# Patient Record
Sex: FEMALE | Race: WHITE | Employment: OTHER | URBAN - METROPOLITAN AREA
[De-identification: names, ages, dates, MRNs, and addresses within clinical notes are randomized per-mention and may not be internally consistent; named-entity substitution may affect disease eponyms.]

---

## 2018-03-29 ENCOUNTER — OFFICE VISIT (OUTPATIENT)
Dept: FAMILY MEDICINE CLINIC | Age: 68
End: 2018-03-29
Payer: COMMERCIAL

## 2018-03-29 VITALS
TEMPERATURE: 97.3 F | HEIGHT: 62 IN | DIASTOLIC BLOOD PRESSURE: 74 MMHG | RESPIRATION RATE: 16 BRPM | WEIGHT: 237 LBS | BODY MASS INDEX: 43.61 KG/M2 | HEART RATE: 72 BPM | OXYGEN SATURATION: 98 % | SYSTOLIC BLOOD PRESSURE: 138 MMHG

## 2018-03-29 DIAGNOSIS — M19.91 PRIMARY OSTEOARTHRITIS, UNSPECIFIED SITE: Primary | ICD-10-CM

## 2018-03-29 DIAGNOSIS — I10 ESSENTIAL HYPERTENSION: ICD-10-CM

## 2018-03-29 DIAGNOSIS — E11.9 TYPE 2 DIABETES MELLITUS WITHOUT COMPLICATION, WITHOUT LONG-TERM CURRENT USE OF INSULIN (HCC): ICD-10-CM

## 2018-03-29 PROCEDURE — 99213 OFFICE O/P EST LOW 20 MIN: CPT | Performed by: FAMILY MEDICINE

## 2018-03-29 RX ORDER — HYDROCODONE BITARTRATE AND ACETAMINOPHEN 10; 325 MG/1; MG/1
1 TABLET ORAL EVERY 6 HOURS PRN
Qty: 120 TABLET | Refills: 0 | Status: SHIPPED | OUTPATIENT
Start: 2018-03-29 | End: 2018-04-27 | Stop reason: SDUPTHER

## 2018-03-29 NOTE — PROGRESS NOTES
in the urine, no urinary frequency, no urinary incontinence, no urinary urgency, no nocturia, no dysuria    Vitals:    03/29/18 0740   BP: 138/74   Pulse: 72   Resp: 16   Temp: 97.3 °F (36.3 °C)   TempSrc: Oral   SpO2: 98%   Weight: 237 lb (107.5 kg)   Height: 5' 2\" (1.575 m)       General:  Patient alert and oriented x 3, NAD, pleasant  HEENT:  Atraumatic, normocephalic, PERRLA, EOMI, clear conjunctiva, TMs clear, nose-clear, throat - no erythema  Neck:  Supple, no goiter, no carotid bruits, no LAD  Lungs:  CTA B  Heart:  RRR, no murmurs, gallops or rubs  Abdomen:  Soft/nt/nd, + bowel sounds  Extremities:  No clubbing, cyanosis or edema  Skin: unremarkable    Assessment/Plan:      Aida Bo was seen today for arthritis. Diagnoses and all orders for this visit:    Essential hypertension  Comments:  well controlled. Primary osteoarthritis, unspecified site  -     HYDROcodone-acetaminophen (NORCO)  MG per tablet; Take 1 tablet by mouth every 6 hours as needed for Pain for up to 30 days. Earliest Fill Date: 3/29/18    Type 2 diabetes mellitus without complication, without long-term current use of insulin (Formerly [facility])  Comments:  well controlled          Educational materials and/or home exercises printed for patient's review and were included in patient instructions on his/her After Visit Summary and given to patient at the end of visit. Counseled regarding above diagnosis, including possible risks and complications,  especially if left uncontrolled. Counseled regarding the possible side effects, risks, benefits and alternatives to treatment; patient and/or guardian verbalizes understanding, agrees, feels comfortable with and wishes to proceed with above treatment plan. Advised patient to call with any new medication issues, and read all Rx info from pharmacy to assure aware of all possible risks and side effects of medication before taking.     Reviewed age and gender appropriate health screening exams and

## 2018-04-27 ENCOUNTER — OFFICE VISIT (OUTPATIENT)
Dept: FAMILY MEDICINE CLINIC | Age: 68
End: 2018-04-27
Payer: COMMERCIAL

## 2018-04-27 VITALS
RESPIRATION RATE: 14 BRPM | TEMPERATURE: 98.3 F | SYSTOLIC BLOOD PRESSURE: 118 MMHG | HEIGHT: 62 IN | BODY MASS INDEX: 43.98 KG/M2 | HEART RATE: 55 BPM | OXYGEN SATURATION: 96 % | DIASTOLIC BLOOD PRESSURE: 62 MMHG | WEIGHT: 239 LBS

## 2018-04-27 DIAGNOSIS — M19.91 PRIMARY OSTEOARTHRITIS, UNSPECIFIED SITE: ICD-10-CM

## 2018-04-27 PROCEDURE — 99213 OFFICE O/P EST LOW 20 MIN: CPT | Performed by: FAMILY MEDICINE

## 2018-04-27 RX ORDER — HYDROCODONE BITARTRATE AND ACETAMINOPHEN 10; 325 MG/1; MG/1
1 TABLET ORAL EVERY 6 HOURS PRN
Qty: 120 TABLET | Refills: 0 | Status: SHIPPED | OUTPATIENT
Start: 2018-04-27 | End: 2018-05-25 | Stop reason: SDUPTHER

## 2018-05-25 ENCOUNTER — OFFICE VISIT (OUTPATIENT)
Dept: FAMILY MEDICINE CLINIC | Age: 68
End: 2018-05-25
Payer: COMMERCIAL

## 2018-05-25 ENCOUNTER — TELEPHONE (OUTPATIENT)
Dept: FAMILY MEDICINE CLINIC | Age: 68
End: 2018-05-25

## 2018-05-25 VITALS
SYSTOLIC BLOOD PRESSURE: 117 MMHG | WEIGHT: 239 LBS | TEMPERATURE: 98.8 F | RESPIRATION RATE: 16 BRPM | OXYGEN SATURATION: 94 % | BODY MASS INDEX: 43.98 KG/M2 | HEART RATE: 98 BPM | DIASTOLIC BLOOD PRESSURE: 58 MMHG | HEIGHT: 62 IN

## 2018-05-25 DIAGNOSIS — F32.A ANXIETY AND DEPRESSION: ICD-10-CM

## 2018-05-25 DIAGNOSIS — E11.9 TYPE 2 DIABETES MELLITUS WITHOUT COMPLICATION, WITHOUT LONG-TERM CURRENT USE OF INSULIN (HCC): ICD-10-CM

## 2018-05-25 DIAGNOSIS — F41.9 ANXIETY AND DEPRESSION: ICD-10-CM

## 2018-05-25 DIAGNOSIS — I10 ESSENTIAL HYPERTENSION: Primary | ICD-10-CM

## 2018-05-25 DIAGNOSIS — M19.91 PRIMARY OSTEOARTHRITIS, UNSPECIFIED SITE: ICD-10-CM

## 2018-05-25 LAB
AMPHETAMINE SCREEN, URINE: NORMAL
BARBITURATE SCREEN, URINE: NORMAL
BENZODIAZEPINE SCREEN, URINE: NORMAL
COCAINE METABOLITE SCREEN URINE: NORMAL
MDMA URINE: NORMAL
METHADONE SCREEN, URINE: NORMAL
METHAMPHETAMINE, URINE: NORMAL
OPIATE SCREEN URINE: NORMAL
OXYCODONE SCREEN URINE: NORMAL
PHENCYCLIDINE SCREEN URINE: NORMAL
PROPOXYPHENE SCREEN, URINE: NORMAL
THC: NORMAL
TRICYCLIC ANTIDEPRESSANTS, UR: NORMAL

## 2018-05-25 PROCEDURE — 80305 DRUG TEST PRSMV DIR OPT OBS: CPT | Performed by: FAMILY MEDICINE

## 2018-05-25 PROCEDURE — 99214 OFFICE O/P EST MOD 30 MIN: CPT | Performed by: FAMILY MEDICINE

## 2018-05-25 RX ORDER — TEMAZEPAM 30 MG/1
CAPSULE ORAL
Qty: 30 CAPSULE | Refills: 3 | Status: SHIPPED | OUTPATIENT
Start: 2018-05-25 | End: 2018-08-17 | Stop reason: SDUPTHER

## 2018-05-25 RX ORDER — HYDROCODONE BITARTRATE AND ACETAMINOPHEN 10; 325 MG/1; MG/1
1 TABLET ORAL EVERY 6 HOURS PRN
Qty: 120 TABLET | Refills: 0 | Status: SHIPPED | OUTPATIENT
Start: 2018-05-25 | End: 2018-06-25 | Stop reason: SDUPTHER

## 2018-05-25 RX ORDER — OXYBUTYNIN CHLORIDE 15 MG/1
15 TABLET, EXTENDED RELEASE ORAL DAILY
Qty: 30 TABLET | Refills: 11 | Status: SHIPPED | OUTPATIENT
Start: 2018-05-25 | End: 2018-08-17 | Stop reason: SDUPTHER

## 2018-06-22 DIAGNOSIS — F41.9 ANXIETY AND DEPRESSION: ICD-10-CM

## 2018-06-22 DIAGNOSIS — F32.A ANXIETY AND DEPRESSION: ICD-10-CM

## 2018-06-22 DIAGNOSIS — E11.9 TYPE 2 DIABETES MELLITUS WITHOUT COMPLICATION, WITHOUT LONG-TERM CURRENT USE OF INSULIN (HCC): ICD-10-CM

## 2018-06-22 DIAGNOSIS — E78.1 HYPERTRIGLYCERIDEMIA: ICD-10-CM

## 2018-06-22 RX ORDER — TEMAZEPAM 30 MG/1
CAPSULE ORAL
Qty: 30 CAPSULE | Refills: 3 | OUTPATIENT
Start: 2018-06-22 | End: 2018-07-22

## 2018-06-22 RX ORDER — ATORVASTATIN CALCIUM 20 MG/1
TABLET, FILM COATED ORAL
Qty: 30 TABLET | Refills: 11 | Status: SHIPPED | OUTPATIENT
Start: 2018-06-22

## 2018-06-25 ENCOUNTER — HOSPITAL ENCOUNTER (OUTPATIENT)
Age: 68
Discharge: HOME | End: 2018-06-27
Payer: COMMERCIAL

## 2018-06-25 ENCOUNTER — OFFICE VISIT (OUTPATIENT)
Dept: FAMILY MEDICINE CLINIC | Age: 68
End: 2018-06-25
Payer: COMMERCIAL

## 2018-06-25 VITALS
RESPIRATION RATE: 16 BRPM | OXYGEN SATURATION: 97 % | HEIGHT: 62 IN | WEIGHT: 240 LBS | TEMPERATURE: 98.3 F | HEART RATE: 68 BPM | DIASTOLIC BLOOD PRESSURE: 68 MMHG | BODY MASS INDEX: 44.16 KG/M2 | SYSTOLIC BLOOD PRESSURE: 138 MMHG

## 2018-06-25 DIAGNOSIS — M19.91 PRIMARY OSTEOARTHRITIS, UNSPECIFIED SITE: ICD-10-CM

## 2018-06-25 DIAGNOSIS — F32.A ANXIETY AND DEPRESSION: ICD-10-CM

## 2018-06-25 DIAGNOSIS — F41.9 ANXIETY AND DEPRESSION: ICD-10-CM

## 2018-06-25 DIAGNOSIS — R11.0 NAUSEA: Primary | ICD-10-CM

## 2018-06-25 DIAGNOSIS — I10 ESSENTIAL HYPERTENSION: ICD-10-CM

## 2018-06-25 DIAGNOSIS — E11.9 TYPE 2 DIABETES MELLITUS WITHOUT COMPLICATION, WITHOUT LONG-TERM CURRENT USE OF INSULIN (HCC): ICD-10-CM

## 2018-06-25 PROCEDURE — 80061 LIPID PANEL: CPT

## 2018-06-25 PROCEDURE — 99214 OFFICE O/P EST MOD 30 MIN: CPT | Performed by: FAMILY MEDICINE

## 2018-06-25 PROCEDURE — 84443 ASSAY THYROID STIM HORMONE: CPT

## 2018-06-25 PROCEDURE — 80053 COMPREHEN METABOLIC PANEL: CPT

## 2018-06-25 PROCEDURE — 83036 HEMOGLOBIN GLYCOSYLATED A1C: CPT

## 2018-06-25 PROCEDURE — 85025 COMPLETE CBC W/AUTO DIFF WBC: CPT

## 2018-06-25 RX ORDER — HYDROCODONE BITARTRATE AND ACETAMINOPHEN 10; 325 MG/1; MG/1
1 TABLET ORAL EVERY 6 HOURS PRN
Qty: 120 TABLET | Refills: 0 | Status: SHIPPED | OUTPATIENT
Start: 2018-06-25 | End: 2018-07-24 | Stop reason: SDUPTHER

## 2018-06-25 RX ORDER — ESCITALOPRAM OXALATE 20 MG/1
20 TABLET ORAL DAILY
Qty: 30 TABLET | Refills: 5 | Status: SHIPPED | OUTPATIENT
Start: 2018-06-25 | End: 2018-07-19 | Stop reason: SDUPTHER

## 2018-06-25 RX ORDER — ONDANSETRON 4 MG/1
4 TABLET, FILM COATED ORAL EVERY 8 HOURS PRN
Qty: 40 TABLET | Refills: 1 | Status: SHIPPED | OUTPATIENT
Start: 2018-06-25

## 2018-06-26 LAB
ALBUMIN SERPL-MCNC: 3.3 G/DL (ref 3.5–5.2)
ALP BLD-CCNC: 185 U/L (ref 35–104)
ALT SERPL-CCNC: 16 U/L (ref 0–32)
ANION GAP SERPL CALCULATED.3IONS-SCNC: 15 MMOL/L (ref 7–16)
AST SERPL-CCNC: 34 U/L (ref 0–31)
BASOPHILS ABSOLUTE: 0.06 E9/L (ref 0–0.2)
BASOPHILS RELATIVE PERCENT: 0.8 % (ref 0–2)
BILIRUB SERPL-MCNC: 0.7 MG/DL (ref 0–1.2)
BUN BLDV-MCNC: 12 MG/DL (ref 8–23)
CALCIUM SERPL-MCNC: 9.1 MG/DL (ref 8.6–10.2)
CHLORIDE BLD-SCNC: 102 MMOL/L (ref 98–107)
CHOLESTEROL, TOTAL: 105 MG/DL (ref 0–199)
CO2: 26 MMOL/L (ref 22–29)
CREAT SERPL-MCNC: 0.6 MG/DL (ref 0.5–1)
EOSINOPHILS ABSOLUTE: 0.26 E9/L (ref 0.05–0.5)
EOSINOPHILS RELATIVE PERCENT: 3.5 % (ref 0–6)
GFR AFRICAN AMERICAN: >60
GFR NON-AFRICAN AMERICAN: >60 ML/MIN/1.73
GLUCOSE BLD-MCNC: 112 MG/DL (ref 74–109)
HBA1C MFR BLD: 5.4 % (ref 4–5.6)
HCT VFR BLD CALC: 47.9 % (ref 34–48)
HDLC SERPL-MCNC: 39 MG/DL
HEMOGLOBIN: 14.7 G/DL (ref 11.5–15.5)
IMMATURE GRANULOCYTES #: 0.05 E9/L
IMMATURE GRANULOCYTES %: 0.7 % (ref 0–5)
LDL CHOLESTEROL CALCULATED: 36 MG/DL (ref 0–99)
LYMPHOCYTES ABSOLUTE: 2.15 E9/L (ref 1.5–4)
LYMPHOCYTES RELATIVE PERCENT: 29.1 % (ref 20–42)
MCH RBC QN AUTO: 30.2 PG (ref 26–35)
MCHC RBC AUTO-ENTMCNC: 30.7 % (ref 32–34.5)
MCV RBC AUTO: 98.6 FL (ref 80–99.9)
MONOCYTES ABSOLUTE: 0.43 E9/L (ref 0.1–0.95)
MONOCYTES RELATIVE PERCENT: 5.8 % (ref 2–12)
NEUTROPHILS ABSOLUTE: 4.43 E9/L (ref 1.8–7.3)
NEUTROPHILS RELATIVE PERCENT: 60.1 % (ref 43–80)
PDW BLD-RTO: 14.7 FL (ref 11.5–15)
PLATELET # BLD: 212 E9/L (ref 130–450)
PMV BLD AUTO: 10.5 FL (ref 7–12)
POTASSIUM SERPL-SCNC: 3.9 MMOL/L (ref 3.5–5)
RBC # BLD: 4.86 E12/L (ref 3.5–5.5)
SODIUM BLD-SCNC: 143 MMOL/L (ref 132–146)
TOTAL PROTEIN: 7.8 G/DL (ref 6.4–8.3)
TRIGL SERPL-MCNC: 152 MG/DL (ref 0–149)
TSH SERPL DL<=0.05 MIU/L-ACNC: 1.15 UIU/ML (ref 0.27–4.2)
VLDLC SERPL CALC-MCNC: 30 MG/DL
WBC # BLD: 7.4 E9/L (ref 4.5–11.5)

## 2018-07-19 DIAGNOSIS — F41.9 ANXIETY AND DEPRESSION: ICD-10-CM

## 2018-07-19 DIAGNOSIS — F32.A ANXIETY AND DEPRESSION: ICD-10-CM

## 2018-07-19 RX ORDER — ESCITALOPRAM OXALATE 20 MG/1
20 TABLET ORAL DAILY
Qty: 30 TABLET | Refills: 5 | Status: SHIPPED | OUTPATIENT
Start: 2018-07-19 | End: 2018-07-24

## 2018-07-24 ENCOUNTER — OFFICE VISIT (OUTPATIENT)
Dept: FAMILY MEDICINE CLINIC | Age: 68
End: 2018-07-24
Payer: COMMERCIAL

## 2018-07-24 VITALS
HEIGHT: 62 IN | RESPIRATION RATE: 16 BRPM | DIASTOLIC BLOOD PRESSURE: 85 MMHG | WEIGHT: 230 LBS | HEART RATE: 65 BPM | BODY MASS INDEX: 42.33 KG/M2 | OXYGEN SATURATION: 96 % | TEMPERATURE: 97.8 F | SYSTOLIC BLOOD PRESSURE: 138 MMHG

## 2018-07-24 DIAGNOSIS — R42 VERTIGO: ICD-10-CM

## 2018-07-24 DIAGNOSIS — I10 ESSENTIAL HYPERTENSION: Primary | ICD-10-CM

## 2018-07-24 DIAGNOSIS — M19.91 PRIMARY OSTEOARTHRITIS, UNSPECIFIED SITE: ICD-10-CM

## 2018-07-24 PROCEDURE — 99214 OFFICE O/P EST MOD 30 MIN: CPT | Performed by: FAMILY MEDICINE

## 2018-07-24 RX ORDER — DULOXETIN HYDROCHLORIDE 20 MG/1
20 CAPSULE, DELAYED RELEASE ORAL DAILY
Qty: 30 CAPSULE | Refills: 3 | Status: SHIPPED | OUTPATIENT
Start: 2018-07-24 | End: 2018-11-02 | Stop reason: SDUPTHER

## 2018-07-24 RX ORDER — HYDROCODONE BITARTRATE AND ACETAMINOPHEN 10; 325 MG/1; MG/1
1 TABLET ORAL EVERY 6 HOURS PRN
Qty: 120 TABLET | Refills: 0 | Status: SHIPPED | OUTPATIENT
Start: 2018-07-24 | End: 2018-08-17 | Stop reason: SDUPTHER

## 2018-07-24 ASSESSMENT — PATIENT HEALTH QUESTIONNAIRE - PHQ9
2. FEELING DOWN, DEPRESSED OR HOPELESS: 1
1. LITTLE INTEREST OR PLEASURE IN DOING THINGS: 1
SUM OF ALL RESPONSES TO PHQ QUESTIONS 1-9: 2
SUM OF ALL RESPONSES TO PHQ9 QUESTIONS 1 & 2: 2

## 2018-08-06 ENCOUNTER — TELEPHONE (OUTPATIENT)
Dept: FAMILY MEDICINE CLINIC | Age: 68
End: 2018-08-06

## 2018-08-06 NOTE — TELEPHONE ENCOUNTER
I have sent a senna laxative to the pharmacy.   If she still does not have a BM in the next 48 hours or she develops severe abdominal pain and vomiting--go to ER

## 2018-08-17 ENCOUNTER — OFFICE VISIT (OUTPATIENT)
Dept: FAMILY MEDICINE CLINIC | Age: 68
End: 2018-08-17
Payer: COMMERCIAL

## 2018-08-17 VITALS
TEMPERATURE: 97.9 F | DIASTOLIC BLOOD PRESSURE: 72 MMHG | OXYGEN SATURATION: 98 % | BODY MASS INDEX: 43.06 KG/M2 | WEIGHT: 234 LBS | HEIGHT: 62 IN | HEART RATE: 75 BPM | RESPIRATION RATE: 16 BRPM | SYSTOLIC BLOOD PRESSURE: 123 MMHG

## 2018-08-17 DIAGNOSIS — F41.9 ANXIETY AND DEPRESSION: ICD-10-CM

## 2018-08-17 DIAGNOSIS — F32.A ANXIETY AND DEPRESSION: ICD-10-CM

## 2018-08-17 DIAGNOSIS — I10 ESSENTIAL HYPERTENSION: Primary | ICD-10-CM

## 2018-08-17 DIAGNOSIS — J01.41 ACUTE RECURRENT PANSINUSITIS: ICD-10-CM

## 2018-08-17 DIAGNOSIS — M19.91 PRIMARY OSTEOARTHRITIS, UNSPECIFIED SITE: ICD-10-CM

## 2018-08-17 PROCEDURE — 99214 OFFICE O/P EST MOD 30 MIN: CPT | Performed by: FAMILY MEDICINE

## 2018-08-17 RX ORDER — TEMAZEPAM 30 MG/1
CAPSULE ORAL
Qty: 30 CAPSULE | Refills: 5 | Status: SHIPPED | OUTPATIENT
Start: 2018-08-17 | End: 2019-01-21 | Stop reason: SDUPTHER

## 2018-08-17 RX ORDER — AMOXICILLIN 875 MG/1
875 TABLET, COATED ORAL 2 TIMES DAILY
Qty: 20 TABLET | Refills: 0 | Status: SHIPPED | OUTPATIENT
Start: 2018-08-17 | End: 2018-08-27

## 2018-08-17 RX ORDER — OXYBUTYNIN CHLORIDE 15 MG/1
15 TABLET, EXTENDED RELEASE ORAL DAILY
Qty: 30 TABLET | Refills: 11 | Status: SHIPPED | OUTPATIENT
Start: 2018-08-17 | End: 2018-09-27 | Stop reason: SDUPTHER

## 2018-08-17 RX ORDER — ROPINIROLE 2 MG/1
TABLET, FILM COATED ORAL
Qty: 30 TABLET | Refills: 11 | Status: SHIPPED | OUTPATIENT
Start: 2018-08-17

## 2018-08-17 RX ORDER — HYDROCODONE BITARTRATE AND ACETAMINOPHEN 10; 325 MG/1; MG/1
1 TABLET ORAL EVERY 6 HOURS PRN
Qty: 120 TABLET | Refills: 0 | Status: SHIPPED | OUTPATIENT
Start: 2018-08-17 | End: 2018-09-27 | Stop reason: SDUPTHER

## 2018-08-17 RX ORDER — MECLIZINE HYDROCHLORIDE 25 MG/1
25 TABLET ORAL 3 TIMES DAILY PRN
Qty: 30 TABLET | Refills: 0 | Status: SHIPPED | OUTPATIENT
Start: 2018-08-17 | End: 2018-08-27

## 2018-08-17 ASSESSMENT — PATIENT HEALTH QUESTIONNAIRE - PHQ9
SUM OF ALL RESPONSES TO PHQ9 QUESTIONS 1 & 2: 0
1. LITTLE INTEREST OR PLEASURE IN DOING THINGS: 0
SUM OF ALL RESPONSES TO PHQ QUESTIONS 1-9: 0
SUM OF ALL RESPONSES TO PHQ QUESTIONS 1-9: 0
2. FEELING DOWN, DEPRESSED OR HOPELESS: 0

## 2018-08-17 NOTE — PROGRESS NOTES
Hypertension:  Patient is here for follow up chronic hypertension. This is  generally controlled on current medication regimen. Takes meds as directed and tolerates them well. Most recent labs reviewed with patient and are remarkable. No symptoms from htn standpoint per ROS. Patient is  compliant with lifestyle modifications. Patient does not smoke. Comorbid conditions include severe OA and insomnia. She notes post nasal drainage x 5 days with dry cough. Also has had hearing loss and vertigo. Anxiety and depression is improved with cymbalta instead of lexapro. Controlled Substances Monitoring:     RX Monitoring 8/17/2018   Attestation The Prescription Monitoring Report for this patient was reviewed today. Documentation Possible medication side effects, risk of tolerance/dependence & alternative treatments discussed. ;Obtaining appropriate analgesic effect of treatment. ;No signs of potential drug abuse or diversion identified. Chronic Pain Treatment objectives documented - patient is progressing appropriately. ;Functional status reviewed - continues with improved or maintaining ADL's.;Reestablished informed consent. ;Reviewed the patient's functional status and documentation. Medication Contracts Existing medication contract. Patient's past medical, surgical, social and/or family history reviewed, updated in chart, and are non-contributory (unless otherwise stated). Medications and allergies also reviewed and updated in chart.         Review of Systems:  Constitutional:  No fever, no fatigue, no chills, no headaches, no weight change, +vertigo  Dermatology:  No rash, no mole, no dry or sensitive skin  ENT:  No cough, no sore throat, no sinus pain, no runny nose, no ear pain  Cardiology:  No chest pain, no palpitations, no leg edema, no shortness of breath, no PND  Gastroenterology:  No dysphagia, no abdominal pain, no nausea, no vomiting, no constipation, no diarrhea, no

## 2018-09-27 ENCOUNTER — OFFICE VISIT (OUTPATIENT)
Dept: FAMILY MEDICINE CLINIC | Age: 68
End: 2018-09-27
Payer: COMMERCIAL

## 2018-09-27 VITALS
SYSTOLIC BLOOD PRESSURE: 138 MMHG | DIASTOLIC BLOOD PRESSURE: 71 MMHG | OXYGEN SATURATION: 97 % | HEART RATE: 64 BPM | BODY MASS INDEX: 42.98 KG/M2 | WEIGHT: 235 LBS | RESPIRATION RATE: 22 BRPM | TEMPERATURE: 98.3 F

## 2018-09-27 DIAGNOSIS — M19.91 PRIMARY OSTEOARTHRITIS, UNSPECIFIED SITE: ICD-10-CM

## 2018-09-27 DIAGNOSIS — Z23 NEED FOR INFLUENZA VACCINATION: ICD-10-CM

## 2018-09-27 DIAGNOSIS — F41.9 ANXIETY AND DEPRESSION: ICD-10-CM

## 2018-09-27 DIAGNOSIS — F32.A ANXIETY AND DEPRESSION: ICD-10-CM

## 2018-09-27 DIAGNOSIS — I10 ESSENTIAL HYPERTENSION: Primary | ICD-10-CM

## 2018-09-27 PROCEDURE — 90662 IIV NO PRSV INCREASED AG IM: CPT | Performed by: FAMILY MEDICINE

## 2018-09-27 PROCEDURE — 90471 IMMUNIZATION ADMIN: CPT | Performed by: FAMILY MEDICINE

## 2018-09-27 PROCEDURE — 99214 OFFICE O/P EST MOD 30 MIN: CPT | Performed by: FAMILY MEDICINE

## 2018-09-27 RX ORDER — CLOTRIMAZOLE AND BETAMETHASONE DIPROPIONATE 10; .64 MG/G; MG/G
CREAM TOPICAL
Qty: 60 G | Refills: 5 | Status: SHIPPED | OUTPATIENT
Start: 2018-09-27

## 2018-09-27 RX ORDER — HYDROCODONE BITARTRATE AND ACETAMINOPHEN 10; 325 MG/1; MG/1
1 TABLET ORAL EVERY 6 HOURS PRN
Qty: 120 TABLET | Refills: 0 | Status: SHIPPED | OUTPATIENT
Start: 2018-09-27 | End: 2018-10-25 | Stop reason: SDUPTHER

## 2018-09-27 RX ORDER — OXYBUTYNIN CHLORIDE 15 MG/1
15 TABLET, EXTENDED RELEASE ORAL DAILY
Qty: 30 TABLET | Refills: 11 | Status: SHIPPED | OUTPATIENT
Start: 2018-09-27

## 2018-09-27 RX ORDER — AMLODIPINE BESYLATE 5 MG/1
5 TABLET ORAL DAILY
Qty: 90 TABLET | Refills: 3 | Status: SHIPPED | OUTPATIENT
Start: 2018-09-27

## 2018-10-25 ENCOUNTER — OFFICE VISIT (OUTPATIENT)
Dept: FAMILY MEDICINE CLINIC | Age: 68
End: 2018-10-25
Payer: COMMERCIAL

## 2018-10-25 VITALS
SYSTOLIC BLOOD PRESSURE: 132 MMHG | DIASTOLIC BLOOD PRESSURE: 77 MMHG | BODY MASS INDEX: 44.96 KG/M2 | WEIGHT: 244.3 LBS | HEART RATE: 52 BPM | OXYGEN SATURATION: 100 % | HEIGHT: 62 IN | TEMPERATURE: 97.5 F

## 2018-10-25 DIAGNOSIS — I10 ESSENTIAL HYPERTENSION: Primary | ICD-10-CM

## 2018-10-25 DIAGNOSIS — E11.9 TYPE 2 DIABETES MELLITUS WITHOUT COMPLICATION, WITHOUT LONG-TERM CURRENT USE OF INSULIN (HCC): ICD-10-CM

## 2018-10-25 DIAGNOSIS — Z23 NEED FOR TETANUS BOOSTER: ICD-10-CM

## 2018-10-25 DIAGNOSIS — M19.91 PRIMARY OSTEOARTHRITIS, UNSPECIFIED SITE: ICD-10-CM

## 2018-10-25 PROCEDURE — 90715 TDAP VACCINE 7 YRS/> IM: CPT | Performed by: FAMILY MEDICINE

## 2018-10-25 PROCEDURE — 90471 IMMUNIZATION ADMIN: CPT | Performed by: FAMILY MEDICINE

## 2018-10-25 PROCEDURE — 99214 OFFICE O/P EST MOD 30 MIN: CPT | Performed by: FAMILY MEDICINE

## 2018-10-25 RX ORDER — HYDROCODONE BITARTRATE AND ACETAMINOPHEN 10; 325 MG/1; MG/1
1 TABLET ORAL EVERY 6 HOURS PRN
Qty: 120 TABLET | Refills: 0 | Status: SHIPPED | OUTPATIENT
Start: 2018-10-25 | End: 2018-11-19 | Stop reason: SDUPTHER

## 2018-11-02 ENCOUNTER — TELEPHONE (OUTPATIENT)
Dept: FAMILY MEDICINE CLINIC | Age: 68
End: 2018-11-02

## 2018-11-02 DIAGNOSIS — M19.91 PRIMARY OSTEOARTHRITIS, UNSPECIFIED SITE: ICD-10-CM

## 2018-11-02 RX ORDER — DULOXETIN HYDROCHLORIDE 20 MG/1
20 CAPSULE, DELAYED RELEASE ORAL DAILY
Qty: 30 CAPSULE | Refills: 3 | Status: SHIPPED | OUTPATIENT
Start: 2018-11-02

## 2018-11-19 ENCOUNTER — OFFICE VISIT (OUTPATIENT)
Dept: FAMILY MEDICINE CLINIC | Age: 68
End: 2018-11-19
Payer: COMMERCIAL

## 2018-11-19 VITALS
TEMPERATURE: 98.4 F | HEART RATE: 61 BPM | WEIGHT: 235 LBS | RESPIRATION RATE: 18 BRPM | BODY MASS INDEX: 42.98 KG/M2 | OXYGEN SATURATION: 99 % | DIASTOLIC BLOOD PRESSURE: 78 MMHG | SYSTOLIC BLOOD PRESSURE: 147 MMHG

## 2018-11-19 DIAGNOSIS — M19.91 PRIMARY OSTEOARTHRITIS, UNSPECIFIED SITE: ICD-10-CM

## 2018-11-19 DIAGNOSIS — F41.9 ANXIETY AND DEPRESSION: Primary | ICD-10-CM

## 2018-11-19 DIAGNOSIS — F32.A ANXIETY AND DEPRESSION: Primary | ICD-10-CM

## 2018-11-19 DIAGNOSIS — I10 ESSENTIAL HYPERTENSION: ICD-10-CM

## 2018-11-19 PROCEDURE — 99214 OFFICE O/P EST MOD 30 MIN: CPT | Performed by: FAMILY MEDICINE

## 2018-11-19 RX ORDER — HYDROCODONE BITARTRATE AND ACETAMINOPHEN 10; 325 MG/1; MG/1
1 TABLET ORAL EVERY 6 HOURS PRN
Qty: 120 TABLET | Refills: 0 | Status: SHIPPED | OUTPATIENT
Start: 2018-11-19 | End: 2018-12-19

## 2018-11-19 RX ORDER — NORTRIPTYLINE HYDROCHLORIDE 25 MG/1
25 CAPSULE ORAL NIGHTLY
Qty: 30 CAPSULE | Refills: 3 | Status: SHIPPED | OUTPATIENT
Start: 2018-11-19 | End: 2018-12-21 | Stop reason: SDUPTHER

## 2018-11-19 NOTE — PROGRESS NOTES
benefits and alternatives to treatment; patient and/or guardian verbalizes understanding, agrees, feels comfortable with and wishes to proceed with above treatment plan. Advised patient to call with any new medication issues, and read all Rx info from pharmacy to assure aware of all possible risks and side effects of medication before taking. Reviewed age and gender appropriate health screening exams and vaccinations. Advised patient regarding importance of keeping up with recommended health maintenance and to schedule as soon as possible if overdue, as this is important in assessing for undiagnosed pathology, especially cancer, as well as protecting against potentially harmful/life threatening disease. Patient and/or guardian verbalizes understanding and agrees with above counseling, assessment and plan. All questions answered.

## 2018-12-21 ENCOUNTER — OFFICE VISIT (OUTPATIENT)
Dept: FAMILY MEDICINE CLINIC | Age: 68
End: 2018-12-21
Payer: COMMERCIAL

## 2018-12-21 VITALS
SYSTOLIC BLOOD PRESSURE: 100 MMHG | OXYGEN SATURATION: 96 % | WEIGHT: 238 LBS | HEIGHT: 62 IN | BODY MASS INDEX: 43.79 KG/M2 | RESPIRATION RATE: 16 BRPM | DIASTOLIC BLOOD PRESSURE: 68 MMHG | TEMPERATURE: 98.4 F | HEART RATE: 55 BPM

## 2018-12-21 DIAGNOSIS — I10 ESSENTIAL HYPERTENSION: ICD-10-CM

## 2018-12-21 DIAGNOSIS — F32.A ANXIETY AND DEPRESSION: ICD-10-CM

## 2018-12-21 DIAGNOSIS — M19.91 PRIMARY OSTEOARTHRITIS, UNSPECIFIED SITE: ICD-10-CM

## 2018-12-21 DIAGNOSIS — B96.89 ACUTE BACTERIAL SINUSITIS: Primary | ICD-10-CM

## 2018-12-21 DIAGNOSIS — J01.90 ACUTE BACTERIAL SINUSITIS: Primary | ICD-10-CM

## 2018-12-21 DIAGNOSIS — F41.9 ANXIETY AND DEPRESSION: ICD-10-CM

## 2018-12-21 PROCEDURE — 99214 OFFICE O/P EST MOD 30 MIN: CPT | Performed by: FAMILY MEDICINE

## 2018-12-21 RX ORDER — NORTRIPTYLINE HYDROCHLORIDE 50 MG/1
50 CAPSULE ORAL NIGHTLY
Qty: 30 CAPSULE | Refills: 3 | Status: SHIPPED | OUTPATIENT
Start: 2018-12-21 | End: 2019-04-30 | Stop reason: SDUPTHER

## 2018-12-21 RX ORDER — HYDROCODONE BITARTRATE AND ACETAMINOPHEN 10; 325 MG/1; MG/1
1 TABLET ORAL EVERY 6 HOURS PRN
Qty: 120 TABLET | Refills: 0 | Status: SHIPPED | OUTPATIENT
Start: 2018-12-21 | End: 2019-01-21 | Stop reason: SDUPTHER

## 2018-12-21 RX ORDER — AMOXICILLIN 875 MG/1
875 TABLET, COATED ORAL 2 TIMES DAILY
Qty: 20 TABLET | Refills: 0 | Status: SHIPPED | OUTPATIENT
Start: 2018-12-21 | End: 2018-12-31

## 2018-12-21 NOTE — PROGRESS NOTES
bruits, no LAD  Lungs:  CTA B  Heart:  RRR, no murmurs, gallops or rubs  Abdomen:  Soft/nt/nd, + bowel sounds  Extremities:  No clubbing, cyanosis or edema  Skin: unremarkable    Assessment/Plan:      Kevon Lincoln was seen today for hypertension. Diagnoses and all orders for this visit:    Acute bacterial sinusitis  -     amoxicillin (AMOXIL) 875 MG tablet; Take 1 tablet by mouth 2 times daily for 10 days    Essential hypertension    Primary osteoarthritis, unspecified site  -     HYDROcodone-acetaminophen (NORCO)  MG per tablet; Take 1 tablet by mouth every 6 hours as needed for Pain for up to 30 days. Intended supply: 30 days. Earliest Fill Date: 12/21/18    Anxiety and depression  -     nortriptyline (PAMELOR) 50 MG capsule; Take 1 capsule by mouth nightly      As above. Call or go to ED immediately if symptoms worsen or persist.  Return in about 4 weeks (around 1/18/2019). , or sooner if necessary. Educational materials and/or home exercises printed for patient's review and were included in patient instructions on his/her After Visit Summary and given to patient at the end of visit. Counseled regarding above diagnosis, including possible risks and complications,  especially if left uncontrolled. Counseled regarding the possible side effects, risks, benefits and alternatives to treatment; patient and/or guardian verbalizes understanding, agrees, feels comfortable with and wishes to proceed with above treatment plan. Advised patient to call with any new medication issues, and read all Rx info from pharmacy to assure aware of all possible risks and side effects of medication before taking. Reviewed age and gender appropriate health screening exams and vaccinations.   Advised patient regarding importance of keeping up with recommended health maintenance and to schedule as soon as possible if overdue, as this is important in assessing for undiagnosed pathology, especially cancer, as well as

## 2019-01-21 ENCOUNTER — OFFICE VISIT (OUTPATIENT)
Dept: FAMILY MEDICINE CLINIC | Age: 69
End: 2019-01-21
Payer: COMMERCIAL

## 2019-01-21 ENCOUNTER — HOSPITAL ENCOUNTER (OUTPATIENT)
Age: 69
Discharge: HOME | End: 2019-01-23
Payer: COMMERCIAL

## 2019-01-21 VITALS
DIASTOLIC BLOOD PRESSURE: 62 MMHG | RESPIRATION RATE: 16 BRPM | HEIGHT: 62 IN | HEART RATE: 59 BPM | OXYGEN SATURATION: 98 % | SYSTOLIC BLOOD PRESSURE: 128 MMHG | BODY MASS INDEX: 43.24 KG/M2 | WEIGHT: 235 LBS

## 2019-01-21 DIAGNOSIS — Z23 NEED FOR SHINGLES VACCINE: ICD-10-CM

## 2019-01-21 DIAGNOSIS — M19.91 PRIMARY OSTEOARTHRITIS, UNSPECIFIED SITE: ICD-10-CM

## 2019-01-21 DIAGNOSIS — Z76.0 MEDICINE REFILL: ICD-10-CM

## 2019-01-21 DIAGNOSIS — Z12.39 SCREENING FOR BREAST CANCER: ICD-10-CM

## 2019-01-21 DIAGNOSIS — F41.9 ANXIETY AND DEPRESSION: ICD-10-CM

## 2019-01-21 DIAGNOSIS — Z13.5 SCREENING FOR GLAUCOMA: Primary | ICD-10-CM

## 2019-01-21 DIAGNOSIS — E11.9 TYPE 2 DIABETES MELLITUS WITHOUT COMPLICATION, WITHOUT LONG-TERM CURRENT USE OF INSULIN (HCC): ICD-10-CM

## 2019-01-21 DIAGNOSIS — I10 ESSENTIAL HYPERTENSION: ICD-10-CM

## 2019-01-21 DIAGNOSIS — F32.A ANXIETY AND DEPRESSION: ICD-10-CM

## 2019-01-21 LAB
ALBUMIN SERPL-MCNC: 4 G/DL (ref 3.5–5.2)
ALP BLD-CCNC: 148 U/L (ref 35–104)
ALT SERPL-CCNC: 15 U/L (ref 0–32)
AMPHETAMINE SCREEN, URINE: NORMAL
AMPHETAMINE SCREEN, URINE: NOT DETECTED
ANION GAP SERPL CALCULATED.3IONS-SCNC: 13 MMOL/L (ref 7–16)
AST SERPL-CCNC: 37 U/L (ref 0–31)
BARBITURATE SCREEN URINE: NOT DETECTED
BARBITURATE SCREEN, URINE: NORMAL
BASOPHILS ABSOLUTE: 0.05 E9/L (ref 0–0.2)
BASOPHILS RELATIVE PERCENT: 0.8 % (ref 0–2)
BENZODIAZEPINE SCREEN, URINE: NORMAL
BENZODIAZEPINE SCREEN, URINE: POSITIVE
BILIRUB SERPL-MCNC: 1.6 MG/DL (ref 0–1.2)
BUN BLDV-MCNC: 17 MG/DL (ref 8–23)
BUPRENORPHINE URINE: NORMAL
CALCIUM SERPL-MCNC: 9.4 MG/DL (ref 8.6–10.2)
CANNABINOID SCREEN URINE: NOT DETECTED
CHLORIDE BLD-SCNC: 99 MMOL/L (ref 98–107)
CHOLESTEROL, TOTAL: 131 MG/DL (ref 0–199)
CO2: 26 MMOL/L (ref 22–29)
COCAINE METABOLITE SCREEN URINE: NORMAL
COCAINE METABOLITE SCREEN URINE: NOT DETECTED
CREAT SERPL-MCNC: 0.6 MG/DL (ref 0.5–1)
CREATININE URINE POCT: NORMAL
EOSINOPHILS ABSOLUTE: 0.17 E9/L (ref 0.05–0.5)
EOSINOPHILS RELATIVE PERCENT: 2.6 % (ref 0–6)
GABAPENTIN SCREEN, URINE: NORMAL
GFR AFRICAN AMERICAN: >60
GFR NON-AFRICAN AMERICAN: >60 ML/MIN/1.73
GLUCOSE BLD-MCNC: 116 MG/DL (ref 74–99)
HBA1C MFR BLD: 5.4 % (ref 4–5.6)
HCT VFR BLD CALC: 47.8 % (ref 34–48)
HDLC SERPL-MCNC: 51 MG/DL
HEMOGLOBIN: 15.9 G/DL (ref 11.5–15.5)
IMMATURE GRANULOCYTES #: 0.02 E9/L
IMMATURE GRANULOCYTES %: 0.3 % (ref 0–5)
LDL CHOLESTEROL CALCULATED: 51 MG/DL (ref 0–99)
LYMPHOCYTES ABSOLUTE: 2.4 E9/L (ref 1.5–4)
LYMPHOCYTES RELATIVE PERCENT: 36.6 % (ref 20–42)
MCH RBC QN AUTO: 31.6 PG (ref 26–35)
MCHC RBC AUTO-ENTMCNC: 33.3 % (ref 32–34.5)
MCV RBC AUTO: 95 FL (ref 80–99.9)
MDMA URINE: NORMAL
METHADONE SCREEN, URINE: NORMAL
METHADONE SCREEN, URINE: NOT DETECTED
METHAMPHETAMINE, URINE: NORMAL
MICROALBUMIN/CREAT 24H UR: NORMAL MG/G{CREAT}
MICROALBUMIN/CREAT UR-RTO: NORMAL
MONOCYTES ABSOLUTE: 0.42 E9/L (ref 0.1–0.95)
MONOCYTES RELATIVE PERCENT: 6.4 % (ref 2–12)
NEUTROPHILS ABSOLUTE: 3.49 E9/L (ref 1.8–7.3)
NEUTROPHILS RELATIVE PERCENT: 53.3 % (ref 43–80)
OPIATE SCREEN URINE: NORMAL
OPIATE SCREEN URINE: NOT DETECTED
OXYCODONE SCREEN URINE: NORMAL
PDW BLD-RTO: 13.2 FL (ref 11.5–15)
PHENCYCLIDINE SCREEN URINE: NORMAL
PHENCYCLIDINE SCREEN URINE: NOT DETECTED
PLATELET # BLD: 182 E9/L (ref 130–450)
PMV BLD AUTO: 9.9 FL (ref 7–12)
POTASSIUM SERPL-SCNC: 4 MMOL/L (ref 3.5–5)
PROPOXYPHENE SCREEN, URINE: NORMAL
PROPOXYPHENE SCREEN: NOT DETECTED
RBC # BLD: 5.03 E12/L (ref 3.5–5.5)
SODIUM BLD-SCNC: 138 MMOL/L (ref 132–146)
THC SCREEN, URINE: NORMAL
TOTAL PROTEIN: 7.8 G/DL (ref 6.4–8.3)
TRICYCLIC ANTIDEPRESSANTS, UR: NORMAL
TRIGL SERPL-MCNC: 145 MG/DL (ref 0–149)
TSH SERPL DL<=0.05 MIU/L-ACNC: 1.4 UIU/ML (ref 0.27–4.2)
VLDLC SERPL CALC-MCNC: 29 MG/DL
WBC # BLD: 6.6 E9/L (ref 4.5–11.5)

## 2019-01-21 PROCEDURE — 99397 PER PM REEVAL EST PAT 65+ YR: CPT | Performed by: FAMILY MEDICINE

## 2019-01-21 PROCEDURE — 80061 LIPID PANEL: CPT

## 2019-01-21 PROCEDURE — 80053 COMPREHEN METABOLIC PANEL: CPT

## 2019-01-21 PROCEDURE — G0480 DRUG TEST DEF 1-7 CLASSES: HCPCS

## 2019-01-21 PROCEDURE — 80305 DRUG TEST PRSMV DIR OPT OBS: CPT | Performed by: FAMILY MEDICINE

## 2019-01-21 PROCEDURE — 82044 UR ALBUMIN SEMIQUANTITATIVE: CPT | Performed by: FAMILY MEDICINE

## 2019-01-21 PROCEDURE — 80307 DRUG TEST PRSMV CHEM ANLYZR: CPT

## 2019-01-21 PROCEDURE — 85025 COMPLETE CBC W/AUTO DIFF WBC: CPT

## 2019-01-21 PROCEDURE — 84443 ASSAY THYROID STIM HORMONE: CPT

## 2019-01-21 PROCEDURE — 83036 HEMOGLOBIN GLYCOSYLATED A1C: CPT

## 2019-01-21 RX ORDER — HYDROCODONE BITARTRATE AND ACETAMINOPHEN 10; 325 MG/1; MG/1
1 TABLET ORAL EVERY 6 HOURS PRN
Qty: 120 TABLET | Refills: 0 | Status: SHIPPED | OUTPATIENT
Start: 2019-01-21 | End: 2019-02-20

## 2019-01-21 RX ORDER — TEMAZEPAM 30 MG/1
CAPSULE ORAL
Qty: 30 CAPSULE | Refills: 5 | Status: SHIPPED | OUTPATIENT
Start: 2019-01-21 | End: 2019-02-20

## 2019-01-21 RX ORDER — FLUTICASONE PROPIONATE 50 MCG
1 SPRAY, SUSPENSION (ML) NASAL DAILY
Qty: 1 BOTTLE | Refills: 3 | Status: SHIPPED | OUTPATIENT
Start: 2019-01-21

## 2019-01-28 LAB
7-AMINOCLONAZEPAM, URINE: <5 NG/ML
ALPHA-HYDROXYALPRAZOLAM, URINE: <5 NG/ML
ALPHA-HYDROXYMIDAZOLAM, URINE: <20 NG/ML
ALPRAZOLAM, URINE: <5 NG/ML
CHLORDIAZEPOXIDE, URINE: <20 NG/ML
CLONAZEPAM, URINE: <5 NG/ML
DIAZEPAM, URINE: 29 NG/ML
LORAZEPAM, URINE: <20 NG/ML
MIDAZOLAM, URINE: <20 NG/ML
NORDIAZEPAM, URINE: <20 NG/ML
OXAZEPAM, URINE: 3081 NG/ML
TEMAZEPAM, URINE: >4000 NG/ML

## 2019-04-30 DIAGNOSIS — F32.A ANXIETY AND DEPRESSION: ICD-10-CM

## 2019-04-30 DIAGNOSIS — F41.9 ANXIETY AND DEPRESSION: ICD-10-CM

## 2019-04-30 RX ORDER — NORTRIPTYLINE HYDROCHLORIDE 50 MG/1
50 CAPSULE ORAL NIGHTLY
Qty: 30 CAPSULE | Refills: 0 | Status: SHIPPED | OUTPATIENT
Start: 2019-04-30

## 2019-11-08 DIAGNOSIS — I10 ESSENTIAL HYPERTENSION: ICD-10-CM

## 2019-11-08 RX ORDER — AMLODIPINE BESYLATE 5 MG/1
5 TABLET ORAL DAILY
Qty: 90 TABLET | Refills: 0 | OUTPATIENT
Start: 2019-11-08

## 2023-02-21 ENCOUNTER — APPOINTMENT (OUTPATIENT)
Dept: CT IMAGING | Age: 73
End: 2023-02-21
Payer: MEDICARE

## 2023-02-21 ENCOUNTER — APPOINTMENT (OUTPATIENT)
Dept: GENERAL RADIOLOGY | Age: 73
End: 2023-02-21
Payer: MEDICARE

## 2023-02-21 ENCOUNTER — HOSPITAL ENCOUNTER (INPATIENT)
Age: 73
LOS: 4 days | Discharge: NURSING, REHAB OR LONG-TERM CARE | End: 2023-02-25
Attending: EMERGENCY MEDICINE | Admitting: SURGERY
Payer: MEDICARE

## 2023-02-21 DIAGNOSIS — I48.19 PERSISTENT ATRIAL FIBRILLATION (HCC): ICD-10-CM
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
## 2023-02-21 DIAGNOSIS — S09.90XA INJURY OF HEAD, INITIAL ENCOUNTER: Primary | ICD-10-CM

## 2023-02-21 DIAGNOSIS — M25.561 RIGHT KNEE PAIN, UNSPECIFIED CHRONICITY: ICD-10-CM
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
## 2023-02-21 LAB
ALBUMIN SERPL-MCNC: 3 G/DL (ref 3.5–5.2)
ALP BLD-CCNC: 162 U/L (ref 35–104)
ALT SERPL-CCNC: 14 U/L (ref 0–32)
ANION GAP SERPL CALCULATED.3IONS-SCNC: 11 MMOL/L (ref 7–16)
AST SERPL-CCNC: 27 U/L (ref 0–31)
BASOPHILS ABSOLUTE: 0.02 E9/L (ref 0–0.2)
BASOPHILS RELATIVE PERCENT: 0.2 % (ref 0–2)
BILIRUB SERPL-MCNC: 0.5 MG/DL (ref 0–1.2)
BUN BLDV-MCNC: 21 MG/DL (ref 6–23)
CALCIUM SERPL-MCNC: 8.7 MG/DL (ref 8.6–10.2)
CHLORIDE BLD-SCNC: 104 MMOL/L (ref 98–107)
CO2: 23 MMOL/L (ref 22–29)
CREAT SERPL-MCNC: 0.9 MG/DL (ref 0.5–1)
EOSINOPHILS ABSOLUTE: 0.1 E9/L (ref 0.05–0.5)
EOSINOPHILS RELATIVE PERCENT: 1 % (ref 0–6)
GFR SERPL CREATININE-BSD FRML MDRD: >60 ML/MIN/1.73
GLUCOSE BLD-MCNC: 105 MG/DL (ref 74–99)
HCT VFR BLD CALC: 38.1 % (ref 34–48)
HEMOGLOBIN: 12.3 G/DL (ref 11.5–15.5)
IMMATURE GRANULOCYTES #: 0.04 E9/L
IMMATURE GRANULOCYTES %: 0.4 % (ref 0–5)
LYMPHOCYTES ABSOLUTE: 1.81 E9/L (ref 1.5–4)
LYMPHOCYTES RELATIVE PERCENT: 17.9 % (ref 20–42)
MCH RBC QN AUTO: 28.7 PG (ref 26–35)
MCHC RBC AUTO-ENTMCNC: 32.3 % (ref 32–34.5)
MCV RBC AUTO: 88.8 FL (ref 80–99.9)
METER GLUCOSE: 74 MG/DL (ref 74–99)
MONOCYTES ABSOLUTE: 0.75 E9/L (ref 0.1–0.95)
MONOCYTES RELATIVE PERCENT: 7.4 % (ref 2–12)
NEUTROPHILS ABSOLUTE: 7.4 E9/L (ref 1.8–7.3)
NEUTROPHILS RELATIVE PERCENT: 73.1 % (ref 43–80)
PDW BLD-RTO: 13.2 FL (ref 11.5–15)
PLATELET # BLD: 173 E9/L (ref 130–450)
PMV BLD AUTO: 9.5 FL (ref 7–12)
POTASSIUM SERPL-SCNC: 5 MMOL/L (ref 3.5–5)
RBC # BLD: 4.29 E12/L (ref 3.5–5.5)
SODIUM BLD-SCNC: 138 MMOL/L (ref 132–146)
TOTAL PROTEIN: 6.8 G/DL (ref 6.4–8.3)
WBC # BLD: 10.1 E9/L (ref 4.5–11.5)

## 2023-02-21 PROCEDURE — 99223 1ST HOSP IP/OBS HIGH 75: CPT | Performed by: SURGERY

## 2023-02-21 PROCEDURE — 2580000003 HC RX 258

## 2023-02-21 PROCEDURE — 80053 COMPREHEN METABOLIC PANEL: CPT

## 2023-02-21 PROCEDURE — 73700 CT LOWER EXTREMITY W/O DYE: CPT

## 2023-02-21 PROCEDURE — 2140000000 HC CCU INTERMEDIATE R&B

## 2023-02-21 PROCEDURE — 93005 ELECTROCARDIOGRAM TRACING: CPT

## 2023-02-21 PROCEDURE — 99285 EMERGENCY DEPT VISIT HI MDM: CPT

## 2023-02-21 PROCEDURE — 71045 X-RAY EXAM CHEST 1 VIEW: CPT

## 2023-02-21 PROCEDURE — 73560 X-RAY EXAM OF KNEE 1 OR 2: CPT

## 2023-02-21 PROCEDURE — 73502 X-RAY EXAM HIP UNI 2-3 VIEWS: CPT

## 2023-02-21 PROCEDURE — 82962 GLUCOSE BLOOD TEST: CPT

## 2023-02-21 PROCEDURE — 96365 THER/PROPH/DIAG IV INF INIT: CPT

## 2023-02-21 PROCEDURE — 6370000000 HC RX 637 (ALT 250 FOR IP)

## 2023-02-21 PROCEDURE — 36415 COLL VENOUS BLD VENIPUNCTURE: CPT

## 2023-02-21 PROCEDURE — 70450 CT HEAD/BRAIN W/O DYE: CPT

## 2023-02-21 PROCEDURE — 6360000002 HC RX W HCPCS

## 2023-02-21 PROCEDURE — 72170 X-RAY EXAM OF PELVIS: CPT

## 2023-02-21 PROCEDURE — 85025 COMPLETE CBC W/AUTO DIFF WBC: CPT

## 2023-02-21 RX ORDER — PANTOPRAZOLE SODIUM 40 MG/1
40 TABLET, DELAYED RELEASE ORAL 2 TIMES DAILY
COMMUNITY

## 2023-02-21 RX ORDER — SENNA AND DOCUSATE SODIUM 50; 8.6 MG/1; MG/1
1 TABLET, FILM COATED ORAL 2 TIMES DAILY
Status: DISCONTINUED | OUTPATIENT
Start: 2023-02-21 | End: 2023-02-25 | Stop reason: HOSPADM

## 2023-02-21 RX ORDER — NYSTATIN 100000 [USP'U]/G
POWDER TOPICAL 2 TIMES DAILY
COMMUNITY

## 2023-02-21 RX ORDER — L. ACIDOPHILUS/L.BULGARICUS 1MM CELL
1 TABLET ORAL DAILY
COMMUNITY

## 2023-02-21 RX ORDER — ONDANSETRON 2 MG/ML
4 INJECTION INTRAMUSCULAR; INTRAVENOUS EVERY 6 HOURS PRN
Status: DISCONTINUED | OUTPATIENT
Start: 2023-02-21 | End: 2023-02-25 | Stop reason: HOSPADM

## 2023-02-21 RX ORDER — ASPIRIN 81 MG/1
81 TABLET ORAL DAILY
COMMUNITY

## 2023-02-21 RX ORDER — POTASSIUM CHLORIDE 750 MG/1
10 TABLET, EXTENDED RELEASE ORAL DAILY
COMMUNITY

## 2023-02-21 RX ORDER — CHOLECALCIFEROL (VITAMIN D3) 50 MCG
4000 TABLET ORAL DAILY
COMMUNITY

## 2023-02-21 RX ORDER — ZINC GLUCONATE 50 MG
50 TABLET ORAL DAILY
COMMUNITY

## 2023-02-21 RX ORDER — TEMAZEPAM 15 MG/1
15 CAPSULE ORAL NIGHTLY
COMMUNITY

## 2023-02-21 RX ORDER — INSULIN LISPRO 100 [IU]/ML
0-4 INJECTION, SOLUTION INTRAVENOUS; SUBCUTANEOUS NIGHTLY
Status: DISCONTINUED | OUTPATIENT
Start: 2023-02-21 | End: 2023-02-22

## 2023-02-21 RX ORDER — DEXTROSE MONOHYDRATE 100 MG/ML
INJECTION, SOLUTION INTRAVENOUS CONTINUOUS PRN
Status: DISCONTINUED | OUTPATIENT
Start: 2023-02-21 | End: 2023-02-25 | Stop reason: HOSPADM

## 2023-02-21 RX ORDER — LEVETIRACETAM 5 MG/ML
500 INJECTION INTRAVASCULAR EVERY 12 HOURS
Status: DISCONTINUED | OUTPATIENT
Start: 2023-02-21 | End: 2023-02-22

## 2023-02-21 RX ORDER — ROPINIROLE 2 MG/1
2 TABLET, FILM COATED ORAL NIGHTLY
COMMUNITY

## 2023-02-21 RX ORDER — SODIUM CHLORIDE 9 MG/ML
INJECTION, SOLUTION INTRAVENOUS CONTINUOUS
Status: DISCONTINUED | OUTPATIENT
Start: 2023-02-21 | End: 2023-02-21

## 2023-02-21 RX ORDER — RISPERIDONE 0.25 MG/1
0.25 TABLET ORAL 2 TIMES DAILY
COMMUNITY

## 2023-02-21 RX ORDER — DULOXETIN HYDROCHLORIDE 60 MG/1
60 CAPSULE, DELAYED RELEASE ORAL DAILY
COMMUNITY

## 2023-02-21 RX ORDER — OXYCODONE HYDROCHLORIDE 5 MG/1
5 TABLET ORAL EVERY 4 HOURS PRN
Status: DISCONTINUED | OUTPATIENT
Start: 2023-02-21 | End: 2023-02-25 | Stop reason: HOSPADM

## 2023-02-21 RX ORDER — HYDROCODONE BITARTRATE AND ACETAMINOPHEN 5; 325 MG/1; MG/1
1 TABLET ORAL EVERY 6 HOURS PRN
Status: ON HOLD | COMMUNITY
End: 2023-02-25 | Stop reason: HOSPADM

## 2023-02-21 RX ORDER — SODIUM CHLORIDE 0.9 % (FLUSH) 0.9 %
5-40 SYRINGE (ML) INJECTION PRN
Status: DISCONTINUED | OUTPATIENT
Start: 2023-02-21 | End: 2023-02-25 | Stop reason: HOSPADM

## 2023-02-21 RX ORDER — ONDANSETRON 4 MG/1
4 TABLET, ORALLY DISINTEGRATING ORAL EVERY 8 HOURS PRN
Status: DISCONTINUED | OUTPATIENT
Start: 2023-02-21 | End: 2023-02-25 | Stop reason: HOSPADM

## 2023-02-21 RX ORDER — LABETALOL HYDROCHLORIDE 5 MG/ML
10 INJECTION, SOLUTION INTRAVENOUS EVERY 30 MIN PRN
Status: DISCONTINUED | OUTPATIENT
Start: 2023-02-21 | End: 2023-02-25 | Stop reason: HOSPADM

## 2023-02-21 RX ORDER — POLYETHYLENE GLYCOL 3350 17 G/17G
17 POWDER, FOR SOLUTION ORAL DAILY PRN
Status: DISCONTINUED | OUTPATIENT
Start: 2023-02-21 | End: 2023-02-25 | Stop reason: HOSPADM

## 2023-02-21 RX ORDER — OXYBUTYNIN CHLORIDE 10 MG/1
20 TABLET, EXTENDED RELEASE ORAL NIGHTLY
COMMUNITY

## 2023-02-21 RX ORDER — HYDRALAZINE HYDROCHLORIDE 20 MG/ML
10 INJECTION INTRAMUSCULAR; INTRAVENOUS
Status: DISCONTINUED | OUTPATIENT
Start: 2023-02-21 | End: 2023-02-25 | Stop reason: HOSPADM

## 2023-02-21 RX ORDER — LISINOPRIL 10 MG/1
10 TABLET ORAL DAILY
COMMUNITY

## 2023-02-21 RX ORDER — OXYCODONE HYDROCHLORIDE 10 MG/1
10 TABLET ORAL EVERY 4 HOURS PRN
Status: DISCONTINUED | OUTPATIENT
Start: 2023-02-21 | End: 2023-02-25 | Stop reason: HOSPADM

## 2023-02-21 RX ORDER — CLOTRIMAZOLE AND BETAMETHASONE DIPROPIONATE 10; .64 MG/G; MG/G
CREAM TOPICAL 2 TIMES DAILY PRN
COMMUNITY

## 2023-02-21 RX ORDER — NORTRIPTYLINE HYDROCHLORIDE 25 MG/1
25 CAPSULE ORAL NIGHTLY
COMMUNITY

## 2023-02-21 RX ORDER — SODIUM CHLORIDE 0.9 % (FLUSH) 0.9 %
5-40 SYRINGE (ML) INJECTION EVERY 12 HOURS SCHEDULED
Status: DISCONTINUED | OUTPATIENT
Start: 2023-02-21 | End: 2023-02-25 | Stop reason: HOSPADM

## 2023-02-21 RX ORDER — PYRIDOXINE HCL (VITAMIN B6) 100 MG
100 TABLET ORAL 3 TIMES DAILY PRN
COMMUNITY

## 2023-02-21 RX ORDER — ACETAMINOPHEN 500 MG
1000 TABLET ORAL EVERY 8 HOURS SCHEDULED
Status: DISCONTINUED | OUTPATIENT
Start: 2023-02-21 | End: 2023-02-25 | Stop reason: HOSPADM

## 2023-02-21 RX ORDER — ONDANSETRON 4 MG/1
4 TABLET, ORALLY DISINTEGRATING ORAL EVERY 6 HOURS PRN
COMMUNITY

## 2023-02-21 RX ORDER — INSULIN LISPRO 100 [IU]/ML
0-4 INJECTION, SOLUTION INTRAVENOUS; SUBCUTANEOUS
Status: DISCONTINUED | OUTPATIENT
Start: 2023-02-21 | End: 2023-02-22

## 2023-02-21 RX ORDER — METHOCARBAMOL 500 MG/1
500 TABLET, FILM COATED ORAL 4 TIMES DAILY
Status: DISCONTINUED | OUTPATIENT
Start: 2023-02-21 | End: 2023-02-25 | Stop reason: HOSPADM

## 2023-02-21 RX ORDER — BACITRACIN ZINC 500 [USP'U]/G
OINTMENT TOPICAL 3 TIMES DAILY
Status: DISCONTINUED | OUTPATIENT
Start: 2023-02-21 | End: 2023-02-21

## 2023-02-21 RX ORDER — CALCIUM CARBONATE 200(500)MG
2 TABLET,CHEWABLE ORAL 2 TIMES DAILY PRN
COMMUNITY

## 2023-02-21 RX ORDER — FAMOTIDINE 40 MG/1
40 TABLET, FILM COATED ORAL DAILY
COMMUNITY

## 2023-02-21 RX ORDER — SODIUM CHLORIDE 9 MG/ML
INJECTION, SOLUTION INTRAVENOUS PRN
Status: DISCONTINUED | OUTPATIENT
Start: 2023-02-21 | End: 2023-02-25 | Stop reason: HOSPADM

## 2023-02-21 RX ORDER — LOPERAMIDE HYDROCHLORIDE 2 MG/1
2 CAPSULE ORAL 4 TIMES DAILY PRN
COMMUNITY

## 2023-02-21 RX ORDER — HYDROCODONE BITARTRATE AND ACETAMINOPHEN 5; 325 MG/1; MG/1
1 TABLET ORAL NIGHTLY
Status: ON HOLD | COMMUNITY
End: 2023-02-25 | Stop reason: HOSPADM

## 2023-02-21 RX ADMIN — ACETAMINOPHEN 1000 MG: 500 TABLET, FILM COATED ORAL at 23:18

## 2023-02-21 RX ADMIN — OXYCODONE HYDROCHLORIDE 10 MG: 10 TABLET ORAL at 09:11

## 2023-02-21 RX ADMIN — SENNOSIDES AND DOCUSATE SODIUM 1 TABLET: 50; 8.6 TABLET ORAL at 23:17

## 2023-02-21 RX ADMIN — METHOCARBAMOL 500 MG: 500 TABLET ORAL at 15:59

## 2023-02-21 RX ADMIN — LEVETIRACETAM 500 MG: 5 INJECTION INTRAVENOUS at 04:08

## 2023-02-21 RX ADMIN — METHOCARBAMOL 500 MG: 500 TABLET ORAL at 23:18

## 2023-02-21 RX ADMIN — SODIUM CHLORIDE: 9 INJECTION, SOLUTION INTRAVENOUS at 04:05

## 2023-02-21 RX ADMIN — OXYCODONE HYDROCHLORIDE 10 MG: 10 TABLET ORAL at 15:59

## 2023-02-21 RX ADMIN — ACETAMINOPHEN 1000 MG: 500 TABLET, FILM COATED ORAL at 06:00

## 2023-02-21 RX ADMIN — SODIUM CHLORIDE, PRESERVATIVE FREE 10 ML: 5 INJECTION INTRAVENOUS at 23:19

## 2023-02-21 RX ADMIN — METHOCARBAMOL 500 MG: 500 TABLET ORAL at 06:01

## 2023-02-21 ASSESSMENT — PAIN - FUNCTIONAL ASSESSMENT
PAIN_FUNCTIONAL_ASSESSMENT: 0-10
PAIN_FUNCTIONAL_ASSESSMENT: PREVENTS OR INTERFERES SOME ACTIVE ACTIVITIES AND ADLS

## 2023-02-21 ASSESSMENT — PAIN DESCRIPTION - FREQUENCY: FREQUENCY: CONTINUOUS

## 2023-02-21 ASSESSMENT — PAIN SCALES - GENERAL
PAINLEVEL_OUTOF10: 10
PAINLEVEL_OUTOF10: 6
PAINLEVEL_OUTOF10: 7

## 2023-02-21 ASSESSMENT — PAIN DESCRIPTION - LOCATION
LOCATION: KNEE
LOCATION: LEG
LOCATION: LEG

## 2023-02-21 ASSESSMENT — PAIN DESCRIPTION - DESCRIPTORS
DESCRIPTORS: ACHING
DESCRIPTORS: SHARP
DESCRIPTORS: ACHING

## 2023-02-21 ASSESSMENT — PAIN DESCRIPTION - ORIENTATION
ORIENTATION: LEFT
ORIENTATION: LEFT

## 2023-02-21 ASSESSMENT — PAIN DESCRIPTION - PAIN TYPE: TYPE: ACUTE PAIN

## 2023-02-21 ASSESSMENT — PAIN DESCRIPTION - ONSET: ONSET: SUDDEN

## 2023-02-21 NOTE — PROGRESS NOTES
Patient was seen by orthopedic surgery earlier this morning for left knee pain. Plain radiograph reveals status post left total knee arthroplasty with retrograde intramedullary nail fixation previously around a periprosthetic fracture. On plain radiographs there were no obvious findings of fracture thus we ordered CT imaging for further assessment to rule out occult fractures. CT reveals no fractures or dislocations about the distal femur or proximal tibia. At this time no acute orthopedic intervention required. Weightbearing as tolerated left lower extremity. We will trial physical therapy with short gait training approximately 6 to 12 feet and can progress as tolerated. DVT prophylaxis per primary service  Pain medication per primary service  Continue to ice and elevate the left lower extremity as needed to assist with pain and swelling. Patient may follow-up with Dr. Chavo Borjas in 2 weeks for repeat evaluation upon discharge from the hospital.  We will continue to monitor patient along the periphery during her hospital stay.

## 2023-02-21 NOTE — ED PROVIDER NOTES
HPI:  2/21/23, Time: 12:21 AM LARRY Gómez Ala is a 67 y.o. female presenting to the ED for history of fall head injury, beginning hours ago. The complaint has been persistent, moderate in severity, and worsened by nothing. Patient reportedly tripped and fell at home. Patient reports she was going to the bathroom and she tripped over her slippers. Patient was seen at outlying facility reportedly had signs of hemorrhage on CT. Patient was sent here for evaluation. Patient reporting no chest pain no difficulty breathing or abdominal pain she does complain of some pain to her left leg. Patient reporting no back pain. Patient reporting no fever chills or cough. ROS:   Pertinent positives and negatives are stated within HPI, all other systems reviewed and are negative.  --------------------------------------------- PAST HISTORY ---------------------------------------------  Past Medical History:  has a past medical history of Anemia, Anxiety, Depression, Hypertension, and Osteoarthritis. Past Surgical History:  has a past surgical history that includes Gastric bypass surgery (05/01/2000); hernia repair; Cholecystectomy; and joint replacement (Bilateral, 10/2012). Social History:  reports that she is a non-smoker but has been exposed to tobacco smoke. She has never used smokeless tobacco. She reports that she does not drink alcohol and does not use drugs. Family History: family history includes Dementia in her mother; Diabetes in her mother, sister, and son. The patients home medications have been reviewed.     Allergies: Ambien [zolpidem tartrate]    ---------------------------------------------------PHYSICAL EXAM--------------------------------------    Constitutional/General: Alert and oriented x3,   Head: Normocephalic and atraumatic  Eyes: PERRL, EOMI  Mouth: Oropharynx clear, handling secretions, no trismus  Neck: Supple, full ROM, non tender to palpation in the midline, no stridor, no crepitus, no meningeal signs  Pulmonary: Lungs clear to auscultation bilaterally, no wheezes, rales, or rhonchi. Not in respiratory distress  Cardiovascular:  Regular rate. Regular rhythm. No murmurs, gallops, or rubs. 2+ distal pulses  Chest: no chest wall tenderness  Abdomen: Soft. Non tender. Non distended. +BS. No rebound, guarding, or rigidity. No pulsatile masses appreciated. Musculoskeletal: Moves all extremities x 4, tenderness to left knee range of motion limited secondary to pain noted swelling pulses are intact distally patient able to move her feet plantarflex and her foot was to flex bilaterally sensation is intact pulses are intact distally. Warm and well perfused, no clubbing, cyanosis, or edema. Capillary refill <3 seconds  Skin: warm and dry. No rashes. Neurologic: GCS 15, CN 2-12 grossly intact, no focal deficits, symmetric strength 5/5 in the upper   Psych: Normal Affect    -------------------------------------------------- RESULTS -------------------------------------------------  I have personally reviewed all laboratory and imaging results for this patient. Results are listed below.      LABS:  Results for orders placed or performed during the hospital encounter of 02/21/23   CBC with Auto Differential   Result Value Ref Range    WBC 10.1 4.5 - 11.5 E9/L    RBC 4.29 3.50 - 5.50 E12/L    Hemoglobin 12.3 11.5 - 15.5 g/dL    Hematocrit 38.1 34.0 - 48.0 %    MCV 88.8 80.0 - 99.9 fL    MCH 28.7 26.0 - 35.0 pg    MCHC 32.3 32.0 - 34.5 %    RDW 13.2 11.5 - 15.0 fL    Platelets 375 484 - 638 E9/L    MPV 9.5 7.0 - 12.0 fL    Neutrophils % 73.1 43.0 - 80.0 %    Immature Granulocytes % 0.4 0.0 - 5.0 %    Lymphocytes % 17.9 (L) 20.0 - 42.0 %    Monocytes % 7.4 2.0 - 12.0 %    Eosinophils % 1.0 0.0 - 6.0 %    Basophils % 0.2 0.0 - 2.0 %    Neutrophils Absolute 7.40 (H) 1.80 - 7.30 E9/L    Immature Granulocytes # 0.04 E9/L    Lymphocytes Absolute 1.81 1.50 - 4.00 E9/L    Monocytes Absolute 0.75 0.10 - 0.95 E9/L    Eosinophils Absolute 0.10 0.05 - 0.50 E9/L    Basophils Absolute 0.02 0.00 - 0.20 E9/L   Comprehensive Metabolic Panel   Result Value Ref Range    Sodium 138 132 - 146 mmol/L    Potassium 5.0 3.5 - 5.0 mmol/L    Chloride 104 98 - 107 mmol/L    CO2 23 22 - 29 mmol/L    Anion Gap 11 7 - 16 mmol/L    Glucose 105 (H) 74 - 99 mg/dL    BUN 21 6 - 23 mg/dL    Creatinine 0.9 0.5 - 1.0 mg/dL    Est, Glom Filt Rate >60 >=60 mL/min/1.73    Calcium 8.7 8.6 - 10.2 mg/dL    Total Protein 6.8 6.4 - 8.3 g/dL    Albumin 3.0 (L) 3.5 - 5.2 g/dL    Total Bilirubin 0.5 0.0 - 1.2 mg/dL    Alkaline Phosphatase 162 (H) 35 - 104 U/L    ALT 14 0 - 32 U/L    AST 27 0 - 31 U/L       RADIOLOGY:  Interpreted by Radiologist.  XR KNEE RIGHT (1-2 VIEWS)   Final Result   No acute disease. RECOMMENDATION:   Careful clinical correlation and follow up recommended. XR HIP 2-3 VW W PELVIS LEFT   Final Result   No visible fracture. RECOMMENDATION:   Careful clinical correlation and follow up recommended. CT HEAD WO CONTRAST   Final Result   18 mm hyperdensity within the right parietal deep white matter suggesting   hemorrhage versus mass. Prior imaging not available on this database for   direct comparison. No significant mass effect or surrounding edema. RECOMMENDATIONS:   Careful clinical correlation and follow up recommended. MRI evaluation   recommended. XR CHEST PORTABLE   Final Result   No acute disease. RECOMMENDATION:   Careful clinical correlation and follow up recommended. XR PELVIS (1-2 VIEWS)   Final Result   Abnormal appearance bilateral femoral necks concerning for fracture at either   site. Question minimally displaced fractures right superior and inferior   pubic rami. Significant overlying soft tissue obscures cortical detail. Consider CT evaluation. RECOMMENDATION:   Careful clinical correlation and follow up recommended.          XR KNEE LEFT (1-2 VIEWS)   Final Result   No acute disease. RECOMMENDATION:   Careful clinical correlation and follow up recommended. CT FEMUR LEFT WO CONTRAST    (Results Pending)         ------------------------- NURSING NOTES AND VITALS REVIEWED ---------------------------   The nursing notes within the ED encounter and vital signs as below have been reviewed by myself. BP (!) 113/51   Pulse 63   Temp 98.3 °F (36.8 °C)   Resp 16   Ht 5' 2\" (1.575 m)   Wt 234 lb (106.1 kg)   SpO2 98%   BMI 42.80 kg/m²   Oxygen Saturation Interpretation: Normal    The patients available past medical records and past encounters were reviewed.         ------------------------------ ED COURSE/MEDICAL DECISION MAKING----------------------  Medications   hydrALAZINE (APRESOLINE) injection 10 mg (has no administration in time range)   labetalol (NORMODYNE;TRANDATE) injection 10 mg (has no administration in time range)   0.9 % sodium chloride infusion ( IntraVENous New Bag 2/21/23 0405)   levETIRAcetam (KEPPRA) 500 mg/100 mL IVPB (0 mg IntraVENous Stopped 2/21/23 0531)   sodium chloride flush 0.9 % injection 5-40 mL (has no administration in time range)   sodium chloride flush 0.9 % injection 5-40 mL (has no administration in time range)   0.9 % sodium chloride infusion (has no administration in time range)   bacitracin zinc ointment (has no administration in time range)   ondansetron (ZOFRAN-ODT) disintegrating tablet 4 mg (has no administration in time range)     Or   ondansetron (ZOFRAN) injection 4 mg (has no administration in time range)   sennosides-docusate sodium (SENOKOT-S) 8.6-50 MG tablet 1 tablet (has no administration in time range)   polyethylene glycol (GLYCOLAX) packet 17 g (has no administration in time range)   acetaminophen (TYLENOL) tablet 1,000 mg (1,000 mg Oral Given 2/21/23 0600)   oxyCODONE (ROXICODONE) immediate release tablet 5 mg (has no administration in time range)     Or   oxyCODONE HCl (OXY-IR) immediate release tablet 10 mg (has no administration in time range)   methocarbamol (ROBAXIN) tablet 500 mg (500 mg Oral Given 2/21/23 0601)             Medical Decision Making:      History From: Patient with history of anemia depression hypertension. Patient reporting fall she tripped and fell at home. She was going to the bathroom. Patient struck her head on the doorknob and she had a CT done at outlying facility that was concerning for intracranial hemorrhage. Patient was sent here for evaluation she reports pain mainly on the left frontal aspect of her scalp she reports no neck or back pain she reports no chest pain or difficulty breathing she does complain of left knee pain. Patient reporting no vomiting or diarrhea    CC/HPI Summary, DDx, ED Course, Reassessment, Tests Considered, Patient expectation:   Patient with history of anemia depression hypertension presenting here because of slip and fall. Patient struck her head on a doorknob. Patient had CT which was concerning for bleed on CT. Patient was sent here for evaluation she reports no numbness or tingling she reports no chest pain or difficulty breathing she reports no neck or back pain. Patient reporting left knee pain as well as hip pain she had imaging done of her head and neck as well as her pelvis. As well as left femur which were within normal notes with exception of the CT of the head. Patient is awake alert orient x3 heart lung exam normal abdomen soft nontender. Patient able to move all extremities limited to her left lower extremity secondary to pain she has some swelling to her left knee. Patient pulses are intact distally she is able to plantarflex and dorsiflex bilaterally patient differential includes intracranial hemorrhage as well as chronic subdural as well as knee fracture    Patient labs noted CBC electrolytes within normal and CT head  Shows noted density right parietal.  Hemorrhage versus mass they are unable to compare to prior CTs.   Patient x-ray of her knee shows no acute fracture. Patient hip on the left side showed no fracture. Chest x-ray was within normal limits. Vital signs stable. Trauma service was consulted and they will evaluate and admit patient. Patient's vital signs remained stable in the emergency department    Social Determinants affecting Dx or Tx: Patient does not smoke or drink    Chronic Conditions: History of anemia hypertension depression    Records Reviewed: Records reviewed from outlying facility as well as CT head and neck and x-rays        Re-Evaluations:             Re-evaluation. Patients symptoms show no change      Consultations:             trauma    Critical Care: This patient's ED course included: a personal history and physicial eaxmination    This patient has been closely monitored during their ED course. Counseling: The emergency provider has spoken with the patient and discussed todays results, in addition to providing specific details for the plan of care and counseling regarding the diagnosis and prognosis. Questions are answered at this time and they are agreeable with the plan.       --------------------------------- IMPRESSION AND DISPOSITION ---------------------------------    IMPRESSION  1. Injury of head, initial encounter    2. Fall from standing, initial encounter    3. Right knee pain, unspecified chronicity        DISPOSITION  Disposition: Admit to traumaa  Patient condition is fair        NOTE: This report was transcribed using voice recognition software.  Every effort was made to ensure accuracy; however, inadvertent computerized transcription errors may be present          Daysi Rolle MD  02/21/23 10 [facility], [place]  02/21/23 [id number]

## 2023-02-21 NOTE — H&P
TRAUMA HISTORY & PHYSICAL  Surgical Resident/Advance Practice Nurse  2/21/2023  2:30 AM    PRIMARY SURVEY    CHIEF COMPLAINT:  Trauma consult. Mechanical fall, injury occurring approximately 7 PM 2/20. States she tripped over her slippers, falling forwards but landing on her side. Patient lives in a skilled nursing home. She states she hit the back of her head. There is no laceration or obvious abrasion. Denies LOC. Denies use of anticoagulation. EMR states aspirin is a daily med, she denies using aspirin. She denies any neck pain, back pain. She has chronic left and right knee pain. States that her left knee hurts more after the fall than it usually does. Patient was evaluated at [facility] found to have possible right parietal intracranial hemorrhage. He was transferred to a trauma level 1 center for higher level care and neurosurgery evaluation. Patient additionally states she has a known intracranial AVM. Patient is GCS 15 without focal deficit. AIRWAY:   Airway Normal  EMS ETT Absent  Noisy respirations Absent  Retractions: Absent  Vomiting/bleeding: Absent      BREATHING:    Midaxillary breath sound left:  Normal  Midaxillary breath sound right:  Normal    Cough sound intensity:  good   FiO2: Room air  SMI 1500 mL.       CIRCULATION:   Femerol pulse intensity: Strong  Palpebral conjunctiva: Pink     Vitals:    02/21/23 0039   BP: 123/63   Pulse: 65   Resp: 20   Temp: 97.7 °F (36.5 °C)   SpO2: 98%          FAST EXAM:  Not performed    Central Nervous System    GCS Initial 15 minutes   Eye  Motor  Verbal 4 - Opens eyes on own  6 - Follows simple motor commands  5 - Alert and oriented 4 - Opens eyes on own  6 - Follows simple motor commands  5 - Alert and oriented     Neuromuscular blockade: No  Pupil size:  Left 3 mm    Right 3 mm  Pupil reaction: Yes    Wiggles fingers: Left Yes Right Yes  Wiggles toes: Left Yes   Right Yes    Hand grasp:   Left  Present      Right  Present  Plantar flexion: Left  Present      Right   Present    Loss of consciousness:  No  History Obtained From:  Patient & EMS  Private Medical Doctor: No primary care provider on file. Pre-exisiting Medical History:  yes  As below    Conditions:   Past Medical History:   Diagnosis Date    Anemia 2000    Anxiety     Depression     Hypertension     Osteoarthritis          Medications:   Prior to Admission medications    Medication Sig Start Date End Date Taking? Authorizing Provider   nortriptyline (PAMELOR) 50 MG capsule Take 1 capsule by mouth nightly 4/30/19   Karina Mackay MD   fluticasone [facility]) 50 MCG/ACT nasal spray 1 spray by Each Nare route daily 1/21/19   Karina Mackay MD   amLODIPine (NORVASC) 5 MG tablet TAKE 1 TABLET BY MOUTH DAILY 11/5/18   Paloma Guevara MD   DULoxetine (CYMBALTA) 20 MG extended release capsule Take 1 capsule by mouth daily 11/2/18   Karina Mackay MD   clotrimazole-betamethasone (LOTRISONE) 1-0.05 % cream Apply topically 2 times daily. 9/27/18   Karina Mackay MD   amLODIPine (NORVASC) 5 MG tablet Take 1 tablet by mouth daily 9/27/18   Karina Mackay MD   oxybutynin (DITROPAN XL) 15 MG extended release tablet Take 1 tablet by mouth daily 9/27/18   Karina Mackay MD   rOPINIRole (REQUIP) 2 MG tablet TAKE 1 TABLET BY MOUTH EVERY NIGHT 8/17/18   Karina Mackay MD   ondansetron (ZOFRAN) 4 MG tablet Take 1 tablet by mouth every 8 hours as needed for Nausea or Vomiting 6/25/18   Karina Mackay MD   atorvastatin (LIPITOR) 20 MG tablet TAKE 1 TABLET BY MOUTH DAILY 6/22/18   Karina Mackay MD   Handicap Radha MISC by Does not apply route Cannot walk 200 ft.  Without stopping to rest  Duration: Lifetime  Exp:  6/1/22 6/1/17   Karina Mackay MD   Multiple Vitamins-Minerals (WOMENS 50+ MULTI VITAMIN/MIN PO) Take by mouth    Historical Provider, MD   Omega-3 Fatty Acids (FISH OIL) 1000 MG CAPS Take 3,000 mg by mouth 3 times daily    Historical Provider, MD   potassium chloride SA (K-DUR;KLOR-CON M) 10 MEQ tablet Take 1 tablet by mouth daily. 1/12/15 2/8/15  Amparo Hamilton MD   ferrous sulfate 325 (65 FE) MG tablet Take 325 mg by mouth daily (with breakfast). Historical Provider, MD         Allergies: Allergies   Allergen Reactions    Ambien [Zolpidem Tartrate]      Sleep walking         Social History:   Patient does not drink alcohol she does not smoke cigarettes      Past Surgical History:    As below  Past Surgical History:   Procedure Laterality Date    CHOLECYSTECTOMY      GASTRIC BYPASS SURGERY  05/01/2000    HERNIA REPAIR      JOINT REPLACEMENT Bilateral 10/2012         Anticoagulant use: No  Antiplatelet use: No  NSAID use in last 72 hours: no  Taken PCN in past:  unknown  Last food/drink: 2/20  Last tetanus: 2018    Family History:   Not pertinent to presenting problem. No prior adverse reactions to anesthesia    Complaints:   Head:  None  Neck:   None  Chest:   None  Back:   None  Abdomen:   None  Extremities:   Mild left lower extremity knee/hip pain      Review of systems:  All negative unless otherwise noted. SECONDARY SURVEY  Head/scalp: Atraumatic    Face: Atraumatic    Eyes/ears/nose: Atraumatic    Pharynx/mouth: Atraumatic    Neck: Atraumatic     Cervical spine tenderness:   Cervical collar not indicated  Pain:  none  ROM: Full range of motion without pain    Chest wall:  Atraumatic    Heart:  Regular rate & rhythm    Abdomen: Atraumatic. Soft ND  Tenderness:  none    Pelvis: Atraumatic  Tenderness: none    Thoracolumbar spine: Atraumatic  Tenderness:  none    Genitourinary:  Atraumatic. No blood or urine noted    Rectum: Atraumatic. No blood noted. Perineum: Atraumatic. No blood or urine noted.       Extremities:   Sensory normal  Motor normal    Distal Pulses  Left arm normal  Right arm normal  Left leg normal  Right leg normal    Capillary refill  Left arm normal  Right arm normal  Left leg normal  Right leg normal    Procedures in ED: Peripheral IVs    In the event of Emergency Blood Transfusion:  Due to the critical condition of this patient, I request the immediate release of blood products for emergency transfusion secondary to shock. I understand the increased risks incurred by the lack of complete transfusion testing. Radiology: Radiology reviewed: CT head, CT C-spine  Will need our repeat CT head. She will need chest x-ray pelvic x-ray.   ED ordered left knee x-ray    Consultations: Neurosurgery    Admission/Diagnosis: Fall    Plan of Treatment:  Follow up labs  Follow up imaging  [facility] exam  Likely intermediate floor pending repeat CT head  If true intracranial bleed will need Keppra  Started as needed labetalol and hydralazine, maintenance fluids    Plan to be discussed with Dr. Baljit White     Electronically signed by Marley English DO on 2/21/2023 at 2:30 AM

## 2023-02-21 NOTE — H&P
TRAUMA SURGERY HISTORY & PHYSICAL  TRAUMA ATTENDING      Attending Physician Statement:  I have examined the patient in ED, reviewed the record, and discussed the case with the resident/APN. I agree with the  assessment and plan with the following corrections/additions. CC: s/p mechanical fall    HISTORY   The patient is a 68 y/o female who sustained a mechanical fall yesterday. She did hit her head. She ambulates with a wheeled walker. She is morbidly obese. The patient reported  acute, constant  sharp pain localized to the head that started immediately. The intensity of the pain is 3/10. Pain does not radiate. There are no alleviating or worsening factors regarding the pain. The patient was transported by EMS to the [place] [id number] Level 1 [facility] from [facility].   Evaluation prior to arrival included: CT head. Treatment prior to arrival included: none. A trauma consult was requested to assist, guide,  and expedite further evaluation and treatment for the patient. Past medical/surgical/family/social history: as pulled from the chart.   Past Medical History:   Diagnosis Date    Anemia 2000    Anxiety     Depression     Hypertension     Osteoarthritis      Past Surgical History:   Procedure Laterality Date    CHOLECYSTECTOMY      GASTRIC BYPASS SURGERY  05/01/2000    HERNIA REPAIR      JOINT REPLACEMENT Bilateral 10/2012     Family History   Problem Relation Age of Onset    Diabetes Mother     Dementia Mother     Diabetes Sister     Diabetes Son      Social History     Tobacco Use    Smoking status: Passive Smoke Exposure - Never Smoker    Smokeless tobacco: Never   Vaping Use    Vaping Use: Never used   Substance Use Topics    Alcohol use: No    Drug use: No     Allergies   Allergen Reactions    Ambien [Zolpidem Tartrate]      Sleep walking     Current Facility-Administered Medications   Medication Dose Route Frequency Provider Last Rate Last Admin    hydrALAZINE (APRESOLINE) injection 10 mg  10 mg IntraVENous Q1H PRN Samson Gil, DO        labetalol (NORMODYNE;TRANDATE) injection 10 mg  10 mg IntraVENous Q30 Min PRN Dixon Castanon, DO        0.9 % sodium chloride infusion   IntraVENous Continuous Samson Gil, DO 75 mL/hr at 02/21/23 0405 New Bag at 02/21/23 0405    levETIRAcetam (KEPPRA) 500 mg/100 mL IVPB  500 mg IntraVENous Q12H Samson Gil, DO   Stopped at 02/21/23 0531    sodium chloride flush 0.9 % injection 5-40 mL  5-40 mL IntraVENous 2 times per day Samson Gil, DO        sodium chloride flush 0.9 % injection 5-40 mL  5-40 mL IntraVENous PRN Dixon Castanon, DO        0.9 % sodium chloride infusion   IntraVENous PRN Dixon Castanon, DO        ondansetron (ZOFRAN-ODT) disintegrating tablet 4 mg  4 mg Oral Q8H PRN Samson Gil DO        Or    ondansetron (ZOFRAN) injection 4 mg  4 mg IntraVENous Q6H PRN Dixon Castanon, DO        sennosides-docusate sodium (SENOKOT-S) 8.6-50 MG tablet 1 tablet  1 tablet Oral BID Dixon Castanon, DO        polyethylene glycol (GLYCOLAX) packet 17 g  17 g Oral Daily PRN Samson Gil, DO        acetaminophen (TYLENOL) tablet 1,000 mg  1,000 mg Oral 3 times per day Samson Gil DO   1,000 mg at 02/21/23 0600    oxyCODONE (ROXICODONE) immediate release tablet 5 mg  5 mg Oral Q4H PRN Samson Gil, DO        Or    oxyCODONE HCl (OXY-IR) immediate release tablet 10 mg  10 mg Oral Q4H PRN Samson Gil, DO        methocarbamol (ROBAXIN) tablet 500 mg  500 mg Oral 4x Daily Dixon Castanon, DO   500 mg at 02/21/23 0601    glucose chewable tablet 16 g  4 tablet Oral PRN Dixon Castanon, DO        dextrose bolus 10% 125 mL  125 mL IntraVENous PRN Samson Gil, DO        Or    dextrose bolus 10% 250 mL  250 mL IntraVENous PRN Dixon Castanon, DO        glucagon injection 1 mg  1 mg SubCUTAneous PRN Dixon Straffin, DO        dextrose 10 % infusion   IntraVENous Continuous PRN Dixon Castanon, DO        insulin lispro (HUMALOG) injection vial 0-4 Units  0-4 Units SubCUTAneous TID PHUC Castanon DO        insulin lispro (HUMALOG) injection vial 0-4 Units  0-4 Units SubCUTAneous Nightly Kenton Jimenez DO         Current Outpatient Medications   Medication Sig Dispense Refill    nortriptyline (PAMELOR) 50 MG capsule Take 1 capsule by mouth nightly 30 capsule 0    fluticasone (FLONASE) 50 MCG/ACT nasal spray 1 spray by Each Nare route daily 1 Bottle 3    amLODIPine (NORVASC) 5 MG tablet TAKE 1 TABLET BY MOUTH DAILY 90 tablet 0    DULoxetine (CYMBALTA) 20 MG extended release capsule Take 1 capsule by mouth daily 30 capsule 3    clotrimazole-betamethasone (LOTRISONE) 1-0.05 % cream Apply topically 2 times daily. 60 g 5    amLODIPine (NORVASC) 5 MG tablet Take 1 tablet by mouth daily 90 tablet 3    oxybutynin (DITROPAN XL) 15 MG extended release tablet Take 1 tablet by mouth daily 30 tablet 11    rOPINIRole (REQUIP) 2 MG tablet TAKE 1 TABLET BY MOUTH EVERY NIGHT 30 tablet 11    ondansetron (ZOFRAN) 4 MG tablet Take 1 tablet by mouth every 8 hours as needed for Nausea or Vomiting 40 tablet 1    atorvastatin (LIPITOR) 20 MG tablet TAKE 1 TABLET BY MOUTH DAILY 30 tablet 11    Handicap Placard MISC by Does not apply route Cannot walk 200 ft. Without stopping to rest  Duration: Lifetime  Exp:  6/1/22 1 each 0    Multiple Vitamins-Minerals (WOMENS 50+ MULTI VITAMIN/MIN PO) Take by mouth      Omega-3 Fatty Acids (FISH OIL) 1000 MG CAPS Take 3,000 mg by mouth 3 times daily      ferrous sulfate 325 (65 FE) MG tablet Take 325 mg by mouth daily (with breakfast).          Additional history as asked from the patient:  PMH: anxiety, depression, HTN, gait instability  PSH:  RYGB, cholecystectomy  FAMHX: no issues with general anesthesia  SocHx: no tob, no drugs, no alcohol  Allergy: ambien  Medications: pamelor, flonase, norvasc, cymbalta, ditropan, requip, lipitor, iron    Review of Systems:  General ROS: negative  Psychological ROS: negative  ENT ROS: negative  Hematological and Lymphatic ROS: negative  Respiratory ROS: negative  Cardiovascular ROS: negative  Gastrointestinal ROS: negative  Genito-Urinary ROS: negative  Musculoskeletal ROS: gait instability  Neurological ROS: headache     PHYSICAL EXAM:  Vitals:    02/21/23 0607   BP: (!) 113/51   Pulse: 63   Resp: 16   Temp: 98.3 °F (36.8 °C)   SpO2: 98%       Neuro:   GCS 15    Moving all extremities   Reactive, equal pupils  Psychiatric:  Affect normal, Judgement normal   Alert and oriented to self, place, time  Head/Face:   no soft tissue injury  no bony deformities  Eyes:    Vision/EOM grossly intact  Ears:    no otorrhagia  Nose:     no epistaxis  Mouth:    no intraoral lacerations/ecchymoses  Neck:   no tracheal deviation   no soft tissue injury  Chest:    no deformities  non tender  Lungs:     equal and clear bilateral breath sounds  no use of accessory muscles  Heart:    normal sinus rhythm  warm, well perfused  strong femoral pulses bilaterally  Abdomen:    non distended  non tender  no soft tissue injury  Back:    no soft tissue injury  no deformities  nontender   Musculoskeletal:    Gait not inspected due to patient on trauma bed  RUE:  no soft tissue injury, swelling, or deformity  RLE:  no soft tissue injury, swelling, or deformity  LUE:  no soft tissue injury, swelling, or deformity  LLE:   soft tissue injury, swelling, or deformity--LLE shortened      DIAGNOSTIC/ LAB FINDINGS  I  Independently reviewed the available laboratory studies and diagnostic imaging.     ASSESSMENT:   IPH  HTN  Gait instability  Left thigh contusion    PLAN:  NSGY c/s  Admit to monitored floor  Ortho c/s  Monitor SBP and keep < 140      Sue Palma MD, MSc, [facility]  [facility]  2/21/2023  8:34 AM

## 2023-02-21 NOTE — PROGRESS NOTES
Trauma Tertiary Survey    Admit Date: 2/21/2023  Hospital day 0    CC:  Fall    Alcohol pre-screening:  Women: How many times in the past year have you had 4 or more drinks in a day? none  How much do you drink on a daily basis? 0    Drug Pre-screening:    How many times in the past year have you used a recreational drug or used a prescription medication for non medical reasons? No    Mood Prescreening:    During the past two weeks, have you been bothered by little interest or pleasure doing things? No  During the past two weeks, have you been bothered by feeling down, depressed or hopeless? No      Scheduled Meds:   levETIRAcetam  500 mg IntraVENous Q12H    sodium chloride flush  5-40 mL IntraVENous 2 times per day    bacitracin zinc   Topical TID    sennosides-docusate sodium  1 tablet Oral BID    acetaminophen  1,000 mg Oral 3 times per day    methocarbamol  500 mg Oral 4x Daily     Continuous Infusions:   sodium chloride 75 mL/hr at 02/21/23 0405    sodium chloride       PRN Meds:hydrALAZINE, labetalol, sodium chloride flush, sodium chloride, ondansetron **OR** ondansetron, polyethylene glycol, oxyCODONE **OR** oxyCODONE    Subjective:     Patient overall feeling well. Only complaint is that she is hungry and wishes to eat. She still is having left lower extremity pain, thankfully her pain is much better controlled this morning. Objective:   Patient Vitals for the past 8 hrs:   BP Temp Temp src Pulse Resp SpO2 Height Weight   02/21/23 0607 (!) 113/51 98.3 °F (36.8 °C) -- 63 16 98 % -- --   02/21/23 0409 (!) 156/65 -- -- 64 18 98 % -- --   02/21/23 0230 (!) 142/68 -- -- 64 21 100 % -- --   02/21/23 0039 123/63 97.7 °F (36.5 °C) Oral 65 20 98 % 5' 2\" (1.575 m) 234 lb (106.1 kg)       I/O last 3 completed shifts: In: 500 [IV Piggyback:500]  Out: -   No intake/output data recorded.     Past Medical History:   Diagnosis Date    Anemia 2000    Anxiety     Depression     Hypertension     Osteoarthritis Medication reconciliation needs done    Radiology:  XR KNEE RIGHT (1-2 VIEWS)   Final Result   No acute disease. RECOMMENDATION:   Careful clinical correlation and follow up recommended. XR HIP 2-3 VW W PELVIS LEFT   Final Result   No visible fracture. RECOMMENDATION:   Careful clinical correlation and follow up recommended. CT HEAD WO CONTRAST   Final Result   18 mm hyperdensity within the right parietal deep white matter suggesting   hemorrhage versus mass. Prior imaging not available on this database for   direct comparison. No significant mass effect or surrounding edema. RECOMMENDATIONS:   Careful clinical correlation and follow up recommended. MRI evaluation   recommended. XR CHEST PORTABLE   Final Result   No acute disease. RECOMMENDATION:   Careful clinical correlation and follow up recommended. XR PELVIS (1-2 VIEWS)   Final Result   Abnormal appearance bilateral femoral necks concerning for fracture at either   site. Question minimally displaced fractures right superior and inferior   pubic rami. Significant overlying soft tissue obscures cortical detail. Consider CT evaluation. RECOMMENDATION:   Careful clinical correlation and follow up recommended. XR KNEE LEFT (1-2 VIEWS)   Final Result   No acute disease. RECOMMENDATION:   Careful clinical correlation and follow up recommended.          CT FEMUR LEFT WO CONTRAST    (Results Pending)       PHYSICAL EXAM:     Central Nervous System  Loss of consciousness:  No    GCS:    Eye:  4 - Opens eyes on own  Motor:  6 - Follows simple motor commands  Verbal:  5 - Alert and oriented    Neuromuscular blockade: No  Pupil size:  Left 3 mm    Right 3 mm  Pupil reaction: Yes    Wiggles fingers: Left Yes Right Yes  Wiggles toes: Left Yes   Right Yes    Hand grasp:   Left  Present      Right  Present  Plantar flexion: Left  Present      Right   Present    PHYSICAL EXAM  General: No apparent distress, comfortable   HEENT: Trachea midline, no masses, Pupils equal round   Chest: Respiratory effort was normal with no retractions or use of accessory muscles. Cardiovascular: Extremities warm, well perfused  Abdomen:  Soft and non distended. No tenderness, guarding, rebound, or rigidity  Extremities: Moves all 4 extremeties, moderate pain with mobility of left lower extremity, no pedal edema. Left wrist deformity, chronic, no pain    Spine:   Spine Tenderness ROM   Cervical 0/10 Normal   Thoracic 0/10 Normal   Lumbar 0/10 Normal     Musculoskeletal:    Joint Tenderness Swelling ROM   Right shoulder Absent absent normal   Left shoulder absent absent normal   Right elbow absent absent normal   Left elbow absent absent normal   Right wrist absent absent normal   Left wrist absent absent normal   Right hand grasp Absent absent normal   Left hand grasp absent absent normal   Right hip absent absent normal   Left hip Present absent normal   Right knee Absent absent normal   Left knee Present absent Normal-with pain   Right ankle absent absent normal   Left ankle absent absent normal   Right foot Absent absent normal   Left foot absent absent normal       CONSULTS: Orthopedic surgery and Neurosurgery    PROCEDURES: Peripheral IVs    INJURIES: Possible left femoral fracture, possible right intraparenchymal hemorrhage    Principal Problem:    Fall from ground level  Resolved Problems:    * No resolved hospital problems. *        Assessment/Plan:   76-year-old female transferred from [facility] for possible right parietal hemorrhage.     Neuro:  GCS 15, treating right parietal 18 mm hyperdensity as hemorrhage  Patient has a history of known AVM, no imaging to prove at this time  Neurosurgery to evaluate  Continue Keppra 500 twice daily until proven AVM  Multimodal pain control includes scheduled Robaxin, Tylenol, as needed oxycodone  Patient takes Percocet fives daily  CV: HR near normal limits, history of hypertension and hyperlipidemia  Med rec today  As needed hydralazine and labetalol  Pulm: tolerating room air, no acute issues  GI: N.p.o. until Ortho/neurosurgery evaluation  Okay for general diet if no intention for OR intervention  Renal: no acute issues   Monitor electrolytes and replace as needed  ID: afebrile, no acute issues     Endocrine: no acute issues, history of diabetes  Glucoses seem to be well controlled  We will have low-dose sliding scale on board  MSK: Left knee/hip pain  Orthopedic surgery evaluated  Nonweightbearing to left lower extremity  CT for occult fracture  Heme: no acute issues      Bowel regime: Senna  Pain control/Sedation: stated as above  DVT prophylaxis: SCDs in the setting of potential head bleed  GI: diet n.p.o. until cleared by consultants  Glucose protocol: Low-dose sliding scale when diet is started  Mouth/Eye care: per patient.    Benito: none, external catheter  Code status:    Full Code    Patient/Family update:  As available     Disposition: Telemetry floor  If neurosurgery deems AVM and no head bleed, transfer to general floor      Electronically signed by Sandra Blount DO on 2/21/23 at 7:27 AM EST

## 2023-02-21 NOTE — CONSULTS
Department of Orthopedic Surgery  Resident Consult Note        Reason for Consult: Left knee pain    HISTORY OF PRESENT ILLNESS:       Patient is a 67 y.o. female who presented to the emergency department following a mechanical fall at her assisted living facility. She states that she uses a walker for ambulation at baseline in the room, and fell when her slipper became caught on the floor. She landed on her left side and also struck her head during the fall. She denies loss of consciousness. She was able to stand with assistance after her fall, however sitting for period time, she noted increasing left knee pain. She has history of bilateral total knee arthroplasty with subsequent fractures to both femurs, treated surgically. She reports no pain to the bilateral upper extremities. She has minimal pain to the right knee with her most significant pain being to the left knee. She has no changes in sensation. She denies use of any anticoagulation or antiplatelet agents. No additional complaints or concerns at this time.         Past Medical History:        Diagnosis Date    Anemia 2000    Anxiety     Depression     Hypertension     Osteoarthritis      Past Surgical History:        Procedure Laterality Date    CHOLECYSTECTOMY      GASTRIC BYPASS SURGERY  05/01/2000    HERNIA REPAIR      JOINT REPLACEMENT Bilateral 10/2012     Current Medications:   Current Facility-Administered Medications: hydrALAZINE (APRESOLINE) injection 10 mg, 10 mg, IntraVENous, Q1H PRN  labetalol (NORMODYNE;TRANDATE) injection 10 mg, 10 mg, IntraVENous, Q30 Min PRN  0.9 % sodium chloride infusion, , IntraVENous, Continuous  levETIRAcetam (KEPPRA) 500 mg/100 mL IVPB, 500 mg, IntraVENous, Q12H  sodium chloride flush 0.9 % injection 5-40 mL, 5-40 mL, IntraVENous, 2 times per day  sodium chloride flush 0.9 % injection 5-40 mL, 5-40 mL, IntraVENous, PRN  0.9 % sodium chloride infusion, , IntraVENous, PRN  bacitracin zinc ointment, , Topical, TID  ondansetron (ZOFRAN-ODT) disintegrating tablet 4 mg, 4 mg, Oral, Q8H PRN **OR** ondansetron (ZOFRAN) injection 4 mg, 4 mg, IntraVENous, Q6H PRN  sennosides-docusate sodium (SENOKOT-S) 8.6-50 MG tablet 1 tablet, 1 tablet, Oral, BID  polyethylene glycol (GLYCOLAX) packet 17 g, 17 g, Oral, Daily PRN  acetaminophen (TYLENOL) tablet 1,000 mg, 1,000 mg, Oral, 3 times per day  oxyCODONE (ROXICODONE) immediate release tablet 5 mg, 5 mg, Oral, Q4H PRN **OR** oxyCODONE HCl (OXY-IR) immediate release tablet 10 mg, 10 mg, Oral, Q4H PRN  methocarbamol (ROBAXIN) tablet 500 mg, 500 mg, Oral, 4x Daily  Allergies:  Ambien [zolpidem tartrate]    Social History:   TOBACCO:   reports that she is a non-smoker but has been exposed to tobacco smoke. She has never used smokeless tobacco.  ETOH:   reports no history of alcohol use. DRUGS:   reports no history of drug use.     Family History:       Problem Relation Age of Onset    Diabetes Mother     Dementia Mother     Diabetes Sister     Diabetes Son        REVIEW OF SYSTEMS:  CONSTITUTIONAL:  negative for  fevers, chills  EYES:  negative for blurred vision, visual disturbance  HEENT:  negative for  hearing loss, voice change  RESPIRATORY:  negative for  dyspnea, wheezing  CARDIOVASCULAR:  negative for  chest pain, palpitations  GASTROINTESTINAL:  negative for nausea, vomiting  GENITOURINARY:  negative for frequency, urinary incontinence  HEMATOLOGIC/LYMPHATIC:  negative for bleeding and petechiae  MUSCULOSKELETAL: See HPI  NEUROLOGICAL:  negative for headaches, dizziness  BEHAVIOR/PSYCH:  negative for increased agitation and anxiety    PHYSICAL EXAM:    VITALS:  BP (!) 156/65   Pulse 64   Temp 97.7 °F (36.5 °C) (Oral)   Resp 18   Ht 5' 2\" (1.575 m)   Wt 234 lb (106.1 kg)   SpO2 98%   BMI 42.80 kg/m²   CONSTITUTIONAL: Awake, alert, cooperative, obese    MUSCULOSKELETAL:    Left lower Extremity:  Skin is grossly intact about the extremity, surgical incisions are well healed about the knee and proximal thigh  Tenderness to palpation globally about the knee, with patient complaint of pain to palpation at any location about the distal femur, proximal tibia, and patella  Patient does not tolerate any active or passive motion of the left knee without complaint of pain  Gentle internal and external rotation of the hip does not produce pain  No tenderness to palpation about the ankle or foot  Motor function demonstrated to EHL, FHL, tibialis anterior, gastrocsoleus complex  Sensation grossly intact to sural, saphenous, tibial, deep peroneal, and superficial peroneal nerve distributions  Dorsalis pedis and posterior tibial pulses are present, foot warm and well-perfused  Compartments are soft and compressible    Secondary Exam:   bilateralUE: No obvious signs of trauma. -TTP to fingers, hand, wrist, forearm, elbow, humerus, shoulder or clavicle. -- Patient able to flex/extend fingers, wrist, elbow and shoulder with active and passive ROM without pain, +2/4 Radial pulse, cap refill <3sec, +AIN/PIN/Radial/Ulnar/Median N, distal sensation grossly intact to C4-T1 dermatomes, compartments soft and compressible. rightLE: No obvious signs of trauma. Surgical incisions are well-healed. Slight tenderness to palpation over the region of the lateral femoral condyle. Patient is able to hold the leg in a straight leg raise position. Internal and external rotation of the hip is well-tolerated. Motor function demonstrated to EHL, FHL, tibialis anterior, gastrosoleus complex. No tenderness to palpation about the foot or ankle. +2/4 DP & PT pulses, cap refill <3sec, +5/5 PF/DF/EHL, distal sensation grossly intact to L4-S1 dermatomes, compartments soft and compressible.     DATA:    CBC:   Lab Results   Component Value Date/Time    WBC 10.1 02/21/2023 12:04 AM    RBC 4.29 02/21/2023 12:04 AM    HGB 12.3 02/21/2023 12:04 AM    HCT 38.1 02/21/2023 12:04 AM    MCV 88.8 02/21/2023 12:04 AM    MCH 28.7 02/21/2023 12:04 AM    MCHC 32.3 02/21/2023 12:04 AM    RDW 13.2 02/21/2023 12:04 AM     02/21/2023 12:04 AM    MPV 9.5 02/21/2023 12:04 AM     PT/INR:  No results found for: PROTIME, INR    Radiology Review:  Radiographs of the left knee reviewed, 2 views. These images demonstrate total knee arthroplasty hardware in place. There is also retrograde intramedullary nail fixation and a healed fracture at the metaphyseal region of the distal femur. There is possibly an acute injury versus osteophyte off of the medial condyle of the femur. This does not appear to involve native bone. Components do not demonstrate evidence of loosening. Radiographs of the left hip and pelvis reviewed. These images again demonstrate the intramedullary nail fixation without any evidence of failure or loosening proximally. There is no evidence of femoral neck fracture. Radiographs of the right knee reviewed. Images demonstrate total knee arthroplasty hardware in place. There is additionally a distal femoral plate which appears to be well fixed. No acute fractures or dislocations noted. Radiographs of the pelvis reviewed, 2 views. These images demonstrate retrograde intramedullary fixation on the left hip which demonstrates no evidence of loosening. There are degenerative changes present to both hip joints. Images are ultimately difficult to visualize secondary to soft tissue, though there are no obvious fractures or dislocations present. IMPRESSION:  Left knee pain, patient is status post left total knee arthroplasty and retrograde intramedullary nail fixation of previous periprosthetic fracture. No convincing fractures on radiographs and her old fracture appears to be healed. Physical exam is concerning for the possibility of occult fracture although the fact that she was able to ambulate initially after her fall is reassuring. Exam is less consistent with any pathology of the hip.   Right knee pain, less severe than the left knee. Hardware is in place from total knee arthroplasty and plate and screws from periprosthetic femur fracture. Osteoporosis    PLAN:  Given the physical exam findings of the left knee concerning for occult fracture we will proceed with CT imaging to evaluate the distal femur and proximal tibia further  Nonweightbearing to the left lower extremity pending additional imaging  No restrictions to the right lower extremity  Pain medication per admitting service  DVT prophylaxis at the discretion of admitting service  PT/OT when appropriate after imaging has resulted  No acute orthopedic interventions anticipated at this time, however we will review additional imaging and make plans accordingly.   Discuss with attending

## 2023-02-21 NOTE — DISCHARGE SUMMARY
Physician Discharge Summary     Patient ID:  David Sim  [id number]  1 y.o.  1950    Admit date: 2/21/2023    Discharge date and time: No discharge date for patient encounter. Admitting Physician: Eleonora Soto MD     Admission Diagnoses: Injury of head, initial encounter [S09.90XA]  Fall from standing, initial encounter [W19. XXXA]  Fall from ground level Estrella Confer  Right knee pain, unspecified chronicity [M25.561]    Discharge Diagnoses: Principal Problem:    Fall from ground level  Active Problems:    Atrial fibrillation ([address]    Intraparenchymal hemorrhage of brain ([address]    Gait instability    Essential hypertension  Resolved Problems:    * No resolved hospital problems. *      Admission Condition: fair    Discharged Condition: stable    Indication for Admission: mechanical fall    Hospital Course/Procedures/Operation/treatments:   2/21: Patient presents as a trauma consult, mechanical fall, transferred from [facility] for possible right-sided parietal intracranial hemorrhage. She underwent CT C-spine and head at our facility. Found to have other stable bleed versus AVM. Neurosurgery to evaluate. Patient additionally had left-sided lower extremity pain. Underwent x-rays and orthopedic surgery evaluation. 2/22: Evaluation of intracranial abnormality with MRI, noted to have UTI on UA, started on nitrofurantoin for twice daily for 5 days. Additionally complaining of itchy groin pain, intertrigo treated with clotrimazole. Ultimately patient was found to have no fractures and orthopedic surgery said weightbearing as tolerated. 2/23: Underwent open MRI for evaluation of intracranial hemorrhage in the setting of known AVM. MRI reviewed indicates no intracranial bleeding, findings consistent with AVM. EP seen patient for atrial fibrillation recommend anticoagulation and heart monitor on discharge.   2/24: Case management discussion regarding patient going home to her nursing home versus possibly needing acute rehab. No obvious orthopedic injuries but continued left hip pain. PT OT 9 out of 24. Awaiting neurosurgery recommendation regarding final okay to start Eliquis for patient's atrial fibrillation. 2/25: No acute events overnight, approved for [facility] [employer] for discharge. Consults:   IP CONSULT TO TRAUMA SURGERY  IP CONSULT TO ELECTROPHYSIOLOGY    Significant Diagnostic Studies:   XR PELVIS (1-2 VIEWS)    Result Date: 2/21/2023  EXAMINATION: ONE XRAY VIEW OF THE PELVIS 2/21/2023 3:03 am COMPARISON: None. HISTORY: ORDERING SYSTEM PROVIDED HISTORY: Trauma fall TECHNOLOGIST PROVIDED HISTORY: Reason for exam:->trauma fall What reading provider will be dictating this exam?->CRC FINDINGS: Abnormal appearance bilateral femoral necks concerning for fracture at either site. Significant overlying soft tissue obscures cortical detail. Question minimally displaced fractures right superior and inferior pubic rami. Soft tissues unremarkable. Proximal intramedullary fixation left femur partially observed. Abnormal appearance bilateral femoral necks concerning for fracture at either site. Question minimally displaced fractures right superior and inferior pubic rami. Significant overlying soft tissue obscures cortical detail. Consider CT evaluation. RECOMMENDATION: Careful clinical correlation and follow up recommended. XR KNEE LEFT (1-2 VIEWS)    Result Date: 2/21/2023  EXAMINATION: TWO XRAY VIEWS OF THE LEFT KNEE 2/21/2023 2:01 am COMPARISON: None. HISTORY: ORDERING SYSTEM PROVIDED HISTORY: pain TECHNOLOGIST PROVIDED HISTORY: Reason for exam:->pain What reading provider will be dictating this exam?->CRC FINDINGS: No fracture, dislocation or osseous lesion. Total knee arthroplasty intact. Distal femoral intramedullary fixation intact. No effusion. Soft tissues unremarkable. No acute disease. RECOMMENDATION: Careful clinical correlation and follow up recommended.      XR KNEE RIGHT (1-2 VIEWS)    Result Date: 2/21/2023  EXAMINATION: TWO XRAY VIEWS OF THE RIGHT KNEE 2/21/2023 5:59 am COMPARISON: None. HISTORY: ORDERING SYSTEM PROVIDED HISTORY: knee pain after fall TECHNOLOGIST PROVIDED HISTORY: Please include AP/lateral Reason for exam:->knee pain after fall What reading provider will be dictating this exam?->CRC FINDINGS: No fracture, dislocation or osseous lesion. Total knee arthroplasty. Distal femoral ORIF partially visualized. All hardware intact. No effusion. Soft tissues unremarkable. No acute disease. RECOMMENDATION: Careful clinical correlation and follow up recommended. CT HEAD WO CONTRAST    Result Date: 2/21/2023  EXAMINATION: CT OF THE HEAD WITHOUT CONTRAST  2/21/2023 3:23 am TECHNIQUE: CT of the head was performed without the administration of intravenous contrast. Automated exposure control, iterative reconstruction, and/or weight based adjustment of the mA/kV was utilized to reduce the radiation dose to as low as reasonably achievable. COMPARISON: None available. HISTORY: ORDERING SYSTEM PROVIDED HISTORY: questionable ICH TECHNOLOGIST PROVIDED HISTORY: Reason for exam:->questionable ICH Has a \"code stroke\" or \"stroke alert\" been called? ->No Decision Support Exception - unselect if not a suspected or confirmed emergency medical condition->Emergency Medical Condition (MA) What reading provider will be dictating this exam?->CRC FINDINGS: BRAIN/VENTRICLES: 18 mm hyperdensity within the right parietal deep white matter suggesting hemorrhage versus mass versus dystrophic calcification. Prior imaging not available on this database for direct comparison. No significant mass effect or surrounding edema. No abnormal extra-axial fluid collection. The gray-white differentiation is maintained without evidence of an acute infarct. There is no evidence of hydrocephalus. ORBITS: The visualized portion of the orbits demonstrate no acute abnormality.  SINUSES: The visualized paranasal sinuses and mastoid air cells demonstrate no acute abnormality. SOFT TISSUES/SKULL:  No acute abnormality of the visualized skull or soft tissues. 18 mm hyperdensity within the right parietal deep white matter suggesting hemorrhage versus mass. Prior imaging not available on this database for direct comparison. No significant mass effect or surrounding edema. RECOMMENDATIONS: Careful clinical correlation and follow up recommended. MRI evaluation recommended. XR CHEST PORTABLE    Result Date: 2/21/2023  EXAMINATION: ONE XRAY VIEW OF THE CHEST 2/21/2023 3:03 am COMPARISON: None. HISTORY: ORDERING SYSTEM PROVIDED HISTORY: Trauma fall TECHNOLOGIST PROVIDED HISTORY: Reason for exam:->trauma fall What reading provider will be dictating this exam?->CRC FINDINGS: Normal cardiomediastinal silhouette. Lungs clear. No pneumothorax or effusion. Osseous thorax intact. No acute disease. RECOMMENDATION: Careful clinical correlation and follow up recommended. XR HIP 2-3 VW W PELVIS LEFT    Result Date: 2/21/2023  EXAMINATION: ONE XRAY VIEW OF THE PELVIS AND TWO XRAY VIEWS LEFT HIP 2/21/2023 5:58 am COMPARISON: 2 hours prior. HISTORY: ORDERING SYSTEM PROVIDED HISTORY: left hip pain, fall TECHNOLOGIST PROVIDED HISTORY: Reason for exam:->left hip pain, fall What reading provider will be dictating this exam?->CRC FINDINGS: Osteopenia. No visible fracture. Right pubic ramus appears intact on the submitted images. ORIF hardware bilateral femurs partially visualized. Soft tissues unremarkable. No visible fracture. RECOMMENDATION: Careful clinical correlation and follow up recommended. Discharge Exam:  Vitals:    02/25/23 1533   BP: (!) 132/59   Pulse: 50   Resp: 16   Temp: 98.1 °F (36.7 °C)   SpO2: 96%    Constitutional:       Appearance: Normal appearance. She is obese. HENT:      Head: Normocephalic and atraumatic.       Nose: Nose normal.      Mouth/Throat:      Mouth: Mucous membranes are moist. Pharynx: Oropharynx is clear. Eyes:      Extraocular Movements: Extraocular movements intact. Pupils: Pupils are equal, round, and reactive to light. Cardiovascular:      Rate and Rhythm: Normal rate and regular rhythm. Pulses: Normal pulses. Heart sounds: Normal heart sounds. Pulmonary:      Effort: Pulmonary effort is normal.      Breath sounds: Normal breath sounds. Abdominal:      General: There is no distension. Palpations: Abdomen is soft. Tenderness: There is no abdominal tenderness. Musculoskeletal:         General: No tenderness or signs of injury. Cervical back: Normal range of motion and neck supple. Skin:     General: Skin is warm and dry. Neurological:      General: No focal deficit present. Mental Status: She is alert and oriented to person, place, and time. Psychiatric:         Mood and Affect: Mood normal.         Behavior: Behavior normal.         Thought Content: Thought content normal.         Judgment: Judgment normal.     Disposition: home    In process/preliminary results:  Outstanding Order Results       No orders found from 1/23/2023 to 2/22/2023. Patient Instructions:   Current Discharge Medication List             Details   apixaban (ELIQUIS) 5 MG TABS tablet Take 1 tablet by mouth 2 times daily  Qty: 60 tablet, Refills: 1      methocarbamol (ROBAXIN) 500 MG tablet Take 1 tablet by mouth 4 times daily for 10 days  Qty: 40 tablet, Refills: 0      nitrofurantoin, macrocrystal-monohydrate, (MACROBID) 100 MG capsule Take 1 capsule by mouth every 12 hours for 3 doses  Qty: 3 capsule, Refills: 0      polyethylene glycol (GLYCOLAX) 17 g packet Take 17 g by mouth daily as needed for Constipation  Qty: 527 g, Refills: 1      oxyCODONE (ROXICODONE) 5 MG immediate release tablet Take 1 tablet by mouth every 6 hours as needed for Pain for up to 7 days.  Max Daily Amount: 20 mg  Qty: 28 tablet, Refills: 0    Comments: Reduce doses taken as pain becomes manageable  Associated Diagnoses: Injury of head, initial encounter                Details   aspirin 81 MG EC tablet Take 81 mg by mouth daily      Benzocaine-Menthol (CEPACOL) 6-10 MG LOZG lozenge Take 1 lozenge by mouth every 2 hours as needed for Sore Throat      calcium carbonate (TUMS) 500 MG chewable tablet Take 2 tablets by mouth 2 times daily as needed for Heartburn      vitamin D (CHOLECALCIFEROL) 50 MCG (2000 UT) TABS tablet Take 4,000 Units by mouth daily      clotrimazole-betamethasone (LOTRISONE) 1-0.05 % cream Apply topically 2 times daily as needed (apply to rash)      DULoxetine (CYMBALTA) 60 MG extended release capsule Take 60 mg by mouth daily      famotidine (PEPCID) 40 MG tablet Take 40 mg by mouth daily      Lactobacillus Probiotic TABS Take 1 tablet by mouth daily      lisinopril (PRINIVIL;ZESTRIL) 10 MG tablet Take 10 mg by mouth daily      loperamide (IMODIUM) 2 MG capsule Take 2 mg by mouth 4 times daily as needed for Diarrhea      nortriptyline (PAMELOR) 25 MG capsule Take 25 mg by mouth nightly      nystatin (MYCOSTATIN) 699234 UNIT/GM powder Apply topically 2 times daily Apply to groin folds and under bilateral folds      ondansetron (ZOFRAN-ODT) 4 MG disintegrating tablet Take 4 mg by mouth every 6 hours as needed for Nausea or Vomiting      oxybutynin (DITROPAN-XL) 10 MG extended release tablet Take 20 mg by mouth at bedtime      pantoprazole (PROTONIX) 40 MG tablet Take 40 mg by mouth 2 times daily      pyridoxine (B-6) 100 MG tablet Take 100 mg by mouth 3 times daily as needed      potassium chloride (KLOR-CON M) 10 MEQ extended release tablet Take 10 mEq by mouth daily      risperiDONE (RISPERDAL) 0.25 MG tablet Take 0.25 mg by mouth 2 times daily      rOPINIRole (REQUIP) 2 MG tablet Take 2 mg by mouth nightly      temazepam (RESTORIL) 15 MG capsule Take 15 mg by mouth at bedtime.       zinc gluconate 50 MG tablet Take 50 mg by mouth daily      Multiple Vitamins-Minerals (WOMENS 50+ MULTI VITAMIN/MIN) TABS Take 1 tablet by mouth daily      Omega-3 Fatty Acids (FISH OIL) 1000 MG CAPS Take 1,000 mg by mouth daily      ferrous sulfate 325 (65 FE) MG tablet Take 325 mg by mouth daily (with breakfast). TRAUMA SERVICES DISCHARGE INSTRUCTIONS    Call [phone], option 2, for any questions/concerns and for follow-up appointment in 1 week(s). Please follow the instructions checked below:  Please follow-up with your primary care provider. ACTIVITY INSTRUCTIONS  Increase activity as tolerated  No heavy lifting or strenuous activity  Take your incentive spirometer home and use 4-6 times/day   [x]  No driving until cleared by Neurosurgery, Dr. Adelaide Chatman    WOUND/DRESSING INSTRUCTIONS:  You may shower. No sitting in bath tub, hot tub or swimming until cleared by physician. Ice to areas of pain for first 24 hours. Heat to areas of pain after that. Wash areas of lacerations/abrasions with soap & water. Rinse well. Pat dry with clean towel. Apply thin layer of Bacitracin, Neosporin, or triple antibiotic cream to affected area 2-3 times per day. Keep wounds clean and dry. MEDICATION INSTRUCTIONS  Take medication as prescribed. When taking pain medications, you may experience dizziness or drowsiness. Do not drink alcohol or drive when taking these medications. You may experience constipation while taking pain medication. You may take over the counter stool softeners such as docusate (Colace), sennosides S (Senokot-S), or Miralax.   []  You may take Ibuprofen (over the counter) as directed for mild pain. --You may take up to 800mg every 8 hours for pain, please take with food or milk.   []  You may take acetaminophen (Tylenol) products. Do NOT take more than 4000mg of Tylenol in 24h. OPIOID MEDICATION INSTRUCTIONS  Read the medication guide that is included with your prescription. Take your medication exactly as prescribed.   Store medication away from children and in a safe place. Do NOT share your medication with others. Do NOT take medication unless it is prescribed for you. Do NOT drink alcohol while taking opioids (I.e., Norco, Percocet, Oxycodone, etc). Discuss with the Trauma Clinic staff if the dose of medication you are taking does not control your pain and any side effects that you may be having. CALL 911 OR YOUR LOCAL EMERGENCY SERVICE:  --If you take too much medication  --If you have trouble breathing or shortness of breath  --A child has taken this medication. WORK:  You may not return to work until you receive follow-up with the Trauma Clinic or clearance by all consultants. Call the trauma clinic for any of the following or for questions/concerns;  --fever over 101F  --redness, swelling, hardness or warmth at the wound site(s). --Unrelieved nausea/vomiting  --Foul smelling or cloudy drainage at the wound site(s)  --Unrelieved pain or increase in pain  --Increase in shortness of breath    Follow-up:  Trauma Clinic: [phone] [place], [place]  [address]    MILD TRAUMATIC BRAIN INJURY OR CONCUSSION  A mild traumatic brain injury (TBI) is one that causes some degree of injury to the brain causing symptoms ranging from a brief period of confusion to loss of consciousness (being knocked out). There is no major bruising or bleeding in the brain but symptoms can last from hours to months depending on the severity of the injury. Family or friends need to observe any change in behavior for the next 48 hours. Delayed effects from head injury do occur occasionally and can be due to slow bleeding or swelling around the surface of the brain. These effects may occur even if the x-rays/CT scans were normal.  Please observe the following symptoms during the next 24-48 hours.     CALL 911 if:  Pupils (black part in the center of the eye) are unequal in size, and this is (convulsion). Not responding to others/won't wake up or very hard to wake up  Faints (passes out)  Vomiting more than 3 times    Notify the TRAUMA CLINIC if any of the following symptoms occur:  Severe headache -- Mild headache may last for days. Report worsening pain or uncontrolled pain with prescribed medicine. Numbness, tingling or weakness -- Present in arms or legs; unsteady walking. Eye Changes/light sensation -- Vision problems; blurred or double-vision; unequal sized pupils. Nausea/Vomiting -- Episodes of vomiting may occur initially after a head injury. Persistent vomiting or difficulty taking medication by mouth. Increased Sleepiness -- Difficulty waking from sleep with increased confusion. Dizziness -- Does not go away or occurs repeatedly. Vomiting may accompany dizziness. Drainage -- Clear fluid or blood from the nose and ears. Fever -- Temperature over 101 degrees. Neck Pain. The First Four Weeks  The symptoms below are common after a mild brain injury. They usually get better on their own within a few weeks:   feeling tired or ?low  problems falling or staying asleep  feeling confused, poor concentration, or slow to answer questions  feeling dizzy, poor balance, or poor coordination  being sensitive to light  being sensitive to sounds  ringing in the ears  a mild headache, sometimes with nausea and/or vomiting  being irritable, having mood swings, or feeling somewhat sad or down  Contact the [address] [place] [address] Road if your symptoms are affecting your everyday activities. Remember that letting yourself get too tired can make your symptoms worse. Listen to your body: if doing a certain activity increases your symptoms, take a break from that activity. Build up the amount of time you do an activity and stay under the threshold of symptoms. Long term Effects (Post-Concussive Syndrome)    Notify physician if any of the following persists longer than 2-4 weeks.     Difficulty with concentration concentration or attention (easily distracted)  Frequent headaches  Memory problems   Sensitivity to light   Sleeping difficulties      There is a higher risk of having a more serious head injury if:  Previous history of head injury or concussion  Taking medicine that thins your blood, or have a bleeding disorder  Have other neurologic (brain) problems  Have difficulty walking or frequent falls  Active in high impact contact sports, like soccer or football. Activities  Stay away from activities that could cause another head injury (like sports), until the doctor says it's okay. A second blow to the head can cause more damage to the brain  Limit reading, television, video games, etc. the first 48 hours. Your brain needs to rest so that it can recover. You may find that it helps to take time off school or work. Limit exposure to bright lights, loud noises, and crowds for the first 48 hours, as these can make your symptoms worse  Limit use of screens, such as an IPad, computer, cellphone, TV, etc, as these can make symptoms, especially headaches, worse. Work/School  It is recommended that you wait to return to work/school until after your follow-up appointment with trauma or your family doctor. If you are having no symptoms, please call for an earlier appointment  Some people find it hard to concentrate well so return to your normal activities slowly. Go back to work or school for half days at first, and increase as tolerated. Trauma services can help you with a graduated schedule. If you feel comfortable doing so, tell work or school about your concussion. You may have to adjust your activities, depending on your job or school demands. Rest and Sleep  Rest for the first 24 hours; it's one of the best things to help your brain recover. It's okay to sleep if you want  You don't have to be woken up every few hours. If someone does wake you up, you should awaken easily.     Do some light physical activity (housework) or light exercise (walking, stationary bike) as soon as you can tolerate the movement. Strenuous exercise (such as jogging or weight lifting) can make your concussion symptoms worse or last longer. Diet  Return to a normal diet as tolerated, you may want to start with liquids first  Eat healthy meals (including breakfast) and snacks throughout the day as your brain heals. Managing Pain  Tylenol or Ibuprofen are the best meds to take for headaches. Your doctor may have prescribed you medications if your headaches were severe or you have other injuries. Please take as directed. Driving  Your ability to concentrate and react quickly might be affected by the concussion. Please contact trauma services for advice on when to resume driving. Do not drive if you're concerned about vision problems, slowed thinking, slowed reaction time, reduced attention or poor judgment. Wear sunglasses even during winter if leyla while driving. The bright light may induce a headache. Alcohol use/Drug use  Don't drink alcohol or use recreational drugs as they may make you feel worse and/or hide the warning signs. Follow-up:  Trauma Clinic: [phone] -- press option 2   [address], [address] CONSIDERATIONS FOR OUR PATIENTS OVER THE AGE OF 65Y    Getting around your home safely can be a challenge if you have injuries or health problems that make it easy for you to fall. Loose rugs and furniture in walkways are among the dangers for many older people who have problems walking or who have poor eyesight. People who have conditions such as arthritis, osteoporosis, or dementia also must be careful not to fall. You can make your home safer with a few simple measures. Follow-up care is a key part of your treatment and safety.  Be sure to make and go to all appointments, and call your doctor or nurse call line if you are having problems. It's also a good idea to know your test results and keep a list of the medicines you take. How can you care for yourself at home? Taking care of yourself  You may get dizzy if you do not drink enough water. To prevent dehydration, drink plenty of fluids, enough so that your urine is light yellow or clear like water. Choose water and other caffeine-free clear liquids. If you have kidney, heart, or liver disease and have to limit fluids, talk with your doctor before you increase the amount of fluids you drink. Exercise regularly to improve your strength, muscle tone, and balance. Walk if you can. Swimming may be a good choice if you cannot walk easily. Have your vision and hearing checked each year or any time you notice a change. If you have trouble seeing and hearing, you might not be able to avoid objects and could lose your balance. Know the side effects of the medicines you take. Ask your doctor or pharmacist whether the medicines you take can affect your balance. Sleeping pills or sedatives can affect your balance. Limit the amount of alcohol you drink. Alcohol can impair your balance and other senses. Ask your doctor whether calluses or corns on your feet need to be removed. If you wear loose-fitting shoes because of calluses or corns, you can lose your balance and fall. Talk to your doctor if you have numbness in your feet. Preventing falls at home  Remove raised doorway thresholds, throw rugs, and clutter. Repair loose carpet or raised areas in the floor. Move furniture and electrical cords to keep them out of walking paths. Use non-skid floor wax, and wipe up spills right away, especially on ceramic tile floors. If you use a walker or cane, put rubber tips on it. If you use crutches, clean the bottoms of them regularly with an abrasive pad, such as steel wool. Keep your house well lit, especially stairways, porches, and outside walkways.  Use night-lights in areas such as hallways and washrooms. Add extra light switches or use remote switches (such as switches that go on or off when you clap your hands) to make it easier to turn lights on if you have to get up during the night. Install sturdy handrails on stairways. Move items in your cabinets so that the things you use a lot are on the lower shelves (about waist level). Keep a cordless phone and a flashlight with new batteries by your bed. If possible, put a phone in each of the main rooms of your house, or carry a cell phone in case you fall and cannot reach a phone. Or, you can wear a device around your neck or wrist. You push a button that sends a signal for help. Wear low-heeled shoes that fit well and give your feet good support. Use footwear with non-skid soles. Check the heels and soles of your shoes for wear. Repair or replace worn heels or soles. Do not wear socks without shoes on wood floors. Walk on the grass when the sidewalks are slippery. If you live in an area that gets snow and ice in the winter, sprinkle salt on slippery steps and sidewalks. Preventing falls in the bath  Install grab bars and non-skid mats inside and outside your shower or tub and near the toilet and sinks. Use shower chairs and bath benches. Use a hand-held shower head that will allow you to sit while showering. Get into a tub or shower by putting the weaker leg in first. Get out of a tub or shower with your strong side first.  Repair loose toilet seats and consider installing a raised toilet seat to make getting on and off the toilet easier. Keep your washroom door unlocked while you are in the shower. Follow-up:  Trauma Clinic: [phone] option 2  Art [place]aw, [id number] [address]     Cardiac Electrophysiology discharge instructions: You have been seen by cardiac electrophysiology for new diagnosis of atrial fibrillation.   You have been seen by Electrophysiologist:  Santo Noble CNP    You were found to have atrial fibrillation with slow heart rates at times. We recommended anticoagulation to reduce your risk of stroke and atrial fibrillation. You were started on Eliquis 5 mg twice daily and you should continue this medication. We also recommended 2-week heart monitor upon discharge from the hospital to assess rate and rhythm. We would like to see you in the office in about 4 to 6 weeks to discuss the findings of this monitor and discuss management of atrial fibrillation. You should follow-up appointment in about 4 weeks in the office on the second floor of the electrophysiology office on [facility] in [facility]. Please call [phone] for any changes in this appointment.        IMPORTANT PHONE NUMBERS:  [facility]:    - Phone # [phone]    Follow up:   [address]  5 [facility] Ryan Gould [facility] [id number]  Schedule an appointment as soon as possible for a visit in 1 week(s)  For follow up    JenniferDividechel Taylor, [address] [place] [address]  [phone]    Schedule an appointment as soon as possible for a visit in 3 week(s)  For follow up    Cb Blackwood MD  [facility]  [address] [facility] [phone]    Call  As needed    Maris Wynne MD  [address] [facility] [phone]    Call in 4 week(s)      [facility] [address] [place] [address]  [phone]         Signed:  Chiquita Morales DO  2/25/2023  3:45 PM

## 2023-02-21 NOTE — CONSULTS
Neurosurgery Consult Note      CHIEF COMPLAINT: fall from standing height    HPI:  Mechanical fall, injury occurring approximately 7 PM 2/20. States she tripped over her slippers, falling forwards but landing on her side. Patient lives in a skilled nursing home. She states she hit the back of her head. There is no laceration or obvious abrasion. Denies LOC. Denies use of anticoagulation. EMR states aspirin is a daily med, she denies using aspirin. She denies any neck pain, back pain. She has chronic left and right knee pain. States that her left knee hurts more after the fall than it usually does. Patient was evaluated at [facility] found to have possible right parietal intracranial hemorrhage. He was transferred to a trauma level 1 center for higher level care and neurosurgery evaluation. Patient additionally states she has a known intracranial AVM. Patient is GCS 15 without focal deficit. Past Medical History:   Diagnosis Date    Anemia 2000    Anxiety     Depression     Hypertension     Osteoarthritis      Past Surgical History:   Procedure Laterality Date    CHOLECYSTECTOMY      GASTRIC BYPASS SURGERY  05/01/2000    HERNIA REPAIR      JOINT REPLACEMENT Bilateral 10/2012     Ambien [zolpidem tartrate]  Prior to Admission medications    Medication Sig Start Date End Date Taking?  Authorizing Provider   aspirin 81 MG EC tablet Take 81 mg by mouth daily   Yes Historical Provider, MD   Benzocaine-Menthol (CEPACOL) 6-10 MG LOZG lozenge Take 1 lozenge by mouth every 2 hours as needed for Sore Throat   Yes Historical Provider, MD   calcium carbonate (TUMS) 500 MG chewable tablet Take 2 tablets by mouth 2 times daily as needed for Heartburn   Yes Historical Provider, MD   vitamin D (CHOLECALCIFEROL) 50 MCG (2000 UT) TABS tablet Take 4,000 Units by mouth daily   Yes Historical Provider, MD   clotrimazole-betamethasone (LOTRISONE) 1-0.05 % cream Apply topically 2 times daily as needed (apply to rash) Yes Historical Provider, MD   DULoxetine (CYMBALTA) 60 MG extended release capsule Take 60 mg by mouth daily   Yes Historical Provider, MD   famotidine (PEPCID) 40 MG tablet Take 40 mg by mouth daily   Yes Historical Provider, MD   HYDROcodone-acetaminophen (NORCO) 5-325 MG per tablet Take 1 tablet by mouth at bedtime. Yes Historical Provider, MD   HYDROcodone-acetaminophen (NORCO) 5-325 MG per tablet Take 1 tablet by mouth every 6 hours as needed for Pain. Yes Historical Provider, MD   Lactobacillus Probiotic TABS Take 1 tablet by mouth daily   Yes Historical Provider, MD   lisinopril (PRINIVIL;ZESTRIL) 10 MG tablet Take 10 mg by mouth daily   Yes Historical Provider, MD   loperamide (IMODIUM) 2 MG capsule Take 2 mg by mouth 4 times daily as needed for Diarrhea   Yes Historical Provider, MD   nortriptyline (PAMELOR) 25 MG capsule Take 25 mg by mouth nightly   Yes Historical Provider, MD   nystatin (MYCOSTATIN) 394385 UNIT/GM powder Apply topically 2 times daily Apply to groin folds and under bilateral folds   Yes Historical Provider, MD   ondansetron (ZOFRAN-ODT) 4 MG disintegrating tablet Take 4 mg by mouth every 6 hours as needed for Nausea or Vomiting   Yes Historical Provider, MD   oxybutynin (DITROPAN-XL) 10 MG extended release tablet Take 20 mg by mouth at bedtime   Yes Historical Provider, MD   pantoprazole (PROTONIX) 40 MG tablet Take 40 mg by mouth 2 times daily   Yes Historical Provider, MD   pyridoxine (B-6) 100 MG tablet Take 100 mg by mouth 3 times daily as needed   Yes Historical Provider, MD   potassium chloride (KLOR-CON M) 10 MEQ extended release tablet Take 10 mEq by mouth daily   Yes Historical Provider, MD   risperiDONE (RISPERDAL) 0.25 MG tablet Take 0.25 mg by mouth 2 times daily   Yes Historical Provider, MD   rOPINIRole (REQUIP) 2 MG tablet Take 2 mg by mouth nightly   Yes Historical Provider, MD   temazepam (RESTORIL) 15 MG capsule Take 15 mg by mouth at bedtime.    Yes Historical Provider, MD   zinc gluconate 50 MG tablet Take 50 mg by mouth daily   Yes Historical Provider, MD   Multiple Vitamins-Minerals (WOMENS 50+ MULTI VITAMIN/MIN) TABS Take 1 tablet by mouth daily    Historical Provider, MD   Omega-3 Fatty Acids (FISH OIL) 1000 MG CAPS Take 1,000 mg by mouth daily    Historical Provider, MD   ferrous sulfate 325 (65 FE) MG tablet Take 325 mg by mouth daily (with breakfast). Historical Provider, MD     Outpatient Medications Marked as Taking for the 2/21/23 encounter [facility] Encounter)   Medication Sig Dispense Refill    aspirin 81 MG EC tablet Take 81 mg by mouth daily      Benzocaine-Menthol (CEPACOL) 6-10 MG LOZG lozenge Take 1 lozenge by mouth every 2 hours as needed for Sore Throat      calcium carbonate (TUMS) 500 MG chewable tablet Take 2 tablets by mouth 2 times daily as needed for Heartburn      vitamin D (CHOLECALCIFEROL) 50 MCG (2000 UT) TABS tablet Take 4,000 Units by mouth daily      clotrimazole-betamethasone (LOTRISONE) 1-0.05 % cream Apply topically 2 times daily as needed (apply to rash)      DULoxetine (CYMBALTA) 60 MG extended release capsule Take 60 mg by mouth daily      famotidine (PEPCID) 40 MG tablet Take 40 mg by mouth daily      HYDROcodone-acetaminophen (NORCO) 5-325 MG per tablet Take 1 tablet by mouth at bedtime. HYDROcodone-acetaminophen (NORCO) 5-325 MG per tablet Take 1 tablet by mouth every 6 hours as needed for Pain.       Lactobacillus Probiotic TABS Take 1 tablet by mouth daily      lisinopril (PRINIVIL;ZESTRIL) 10 MG tablet Take 10 mg by mouth daily      loperamide (IMODIUM) 2 MG capsule Take 2 mg by mouth 4 times daily as needed for Diarrhea      nortriptyline (PAMELOR) 25 MG capsule Take 25 mg by mouth nightly      nystatin (MYCOSTATIN) 806997 UNIT/GM powder Apply topically 2 times daily Apply to groin folds and under bilateral folds      ondansetron (ZOFRAN-ODT) 4 MG disintegrating tablet Take 4 mg by mouth every 6 hours as needed for Nausea or Vomiting      oxybutynin (DITROPAN-XL) 10 MG extended release tablet Take 20 mg by mouth at bedtime      pantoprazole (PROTONIX) 40 MG tablet Take 40 mg by mouth 2 times daily      pyridoxine (B-6) 100 MG tablet Take 100 mg by mouth 3 times daily as needed      potassium chloride (KLOR-CON M) 10 MEQ extended release tablet Take 10 mEq by mouth daily      risperiDONE (RISPERDAL) 0.25 MG tablet Take 0.25 mg by mouth 2 times daily      rOPINIRole (REQUIP) 2 MG tablet Take 2 mg by mouth nightly      temazepam (RESTORIL) 15 MG capsule Take 15 mg by mouth at bedtime. zinc gluconate 50 MG tablet Take 50 mg by mouth daily       Social History     Socioeconomic History    Marital status:      Spouse name: Not on file    Number of children: Not on file    Years of education: Not on file    Highest education level: Not on file   Occupational History     Employer: RETIRED   Tobacco Use    Smoking status: Passive Smoke Exposure - Never Smoker    Smokeless tobacco: Never   Vaping Use    Vaping Use: Never used   Substance and Sexual Activity    Alcohol use: No    Drug use: No    Sexual activity: Not on file   Other Topics Concern    Not on file   Social History Narrative    Not on file     Social Determinants of Health     Financial Resource Strain: Not on file   Food Insecurity: Not on file   Transportation Needs: Not on file   Physical Activity: Not on file   Stress: Not on file   Social Connections: Not on file   Intimate Partner Violence: Not on file   Housing Stability: Not on file     Family History   Problem Relation Age of Onset    Diabetes Mother     Dementia Mother     Diabetes Sister     Diabetes Son        ROS: Complete 10 point ROS was obtained with positives in HPI, otherwise:  Pt denies fevers, denies chills. Pt denies chest pain, denies SOB, denies nausea, denies vomiting, denies headache.       VITALS/DRAINS:   VITALS:  BP (!) 113/51   Pulse 63   Temp 98.3 °F (36.8 °C)   Resp 16   Ht 5' 2\" (1.575 m)   Wt 234 lb (106.1 kg)   SpO2 98%   BMI 42.80 kg/m²   24HR INTAKE/OUTPUT:    Intake/Output Summary (Last 24 hours) at 2/21/2023 1539  Last data filed at 2/21/2023 0531  Gross per 24 hour   Intake 500 ml   Output --   Net 500 ml       EXAMINATION: Evaluated in the emergency room patient laying on the gurney awake alert oriented speech is fluent  Cranial Nerves: Pupils equal round react to light extraocular is full facial expression are symmetric tongue midline  Motor: No focal motor deficits  Sensory: No focal sensory deficits  Cerebellar:  Gait:  DTRs:    IMAGING STUDIES:  XR PELVIS (1-2 VIEWS)    Result Date: 2/21/2023  EXAMINATION: ONE XRAY VIEW OF THE PELVIS 2/21/2023 3:03 am COMPARISON: None. HISTORY: ORDERING SYSTEM PROVIDED HISTORY: Trauma fall TECHNOLOGIST PROVIDED HISTORY: Reason for exam:->trauma fall What reading provider will be dictating this exam?->CRC FINDINGS: Abnormal appearance bilateral femoral necks concerning for fracture at either site. Significant overlying soft tissue obscures cortical detail. Question minimally displaced fractures right superior and inferior pubic rami. Soft tissues unremarkable. Proximal intramedullary fixation left femur partially observed. Abnormal appearance bilateral femoral necks concerning for fracture at either site. Question minimally displaced fractures right superior and inferior pubic rami. Significant overlying soft tissue obscures cortical detail. Consider CT evaluation. RECOMMENDATION: Careful clinical correlation and follow up recommended. XR KNEE LEFT (1-2 VIEWS)    Result Date: 2/21/2023  EXAMINATION: TWO XRAY VIEWS OF THE LEFT KNEE 2/21/2023 2:01 am COMPARISON: None. HISTORY: ORDERING SYSTEM PROVIDED HISTORY: pain TECHNOLOGIST PROVIDED HISTORY: Reason for exam:->pain What reading provider will be dictating this exam?->CRC FINDINGS: No fracture, dislocation or osseous lesion. Total knee arthroplasty intact.  Distal femoral intramedullary fixation intact. No effusion. Soft tissues unremarkable. No acute disease. RECOMMENDATION: Careful clinical correlation and follow up recommended. XR KNEE RIGHT (1-2 VIEWS)    Result Date: 2/21/2023  EXAMINATION: TWO XRAY VIEWS OF THE RIGHT KNEE 2/21/2023 5:59 am COMPARISON: None. HISTORY: ORDERING SYSTEM PROVIDED HISTORY: knee pain after fall TECHNOLOGIST PROVIDED HISTORY: Please include AP/lateral Reason for exam:->knee pain after fall What reading provider will be dictating this exam?->CRC FINDINGS: No fracture, dislocation or osseous lesion. Total knee arthroplasty. Distal femoral ORIF partially visualized. All hardware intact. No effusion. Soft tissues unremarkable. No acute disease. RECOMMENDATION: Careful clinical correlation and follow up recommended. CT HEAD WO CONTRAST    Result Date: 2/21/2023  EXAMINATION: CT OF THE HEAD WITHOUT CONTRAST  2/21/2023 3:23 am TECHNIQUE: CT of the head was performed without the administration of intravenous contrast. Automated exposure control, iterative reconstruction, and/or weight based adjustment of the mA/kV was utilized to reduce the radiation dose to as low as reasonably achievable. COMPARISON: None available. HISTORY: ORDERING SYSTEM PROVIDED HISTORY: questionable ICH TECHNOLOGIST PROVIDED HISTORY: Reason for exam:->questionable ICH Has a \"code stroke\" or \"stroke alert\" been called? ->No Decision Support Exception - unselect if not a suspected or confirmed emergency medical condition->Emergency Medical Condition (MA) What reading provider will be dictating this exam?->CRC FINDINGS: BRAIN/VENTRICLES: 18 mm hyperdensity within the right parietal deep white matter suggesting hemorrhage versus mass versus dystrophic calcification. Prior imaging not available on this database for direct comparison. No significant mass effect or surrounding edema. No abnormal extra-axial fluid collection.   The gray-white differentiation is maintained without evidence of an acute infarct. There is no evidence of hydrocephalus. ORBITS: The visualized portion of the orbits demonstrate no acute abnormality. SINUSES: The visualized paranasal sinuses and mastoid air cells demonstrate no acute abnormality. SOFT TISSUES/SKULL:  No acute abnormality of the visualized skull or soft tissues. 18 mm hyperdensity within the right parietal deep white matter suggesting hemorrhage versus mass. Prior imaging not available on this database for direct comparison. No significant mass effect or surrounding edema. RECOMMENDATIONS: Careful clinical correlation and follow up recommended. MRI evaluation recommended. XR CHEST PORTABLE    Result Date: 2/21/2023  EXAMINATION: ONE XRAY VIEW OF THE CHEST 2/21/2023 3:03 am COMPARISON: None. HISTORY: ORDERING SYSTEM PROVIDED HISTORY: Trauma fall TECHNOLOGIST PROVIDED HISTORY: Reason for exam:->trauma fall What reading provider will be dictating this exam?->CRC FINDINGS: Normal cardiomediastinal silhouette. Lungs clear. No pneumothorax or effusion. Osseous thorax intact. No acute disease. RECOMMENDATION: Careful clinical correlation and follow up recommended. CT FEMUR LEFT WO CONTRAST    Result Date: 2/21/2023  EXAMINATION: CT OF THE LEFT FEMUR WITHOUT CONTRAST 2/21/2023 7:01 am TECHNIQUE: CT of the left femur was performed without the administration of intravenous contrast.  Multiplanar reformatted images are provided for review. Automated exposure control, iterative reconstruction, and/or weight based adjustment of the mA/kV was utilized to reduce the radiation dose to as low as reasonably achievable.  COMPARISON: AP pelvis, left hip, left femur x-ray exams 02/21/2023 HISTORY ORDERING SYSTEM PROVIDED HISTORY: left distal femur pain after fall, TKA and retrograde nail in place TECHNOLOGIST PROVIDED HISTORY: Reason for exam:->left distal femur pain after fall, TKA and retrograde nail in place What reading provider will be dictating this exam?->CRC FINDINGS: No dislocation or acute fracture. Remote, healed fracture of the distal left femur transfixed with a long intramedullary nail and with proximal and distal locking screws. Left total knee arthroplasty and no complication visualized. Some unavoidable CT reconstruction artifact related to surgical hardware. The fixation hardware appears intact and without loosening. Intact proximal femur, acetabulum, and pubic rami on the left. Mild osteoarthritis of the left hip. The pubic symphysis and left SI joint are not widened. The right hemipelvis was not imaged. No soft tissue hematoma or suspicious fluid collections. No free pelvic fluid identified and the left pelvic sidewall appears intact. 1.  No dislocation or recent fracture. Remote, healed fracture of the distal left femur. No soft tissue hematoma or free pelvic fluid. 2.  Mild osteoarthritis, left femoroacetabular joint. RECOMMENDATIONS: Unavailable     XR HIP 2-3 VW W PELVIS LEFT    Result Date: 2/21/2023  EXAMINATION: ONE XRAY VIEW OF THE PELVIS AND TWO XRAY VIEWS LEFT HIP 2/21/2023 5:58 am COMPARISON: 2 hours prior. HISTORY: ORDERING SYSTEM PROVIDED HISTORY: left hip pain, fall TECHNOLOGIST PROVIDED HISTORY: Reason for exam:->left hip pain, fall What reading provider will be dictating this exam?->CRC FINDINGS: Osteopenia. No visible fracture. Right pubic ramus appears intact on the submitted images. ORIF hardware bilateral femurs partially visualized. Soft tissues unremarkable. No visible fracture. RECOMMENDATION: Careful clinical correlation and follow up recommended.        LABS:  CBC:   Lab Results   Component Value Date/Time    WBC 10.1 02/21/2023 12:04 AM    RBC 4.29 02/21/2023 12:04 AM    HGB 12.3 02/21/2023 12:04 AM    HCT 38.1 02/21/2023 12:04 AM    MCV 88.8 02/21/2023 12:04 AM    MCH 28.7 02/21/2023 12:04 AM    MCHC 32.3 02/21/2023 12:04 AM    RDW 13.2 02/21/2023 12:04 AM     02/21/2023 12:04 AM MPV 9.5 02/21/2023 12:04 AM     BMP:    Lab Results   Component Value Date/Time     02/21/2023 12:04 AM    K 5.0 02/21/2023 12:04 AM     02/21/2023 12:04 AM    CO2 23 02/21/2023 12:04 AM    BUN 21 02/21/2023 12:04 AM    LABALBU 3.0 02/21/2023 12:04 AM    CREATININE 0.9 02/21/2023 12:04 AM    CALCIUM 8.7 02/21/2023 12:04 AM    GFRAA >60 01/21/2019 12:00 PM    LABGLOM >60 02/21/2023 12:04 AM    GLUCOSE 105 02/21/2023 12:04 AM       IMPRESSION: Right parietal deep white matter hyperdensity likely calcified AVM    RECOMMENDATIONS: Needs brain MRI on an open machine with and without gadolinium      Thank you again for this consultation.       Bernabe Rodriguez MD  2/21/2023

## 2023-02-22 PROBLEM — I48.91 ATRIAL FIBRILLATION (HCC): Status: ACTIVE | Noted: 2023-02-22

## 2023-02-22 LAB
ABO/RH: NORMAL
ANION GAP SERPL CALCULATED.3IONS-SCNC: 8 MMOL/L (ref 7–16)
ANTIBODY SCREEN: NORMAL
BACTERIA: ABNORMAL /HPF
BASOPHILS ABSOLUTE: 0.04 E9/L (ref 0–0.2)
BASOPHILS RELATIVE PERCENT: 0.6 % (ref 0–2)
BILIRUBIN URINE: NEGATIVE
BLOOD, URINE: ABNORMAL
BUN BLDV-MCNC: 19 MG/DL (ref 6–23)
CALCIUM SERPL-MCNC: 8.2 MG/DL (ref 8.6–10.2)
CHLORIDE BLD-SCNC: 105 MMOL/L (ref 98–107)
CLARITY: ABNORMAL
CO2: 24 MMOL/L (ref 22–29)
COLOR: YELLOW
CREAT SERPL-MCNC: 0.9 MG/DL (ref 0.5–1)
EKG ATRIAL RATE: 326 BPM
EKG Q-T INTERVAL: 382 MS
EKG QRS DURATION: 74 MS
EKG QTC CALCULATION (BAZETT): 454 MS
EKG R AXIS: 27 DEGREES
EKG T AXIS: 25 DEGREES
EKG VENTRICULAR RATE: 85 BPM
EOSINOPHILS ABSOLUTE: 0.32 E9/L (ref 0.05–0.5)
EOSINOPHILS RELATIVE PERCENT: 5 % (ref 0–6)
GFR SERPL CREATININE-BSD FRML MDRD: >60 ML/MIN/1.73
GLUCOSE BLD-MCNC: 93 MG/DL (ref 74–99)
GLUCOSE URINE: NEGATIVE MG/DL
HBA1C MFR BLD: 5.6 % (ref 4–5.6)
HCT VFR BLD CALC: 34.3 % (ref 34–48)
HEMOGLOBIN: 11.1 G/DL (ref 11.5–15.5)
IMMATURE GRANULOCYTES #: 0.02 E9/L
IMMATURE GRANULOCYTES %: 0.3 % (ref 0–5)
KETONES, URINE: NEGATIVE MG/DL
LEUKOCYTE ESTERASE, URINE: ABNORMAL
LYMPHOCYTES ABSOLUTE: 1.77 E9/L (ref 1.5–4)
LYMPHOCYTES RELATIVE PERCENT: 27.4 % (ref 20–42)
MCH RBC QN AUTO: 28.9 PG (ref 26–35)
MCHC RBC AUTO-ENTMCNC: 32.4 % (ref 32–34.5)
MCV RBC AUTO: 89.3 FL (ref 80–99.9)
METER GLUCOSE: 88 MG/DL (ref 74–99)
METER GLUCOSE: 92 MG/DL (ref 74–99)
MONOCYTES ABSOLUTE: 0.59 E9/L (ref 0.1–0.95)
MONOCYTES RELATIVE PERCENT: 9.1 % (ref 2–12)
NEUTROPHILS ABSOLUTE: 3.72 E9/L (ref 1.8–7.3)
NEUTROPHILS RELATIVE PERCENT: 57.6 % (ref 43–80)
NITRITE, URINE: NEGATIVE
PDW BLD-RTO: 13 FL (ref 11.5–15)
PH UA: 7 (ref 5–9)
PLATELET # BLD: 136 E9/L (ref 130–450)
PMV BLD AUTO: 9.7 FL (ref 7–12)
POTASSIUM REFLEX MAGNESIUM: 4.5 MMOL/L (ref 3.5–5)
PROTEIN UA: ABNORMAL MG/DL
RBC # BLD: 3.84 E12/L (ref 3.5–5.5)
RBC UA: ABNORMAL /HPF (ref 0–2)
SODIUM BLD-SCNC: 137 MMOL/L (ref 132–146)
SPECIFIC GRAVITY UA: 1.02 (ref 1–1.03)
TROPONIN, HIGH SENSITIVITY: 25 NG/L (ref 0–9)
TROPONIN, HIGH SENSITIVITY: 26 NG/L (ref 0–9)
UROBILINOGEN, URINE: 0.2 E.U./DL
WBC # BLD: 6.5 E9/L (ref 4.5–11.5)
WBC UA: >20 /HPF (ref 0–5)

## 2023-02-22 PROCEDURE — 83036 HEMOGLOBIN GLYCOSYLATED A1C: CPT

## 2023-02-22 PROCEDURE — 99223 1ST HOSP IP/OBS HIGH 75: CPT | Performed by: INTERNAL MEDICINE

## 2023-02-22 PROCEDURE — 6370000000 HC RX 637 (ALT 250 FOR IP)

## 2023-02-22 PROCEDURE — 87088 URINE BACTERIA CULTURE: CPT

## 2023-02-22 PROCEDURE — 84484 ASSAY OF TROPONIN QUANT: CPT

## 2023-02-22 PROCEDURE — 93005 ELECTROCARDIOGRAM TRACING: CPT | Performed by: SURGERY

## 2023-02-22 PROCEDURE — 6370000000 HC RX 637 (ALT 250 FOR IP): Performed by: SURGERY

## 2023-02-22 PROCEDURE — 85025 COMPLETE CBC W/AUTO DIFF WBC: CPT

## 2023-02-22 PROCEDURE — 86900 BLOOD TYPING SEROLOGIC ABO: CPT

## 2023-02-22 PROCEDURE — 6360000002 HC RX W HCPCS

## 2023-02-22 PROCEDURE — 80048 BASIC METABOLIC PNL TOTAL CA: CPT

## 2023-02-22 PROCEDURE — 82962 GLUCOSE BLOOD TEST: CPT

## 2023-02-22 PROCEDURE — 99233 SBSQ HOSP IP/OBS HIGH 50: CPT | Performed by: SURGERY

## 2023-02-22 PROCEDURE — 86901 BLOOD TYPING SEROLOGIC RH(D): CPT

## 2023-02-22 PROCEDURE — 36415 COLL VENOUS BLD VENIPUNCTURE: CPT

## 2023-02-22 PROCEDURE — 81001 URINALYSIS AUTO W/SCOPE: CPT

## 2023-02-22 PROCEDURE — 93010 ELECTROCARDIOGRAM REPORT: CPT | Performed by: INTERNAL MEDICINE

## 2023-02-22 PROCEDURE — 87186 SC STD MICRODIL/AGAR DIL: CPT

## 2023-02-22 PROCEDURE — 86850 RBC ANTIBODY SCREEN: CPT

## 2023-02-22 PROCEDURE — 2140000000 HC CCU INTERMEDIATE R&B

## 2023-02-22 PROCEDURE — 2580000003 HC RX 258

## 2023-02-22 RX ORDER — LEVETIRACETAM 500 MG/1
500 TABLET ORAL 2 TIMES DAILY
Status: DISCONTINUED | OUTPATIENT
Start: 2023-02-22 | End: 2023-02-24

## 2023-02-22 RX ORDER — ZINC OXIDE 20 %
OINTMENT (GRAM) TOPICAL 4 TIMES DAILY PRN
Status: DISCONTINUED | OUTPATIENT
Start: 2023-02-22 | End: 2023-02-25 | Stop reason: HOSPADM

## 2023-02-22 RX ORDER — DIAZEPAM 5 MG/1
10 TABLET ORAL ONCE
Status: COMPLETED | OUTPATIENT
Start: 2023-02-22 | End: 2023-02-23

## 2023-02-22 RX ORDER — LORAZEPAM 2 MG/ML
0.5 INJECTION INTRAMUSCULAR ONCE
Status: DISCONTINUED | OUTPATIENT
Start: 2023-02-22 | End: 2023-02-22

## 2023-02-22 RX ORDER — NITROFURANTOIN 25; 75 MG/1; MG/1
100 CAPSULE ORAL EVERY 12 HOURS SCHEDULED
Status: DISCONTINUED | OUTPATIENT
Start: 2023-02-22 | End: 2023-02-25 | Stop reason: HOSPADM

## 2023-02-22 RX ORDER — CLOTRIMAZOLE 1 %
CREAM (GRAM) TOPICAL 2 TIMES DAILY
Status: DISCONTINUED | OUTPATIENT
Start: 2023-02-22 | End: 2023-02-25 | Stop reason: HOSPADM

## 2023-02-22 RX ADMIN — ACETAMINOPHEN 1000 MG: 500 TABLET, FILM COATED ORAL at 04:49

## 2023-02-22 RX ADMIN — SENNOSIDES AND DOCUSATE SODIUM 1 TABLET: 50; 8.6 TABLET ORAL at 09:54

## 2023-02-22 RX ADMIN — LEVETIRACETAM 500 MG: 5 INJECTION INTRAVENOUS at 04:16

## 2023-02-22 RX ADMIN — NITROFURANTOIN MONOHYDRATE/MACROCRYSTALLINE 100 MG: 25; 75 CAPSULE ORAL at 09:55

## 2023-02-22 RX ADMIN — METHOCARBAMOL 500 MG: 500 TABLET ORAL at 20:32

## 2023-02-22 RX ADMIN — LEVETIRACETAM 500 MG: 500 TABLET, FILM COATED ORAL at 20:31

## 2023-02-22 RX ADMIN — ACETAMINOPHEN 1000 MG: 500 TABLET, FILM COATED ORAL at 20:32

## 2023-02-22 RX ADMIN — NITROFURANTOIN MONOHYDRATE/MACROCRYSTALLINE 100 MG: 25; 75 CAPSULE ORAL at 20:31

## 2023-02-22 RX ADMIN — CLOTRIMAZOLE: 10 CREAM TOPICAL at 09:55

## 2023-02-22 RX ADMIN — METHOCARBAMOL 500 MG: 500 TABLET ORAL at 17:57

## 2023-02-22 RX ADMIN — ZINC OXIDE: 200 OINTMENT TOPICAL at 20:33

## 2023-02-22 RX ADMIN — METHOCARBAMOL 500 MG: 500 TABLET ORAL at 09:54

## 2023-02-22 RX ADMIN — ZINC OXIDE: 200 OINTMENT TOPICAL at 09:55

## 2023-02-22 RX ADMIN — SODIUM CHLORIDE, PRESERVATIVE FREE 10 ML: 5 INJECTION INTRAVENOUS at 09:55

## 2023-02-22 RX ADMIN — METHOCARBAMOL 500 MG: 500 TABLET ORAL at 13:39

## 2023-02-22 RX ADMIN — SODIUM CHLORIDE, PRESERVATIVE FREE 10 ML: 5 INJECTION INTRAVENOUS at 20:33

## 2023-02-22 RX ADMIN — SENNOSIDES AND DOCUSATE SODIUM 1 TABLET: 50; 8.6 TABLET ORAL at 20:32

## 2023-02-22 RX ADMIN — ACETAMINOPHEN 1000 MG: 500 TABLET, FILM COATED ORAL at 13:39

## 2023-02-22 RX ADMIN — CLOTRIMAZOLE: 10 CREAM TOPICAL at 20:33

## 2023-02-22 ASSESSMENT — ENCOUNTER SYMPTOMS
NAUSEA: 0
RESPIRATORY NEGATIVE: 1
GASTROINTESTINAL NEGATIVE: 1
BLOOD IN STOOL: 0
SHORTNESS OF BREATH: 0
ALLERGIC/IMMUNOLOGIC NEGATIVE: 1
BACK PAIN: 1
CONSTIPATION: 0
COUGH: 0
EYES NEGATIVE: 1
ABDOMINAL DISTENTION: 0
VOMITING: 0
ABDOMINAL PAIN: 0
ANAL BLEEDING: 0
DIARRHEA: 0

## 2023-02-22 ASSESSMENT — PAIN DESCRIPTION - DESCRIPTORS
DESCRIPTORS: ACHING;DISCOMFORT;SORE
DESCRIPTORS: ACHING
DESCRIPTORS: ACHING;DISCOMFORT;SORE
DESCRIPTORS: ACHING;DISCOMFORT

## 2023-02-22 ASSESSMENT — PAIN SCALES - GENERAL
PAINLEVEL_OUTOF10: 6
PAINLEVEL_OUTOF10: 0
PAINLEVEL_OUTOF10: 8
PAINLEVEL_OUTOF10: 5
PAINLEVEL_OUTOF10: 2
PAINLEVEL_OUTOF10: 6

## 2023-02-22 ASSESSMENT — PAIN DESCRIPTION - ORIENTATION
ORIENTATION: LEFT

## 2023-02-22 ASSESSMENT — PAIN DESCRIPTION - LOCATION
LOCATION: LEG
LOCATION: LEG
LOCATION: KNEE
LOCATION: KNEE

## 2023-02-22 ASSESSMENT — PAIN - FUNCTIONAL ASSESSMENT
PAIN_FUNCTIONAL_ASSESSMENT: PREVENTS OR INTERFERES SOME ACTIVE ACTIVITIES AND ADLS

## 2023-02-22 NOTE — PROGRESS NOTES
[facility]  PROGRESS NOTE  ATTENDING NOTE        TRAUMA  MECHANISM:  GLF    Chief Complaint   Patient presents with    Fall     Transfer from [place]. Pedro Mitchell at home, hit head, left parietal bleed       HPI  The patient is a 68 y/o female who sustained a mechanical fall yesterday. She did hit her head. She ambulates with a wheeled walker. She is morbidly obese. The patient reported  acute, constant  sharp pain localized to the head that started immediately. The intensity of the pain is 3/10. Pain does not radiate. There are no alleviating or worsening factors regarding the pain. The patient was transported by EMS to the [place] [address] Level 1 [facility] from [facility].   Evaluation prior to arrival included: CT head. Treatment prior to arrival included: none. A trauma consult was requested to assist, guide,  and expedite further evaluation and treatment for the patient. Patient Active Problem List   Diagnosis    Hypertension    Osteoarthritis    Anxiety and depression    Anemia    Type 2 diabetes mellitus without complication ([facility] [address]    Fall from ground level       SUBJECTIVE/OVERNIGHT EVENTS:  Cardiac pauses overnight; patient does not use CPAP or BIPAP    Review of Systems   Constitutional:  Positive for activity change. Negative for appetite change and unexpected weight change. HENT: Negative. Eyes: Negative. Respiratory: Negative. Negative for cough and shortness of breath. Cardiovascular: Negative. Negative for chest pain and leg swelling. Gastrointestinal: Negative. Negative for abdominal distention, abdominal pain, anal bleeding, blood in stool, constipation, diarrhea, nausea and vomiting. Endocrine: Negative. Genitourinary: Negative. Musculoskeletal:  Positive for back pain, gait problem, joint swelling and myalgias. Negative for arthralgias. Skin: Negative. Allergic/Immunologic: Negative.     Neurological:  Negative for dizziness, weakness and headaches. Hematological: Negative. Psychiatric/Behavioral: Negative. Negative for confusion, decreased concentration and sleep disturbance. BP (!) 168/72   Pulse 76   Temp 97 °F (36.1 °C) (Temporal)   Resp 18   Ht 5' 2\" (1.575 m)   Wt 234 lb (106.1 kg)   SpO2 96%   BMI 42.80 kg/m²   Physical Exam  Constitutional:       Appearance: Normal appearance. She is obese. HENT:      Head: Normocephalic and atraumatic. Nose: Nose normal.      Mouth/Throat:      Mouth: Mucous membranes are moist.      Pharynx: Oropharynx is clear. Eyes:      Extraocular Movements: Extraocular movements intact. Pupils: Pupils are equal, round, and reactive to light. Cardiovascular:      Rate and Rhythm: Normal rate and regular rhythm. Pulses: Normal pulses. Heart sounds: Normal heart sounds. Pulmonary:      Effort: Pulmonary effort is normal.      Breath sounds: Normal breath sounds. Abdominal:      General: There is no distension. Palpations: Abdomen is soft. Tenderness: There is no abdominal tenderness. Musculoskeletal:         General: No tenderness or signs of injury. Cervical back: Normal range of motion and neck supple. Skin:     General: Skin is warm and dry. Neurological:      General: No focal deficit present. Mental Status: She is alert and oriented to person, place, and time. Psychiatric:         Mood and Affect: Mood normal.         Behavior: Behavior normal.         Thought Content:  Thought content normal.         Judgment: Judgment normal.       ASSESSMENT/PLAN:   IPH vs. AVM--keppra x 7 days, NSGY following, Open MRI today  Gait instability--ambulated with wheeled walker, PT/OT  DM--ISS  Cardiac pauses--EP consult, EKG, echo  UTI--macrobid, f/u urine culture    INCIDENTAL FINDINGS:  none    AMPAC:  TBD/24    DVT/GI ppx:  bilateral Bernadette Meier MD, MSc, FACS  2/22/2023  1:01 PM

## 2023-02-22 NOTE — PROGRESS NOTES
MRI screening complete, questions answered by patient. Patient states issue with previous MRI due to claustrophobia - patient requesting premedication. Dr. Delfina Cope notified.

## 2023-02-22 NOTE — PROGRESS NOTES
Dr. Tessa Berumen notified regarding patient's c/o itching and pain in taya area. Also notified regarding UA results.

## 2023-02-22 NOTE — DISCHARGE INSTRUCTIONS
TRAUMA SERVICES DISCHARGE INSTRUCTIONS    Call [phone], option 2, for any questions/concerns and for follow-up appointment in 1 week(s). Please follow the instructions checked below:  Please follow-up with your primary care provider. ACTIVITY INSTRUCTIONS  Increase activity as tolerated  No heavy lifting or strenuous activity  Take your incentive spirometer home and use 4-6 times/day   [x]  No driving until cleared by Neurosurgery, Dr. Adin Carrasco    WOUND/DRESSING INSTRUCTIONS:  You may shower. No sitting in bath tub, hot tub or swimming until cleared by physician. Ice to areas of pain for first 24 hours. Heat to areas of pain after that. Wash areas of lacerations/abrasions with soap & water. Rinse well. Pat dry with clean towel. Apply thin layer of Bacitracin, Neosporin, or triple antibiotic cream to affected area 2-3 times per day. Keep wounds clean and dry. []  Sutures/Staples are to be removed in *** {DAY/WK/MON/YRS:20513}. MEDICATION INSTRUCTIONS  Take medication as prescribed. When taking pain medications, you may experience dizziness or drowsiness. Do not drink alcohol or drive when taking these medications. You may experience constipation while taking pain medication. You may take over the counter stool softeners such as docusate (Colace), sennosides S (Senokot-S), or Miralax.   []  You may take Ibuprofen (over the counter) as directed for mild pain. --You may take up to 800mg every 8 hours for pain, please take with food or milk.   []  You may take acetaminophen (Tylenol) products. Do NOT take more than 4000mg of Tylenol in 24h. OPIOID MEDICATION INSTRUCTIONS  Read the medication guide that is included with your prescription. Take your medication exactly as prescribed. Store medication away from children and in a safe place. Do NOT share your medication with others. Do NOT take medication unless it is prescribed for you.   Do NOT drink alcohol while taking opioids (I.e., Norco, Percocet, Oxycodone, etc). Discuss with the Trauma Clinic staff if the dose of medication you are taking does not control your pain and any side effects that you may be having. CALL 911 OR YOUR LOCAL EMERGENCY SERVICE:  --If you take too much medication  --If you have trouble breathing or shortness of breath  --A child has taken this medication. WORK:  You may not return to work until you receive follow-up with the Trauma Clinic or clearance by all consultants. Call the trauma clinic for any of the following or for questions/concerns;  --fever over 101F  --redness, swelling, hardness or warmth at the wound site(s). --Unrelieved nausea/vomiting  --Foul smelling or cloudy drainage at the wound site(s)  --Unrelieved pain or increase in pain  --Increase in shortness of breath    Follow-up:  Trauma Clinic: [phone] [place] [id number]    MILD TRAUMATIC BRAIN INJURY OR CONCUSSION  A mild traumatic brain injury (TBI) is one that causes some degree of injury to the brain causing symptoms ranging from a brief period of confusion to loss of consciousness (being knocked out). There is no major bruising or bleeding in the brain but symptoms can last from hours to months depending on the severity of the injury. Family or friends need to observe any change in behavior for the next 48 hours. Delayed effects from head injury do occur occasionally and can be due to slow bleeding or swelling around the surface of the brain. These effects may occur even if the x-rays/CT scans were normal.  Please observe the following symptoms during the next 24-48 hours. CALL 911 if:  Pupils (black part in the center of the eye) are unequal in size, and this is new. Seizure (convulsion).   Not responding to others/won't wake up or very hard to wake up  Faints (passes out)  Vomiting more than 3 times    Notify the TRAUMA CLINIC if any of the following symptoms occur:  Severe headache -- Mild headache may last for days. Report worsening pain or uncontrolled pain with prescribed medicine. Numbness, tingling or weakness -- Present in arms or legs; unsteady walking. Eye Changes/light sensation -- Vision problems; blurred or double-vision; unequal sized pupils. Nausea/Vomiting -- Episodes of vomiting may occur initially after a head injury. Persistent vomiting or difficulty taking medication by mouth. Increased Sleepiness -- Difficulty waking from sleep with increased confusion. Dizziness -- Does not go away or occurs repeatedly. Vomiting may accompany dizziness. Drainage -- Clear fluid or blood from the nose and ears. Fever -- Temperature over 101 degrees. Neck Pain. The First Four Weeks  The symptoms below are common after a mild brain injury. They usually get better on their own within a few weeks:   feeling tired or ?low  problems falling or staying asleep  feeling confused, poor concentration, or slow to answer questions  feeling dizzy, poor balance, or poor coordination  being sensitive to light  being sensitive to sounds  ringing in the ears  a mild headache, sometimes with nausea and/or vomiting  being irritable, having mood swings, or feeling somewhat sad or down  Contact the [address] [place] [address] Road if your symptoms are affecting your everyday activities. Remember that letting yourself get too tired can make your symptoms worse. Listen to your body: if doing a certain activity increases your symptoms, take a break from that activity. Build up the amount of time you do an activity and stay under the threshold of symptoms. Long term Effects (Post-Concussive Syndrome)    Notify physician if any of the following persists longer than 2-4 weeks.     Difficulty with concentration or attention (easily distracted)  Frequent headaches  Memory problems   Sensitivity to light   Sleeping difficulties      There is a higher risk of having a more serious head injury if:  Previous history of head injury or concussion  Taking medicine that thins your blood, or have a bleeding disorder  Have other neurologic (brain) problems  Have difficulty walking or frequent falls  Active in high impact contact sports, like soccer or football. Activities  Stay away from activities that could cause another head injury (like sports), until the doctor says it's okay. A second blow to the head can cause more damage to the brain  Limit reading, television, video games, etc. the first 48 hours. Your brain needs to rest so that it can recover. You may find that it helps to take time off school or work. Limit exposure to bright lights, loud noises, and crowds for the first 48 hours, as these can make your symptoms worse  Limit use of screens, such as an IPad, computer, cellphone, TV, etc, as these can make symptoms, especially headaches, worse. Work/School  It is recommended that you wait to return to work/school until after your follow-up appointment with trauma or your family doctor. If you are having no symptoms, please call for an earlier appointment  Some people find it hard to concentrate well so return to your normal activities slowly. Go back to work or school for half days at first, and increase as tolerated. Trauma services can help you with a graduated schedule. If you feel comfortable doing so, tell work or school about your concussion. You may have to adjust your activities, depending on your job or school demands. Rest and Sleep  Rest for the first 24 hours; it's one of the best things to help your brain recover. It's okay to sleep if you want  You don't have to be woken up every few hours. If someone does wake you up, you should awaken easily. Do some light physical activity (housework) or light exercise (walking, stationary bike) as soon as you can tolerate the movement.   Strenuous exercise (such as jogging or weight lifting) can make your concussion symptoms worse or last longer. Diet  Return to a normal diet as tolerated, you may want to start with liquids first  Eat healthy meals (including breakfast) and snacks throughout the day as your brain heals. Managing Pain  Tylenol or Ibuprofen are the best meds to take for headaches. Your doctor may have prescribed you medications if your headaches were severe or you have other injuries. Please take as directed. Driving  Your ability to concentrate and react quickly might be affected by the concussion. Please contact trauma services for advice on when to resume driving. Do not drive if you're concerned about vision problems, slowed thinking, slowed reaction time, reduced attention or poor judgment. Wear sunglasses even during winter if leyla while driving. The bright light may induce a headache. Alcohol use/Drug use  Don't drink alcohol or use recreational drugs as they may make you feel worse and/or hide the warning signs. Follow-up:  Trauma Clinic: [phone] -- press option 2   [address], [address] CONSIDERATIONS FOR OUR PATIENTS OVER THE AGE OF 65Y    Getting around your home safely can be a challenge if you have injuries or health problems that make it easy for you to fall. Loose rugs and furniture in walkways are among the dangers for many older people who have problems walking or who have poor eyesight. People who have conditions such as arthritis, osteoporosis, or dementia also must be careful not to fall. You can make your home safer with a few simple measures. Follow-up care is a key part of your treatment and safety. Be sure to make and go to all appointments, and call your doctor or nurse call line if you are having problems. It's also a good idea to know your test results and keep a list of the medicines you take. How can you care for yourself at home?   Taking care of yourself  You may get dizzy if you do not drink enough water. To prevent dehydration, drink plenty of fluids, enough so that your urine is light yellow or clear like water. Choose water and other caffeine-free clear liquids. If you have kidney, heart, or liver disease and have to limit fluids, talk with your doctor before you increase the amount of fluids you drink. Exercise regularly to improve your strength, muscle tone, and balance. Walk if you can. Swimming may be a good choice if you cannot walk easily. Have your vision and hearing checked each year or any time you notice a change. If you have trouble seeing and hearing, you might not be able to avoid objects and could lose your balance. Know the side effects of the medicines you take. Ask your doctor or pharmacist whether the medicines you take can affect your balance. Sleeping pills or sedatives can affect your balance. Limit the amount of alcohol you drink. Alcohol can impair your balance and other senses. Ask your doctor whether calluses or corns on your feet need to be removed. If you wear loose-fitting shoes because of calluses or corns, you can lose your balance and fall. Talk to your doctor if you have numbness in your feet. Preventing falls at home  Remove raised doorway thresholds, throw rugs, and clutter. Repair loose carpet or raised areas in the floor. Move furniture and electrical cords to keep them out of walking paths. Use non-skid floor wax, and wipe up spills right away, especially on ceramic tile floors. If you use a walker or cane, put rubber tips on it. If you use crutches, clean the bottoms of them regularly with an abrasive pad, such as steel wool. Keep your house well lit, especially stairways, porches, and outside walkways. Use night-lights in areas such as hallways and washrooms.  Add extra light switches or use remote switches (such as switches that go on or off when you clap your hands) to make it easier to turn lights on if you have to get up during the night. Install sturdy handrails on stairways. Move items in your cabinets so that the things you use a lot are on the lower shelves (about waist level). Keep a cordless phone and a flashlight with new batteries by your bed. If possible, put a phone in each of the main rooms of your house, or carry a cell phone in case you fall and cannot reach a phone. Or, you can wear a device around your neck or wrist. You push a button that sends a signal for help. Wear low-heeled shoes that fit well and give your feet good support. Use footwear with non-skid soles. Check the heels and soles of your shoes for wear. Repair or replace worn heels or soles. Do not wear socks without shoes on wood floors. Walk on the grass when the sidewalks are slippery. If you live in an area that gets snow and ice in the winter, sprinkle salt on slippery steps and sidewalks. Preventing falls in the bath  Install grab bars and non-skid mats inside and outside your shower or tub and near the toilet and sinks. Use shower chairs and bath benches. Use a hand-held shower head that will allow you to sit while showering. Get into a tub or shower by putting the weaker leg in first. Get out of a tub or shower with your strong side first.  Repair loose toilet seats and consider installing a raised toilet seat to make getting on and off the toilet easier. Keep your washroom door unlocked while you are in the shower. Follow-up:  Trauma Clinic: [phone] option 2  [place], [address]     Cardiac Electrophysiology discharge instructions: You have been seen by cardiac electrophysiology for new diagnosis of atrial fibrillation. You have been seen by Electrophysiologist: Dr. Stephanie Corrigan and Gaston Caputo, APRN - CNP    You were found to have atrial fibrillation with slow heart rates at times.   We recommended anticoagulation to reduce your risk of stroke and atrial fibrillation. You were started on Eliquis 5 mg twice daily and you should continue this medication. We also recommended 2-week heart monitor upon discharge from the hospital to assess rate and rhythm. We would like to see you in the office in about 4 to 6 weeks to discuss the findings of this monitor and discuss management of atrial fibrillation. You should follow-up appointment in about 4 weeks in the office on the second floor of the electrophysiology office on [facility]   [address] in [facility]. Please call [phone] for any changes in this appointment.        IMPORTANT PHONE NUMBERS:  [facility]:    - Phone # [phone] Nsaids Counseling: NSAID Counseling: I discussed with the patient that NSAIDs should be taken with food. Prolonged use of NSAIDs can result in the development of stomach ulcers.  Patient advised to stop taking NSAIDs if abdominal pain occurs.  The patient verbalized understanding of the proper use and possible adverse effects of NSAIDs.  All of the patient's questions and concerns were addressed.

## 2023-02-22 NOTE — PROGRESS NOTES
Occupational Therapy  OT SESSION ATTEMPT     Date:2023  Patient Name: Valdo Donahue  MRN: [id number]  : 1950  Room: [address] [place] [address]-A     Attempted OT session this date:    [x] unavailable due to other medical staff currently with pt   [] on hold per nursing staff   [] on hold per nursing staff secondary to lab / radiology results    [] declined treatment  this date due to ____. Benefits of participation in therapy reviewed with pt.    [] off unit   [] Other:     Will reattempt OT eval at a later time.     Thompson OTR/L #[id number]

## 2023-02-22 NOTE — CARE COORDINATION
RN informed that Timothy Hines did not  Pt at 3 pm. Now 4 pm. Delgado Latin  Dispatcher said there be her e in 20 minutes. Too late [address] [facility] closing. RN informed [facility] can take Pt tomorrow at  4. Rescheduled with Supervisor Viki Ahumada for 3 pm  Informed RN.    Sergei Garvey, L.S.W.  [phone]

## 2023-02-22 NOTE — PROGRESS NOTES
Neurosurg progress note  VITALS:  BP (!) 122/54   Pulse 64   Temp 97.3 °F (36.3 °C) (Temporal)   Resp 18   Ht 5' 2\" (1.575 m)   Wt 234 lb (106.1 kg)   SpO2 93%   BMI 42.80 kg/m²   24HR INTAKE/OUTPUT:    Intake/Output Summary (Last 24 hours) at 2/22/2023 0824  Last data filed at 2/22/2023 6993  Gross per 24 hour   Intake 290 ml   Output 400 ml   Net -110 ml     XR PELVIS (1-2 VIEWS)    Result Date: 2/21/2023  EXAMINATION: ONE XRAY VIEW OF THE PELVIS 2/21/2023 3:03 am COMPARISON: None. HISTORY: ORDERING SYSTEM PROVIDED HISTORY: Trauma fall TECHNOLOGIST PROVIDED HISTORY: Reason for exam:->trauma fall What reading provider will be dictating this exam?->CRC FINDINGS: Abnormal appearance bilateral femoral necks concerning for fracture at either site. Significant overlying soft tissue obscures cortical detail. Question minimally displaced fractures right superior and inferior pubic rami. Soft tissues unremarkable. Proximal intramedullary fixation left femur partially observed. Abnormal appearance bilateral femoral necks concerning for fracture at either site. Question minimally displaced fractures right superior and inferior pubic rami. Significant overlying soft tissue obscures cortical detail. Consider CT evaluation. RECOMMENDATION: Careful clinical correlation and follow up recommended. XR KNEE LEFT (1-2 VIEWS)    Result Date: 2/21/2023  EXAMINATION: TWO XRAY VIEWS OF THE LEFT KNEE 2/21/2023 2:01 am COMPARISON: None. HISTORY: ORDERING SYSTEM PROVIDED HISTORY: pain TECHNOLOGIST PROVIDED HISTORY: Reason for exam:->pain What reading provider will be dictating this exam?->CRC FINDINGS: No fracture, dislocation or osseous lesion. Total knee arthroplasty intact. Distal femoral intramedullary fixation intact. No effusion. Soft tissues unremarkable. No acute disease. RECOMMENDATION: Careful clinical correlation and follow up recommended.      XR KNEE RIGHT (1-2 VIEWS)    Result Date: 2/21/2023  EXAMINATION: TWO XRAY VIEWS OF THE RIGHT KNEE 2/21/2023 5:59 am COMPARISON: None. HISTORY: ORDERING SYSTEM PROVIDED HISTORY: knee pain after fall TECHNOLOGIST PROVIDED HISTORY: Please include AP/lateral Reason for exam:->knee pain after fall What reading provider will be dictating this exam?->CRC FINDINGS: No fracture, dislocation or osseous lesion. Total knee arthroplasty. Distal femoral ORIF partially visualized. All hardware intact. No effusion. Soft tissues unremarkable. No acute disease. RECOMMENDATION: Careful clinical correlation and follow up recommended. CT HEAD WO CONTRAST    Result Date: 2/21/2023  EXAMINATION: CT OF THE HEAD WITHOUT CONTRAST  2/21/2023 3:23 am TECHNIQUE: CT of the head was performed without the administration of intravenous contrast. Automated exposure control, iterative reconstruction, and/or weight based adjustment of the mA/kV was utilized to reduce the radiation dose to as low as reasonably achievable. COMPARISON: None available. HISTORY: ORDERING SYSTEM PROVIDED HISTORY: questionable ICH TECHNOLOGIST PROVIDED HISTORY: Reason for exam:->questionable ICH Has a \"code stroke\" or \"stroke alert\" been called? ->No Decision Support Exception - unselect if not a suspected or confirmed emergency medical condition->Emergency Medical Condition (MA) What reading provider will be dictating this exam?->CRC FINDINGS: BRAIN/VENTRICLES: 18 mm hyperdensity within the right parietal deep white matter suggesting hemorrhage versus mass versus dystrophic calcification. Prior imaging not available on this database for direct comparison. No significant mass effect or surrounding edema. No abnormal extra-axial fluid collection. The gray-white differentiation is maintained without evidence of an acute infarct. There is no evidence of hydrocephalus. ORBITS: The visualized portion of the orbits demonstrate no acute abnormality.  SINUSES: The visualized paranasal sinuses and mastoid air cells demonstrate no acute abnormality. SOFT TISSUES/SKULL:  No acute abnormality of the visualized skull or soft tissues. 18 mm hyperdensity within the right parietal deep white matter suggesting hemorrhage versus mass. Prior imaging not available on this database for direct comparison. No significant mass effect or surrounding edema. RECOMMENDATIONS: Careful clinical correlation and follow up recommended. MRI evaluation recommended. XR CHEST PORTABLE    Result Date: 2/21/2023  EXAMINATION: ONE XRAY VIEW OF THE CHEST 2/21/2023 3:03 am COMPARISON: None. HISTORY: ORDERING SYSTEM PROVIDED HISTORY: Trauma fall TECHNOLOGIST PROVIDED HISTORY: Reason for exam:->trauma fall What reading provider will be dictating this exam?->CRC FINDINGS: Normal cardiomediastinal silhouette. Lungs clear. No pneumothorax or effusion. Osseous thorax intact. No acute disease. RECOMMENDATION: Careful clinical correlation and follow up recommended. CT FEMUR LEFT WO CONTRAST    Result Date: 2/21/2023  EXAMINATION: CT OF THE LEFT FEMUR WITHOUT CONTRAST 2/21/2023 7:01 am TECHNIQUE: CT of the left femur was performed without the administration of intravenous contrast.  Multiplanar reformatted images are provided for review. Automated exposure control, iterative reconstruction, and/or weight based adjustment of the mA/kV was utilized to reduce the radiation dose to as low as reasonably achievable. COMPARISON: AP pelvis, left hip, left femur x-ray exams 02/21/2023 HISTORY ORDERING SYSTEM PROVIDED HISTORY: left distal femur pain after fall, TKA and retrograde nail in place TECHNOLOGIST PROVIDED HISTORY: Reason for exam:->left distal femur pain after fall, TKA and retrograde nail in place What reading provider will be dictating this exam?->CRC FINDINGS: No dislocation or acute fracture. Remote, healed fracture of the distal left femur transfixed with a long intramedullary nail and with proximal and distal locking screws.   Left total knee arthroplasty and no complication visualized. Some unavoidable CT reconstruction artifact related to surgical hardware. The fixation hardware appears intact and without loosening. Intact proximal femur, acetabulum, and pubic rami on the left. Mild osteoarthritis of the left hip. The pubic symphysis and left SI joint are not widened. The right hemipelvis was not imaged. No soft tissue hematoma or suspicious fluid collections. No free pelvic fluid identified and the left pelvic sidewall appears intact. 1.  No dislocation or recent fracture. Remote, healed fracture of the distal left femur. No soft tissue hematoma or free pelvic fluid. 2.  Mild osteoarthritis, left femoroacetabular joint. RECOMMENDATIONS: Unavailable     XR HIP 2-3 VW W PELVIS LEFT    Result Date: 2/21/2023  EXAMINATION: ONE XRAY VIEW OF THE PELVIS AND TWO XRAY VIEWS LEFT HIP 2/21/2023 5:58 am COMPARISON: 2 hours prior. HISTORY: ORDERING SYSTEM PROVIDED HISTORY: left hip pain, fall TECHNOLOGIST PROVIDED HISTORY: Reason for exam:->left hip pain, fall What reading provider will be dictating this exam?->CRC FINDINGS: Osteopenia. No visible fracture. Right pubic ramus appears intact on the submitted images. ORIF hardware bilateral femurs partially visualized. Soft tissues unremarkable. No visible fracture. RECOMMENDATION: Careful clinical correlation and follow up recommended.      CBC:   Lab Results   Component Value Date/Time    WBC 6.5 02/22/2023 05:59 AM    RBC 3.84 02/22/2023 05:59 AM    HGB 11.1 02/22/2023 05:59 AM    HCT 34.3 02/22/2023 05:59 AM    MCV 89.3 02/22/2023 05:59 AM    MCH 28.9 02/22/2023 05:59 AM    MCHC 32.4 02/22/2023 05:59 AM    RDW 13.0 02/22/2023 05:59 AM     02/22/2023 05:59 AM    MPV 9.7 02/22/2023 05:59 AM     BMP:    Lab Results   Component Value Date/Time     02/22/2023 05:59 AM    K 4.5 02/22/2023 05:59 AM     02/22/2023 05:59 AM    CO2 24 02/22/2023 05:59 AM    BUN 19 02/22/2023 05:59 AM    LABALBU 3.0 02/21/2023 12:04 AM    CREATININE 0.9 02/22/2023 05:59 AM    CALCIUM 8.2 02/22/2023 05:59 AM    GFRAA >60 01/21/2019 12:00 PM    LABGLOM >60 02/22/2023 05:59 AM    GLUCOSE 93 02/22/2023 05:59 AM      clotrimazole   Topical BID    nitrofurantoin (macrocrystal-monohydrate)  100 mg Oral 2 times per day    diazePAM  10 mg Oral Once    levETIRAcetam  500 mg IntraVENous Q12H    sodium chloride flush  5-40 mL IntraVENous 2 times per day    sennosides-docusate sodium  1 tablet Oral BID    acetaminophen  1,000 mg Oral 3 times per day    methocarbamol  500 mg Oral 4x Daily    insulin lispro  0-4 Units SubCUTAneous TID WC    insulin lispro  0-4 Units SubCUTAneous Nightly     Patient is awake and alert oriented friendly and cooperative denies the ability to ambulate, mostly due to multiple surgeries on both lower extremities, no focal neurologic deficits  Assessment:  Patient Active Problem List   Diagnosis    Hypertension    Osteoarthritis    Anxiety and depression    Anemia    Type 2 diabetes mellitus without complication ([facility]    Fall from ground level     Plan: Needs open MRI today we will try Valium by mouth before the exam discussed this with her answered all her questions   continue current care  Disha Brand MD M.D.

## 2023-02-22 NOTE — PROGRESS NOTES
[facility] did not come pick patient up for transport to open MRI, [facility] called the only time they have available tomorrow 2/23 is 4pm. Transport set up for patient to go to [facility] at 3pm on 2/23.

## 2023-02-22 NOTE — CARE COORDINATION
Spoke with Pt about Transition Plan of Care. Pt lives in [place] [address] in [place]. Pt uses a [employer]. PCP: Dr. Lida Mays. Pharmacy: [facility]. Pt unable to get in family's SUV's. Willing to pay Amb[facility] to get back to [place]. Discharge Plan is to return to [place] [address] when medically stable. /CM to follow for discharge needs. WILL Suarez.  [phone]     Case Management Assessment  Initial Evaluation    Date/Time of Evaluation: 2/22/2023 2:48 PM  Assessment Completed by: BINA Suarez    If patient is discharged prior to next notation, then this note serves as note for discharge by case management. Patient Name: Sania Berg                   YOB: 1950  Diagnosis: Injury of head, initial encounter [T38.96PR]  Fall from standing, initial encounter [W19. XXXA]  Fall from ground level Daphney Mighty  Right knee pain, unspecified chronicity [M25.561]                   Date / Time: 2/21/2023 12:21 AM    Patient Admission Status: Inpatient   Readmission Risk (Low < 19, Mod (19-27), High > 27): Readmission Risk Score: 12.1    Current PCP: No primary care provider on file. PCP verified by ? Yes    Chart Reviewed: Yes      History Provided by: Patient  Patient Orientation: Alert and Oriented    Patient Cognition: Alert    Hospitalization in the last 30 days (Readmission):  No    If yes, Readmission Assessment in  Navigator will be completed. Advance Directives:      Code Status: Full Code   Patient's Primary Decision Maker is:        Discharge Planning:    Patient lives with: Other (Comment) (assisted living) Type of Home: Assisted living  Primary Care Giver:  Other (Comment) (Facility Staff assist)  Patient Support Systems include: Family Members   Current Financial resources:    Current community resources:    Current services prior to admission: None            Current DME:              Type of Home Care services:  None    ADLS  Prior functional level: Assistance with the following:, Bathing, Dressing, Cooking, Housework, Shopping, Mobility  Current functional level: Assistance with the following:, Bathing, Dressing, Cooking, Housework, Shopping, Mobility    PT AM-PAC:   /24  OT AM-PAC:   /24    Family can provide assistance at DC: No (Pt can not get in family's SUV's)  Would you like Case Management to discuss the discharge plan with any other family members/significant others, and if so, who? No  Plans to Return to Present Housing: Yes  Other Identified Issues/Barriers to RETURNING to current housing:   Potential Assistance needed at discharge: N/A            Potential DME:    Patient expects to discharge to: Assisted living  Plan for transportation at discharge:      Financial    Payor: [employer] Alert / Plan: MEDICARE PART A AND B / Product Type: *No Product type* /     Does insurance require precert for SNF: No    Potential assistance Purchasing Medications:    Meds-to-Beds Renetta Washington      [facility] [address], [place] - [address] [facility] [phone] Vasquez Rodriguez  [facility] [address] [facility] [address]  Phone: [phone] Fax: [address] [place] [address], [address] [place] [address]  [address]  [address] [facility] [address]  Phone: [phone] Fax: [phone]      Notes:    Factors facilitating achievement of predicted outcomes: Family support, Motivated, Cooperative, and Pleasant    Barriers to discharge: Lower extremity weakness    Additional Case Management Notes: The Plan for Transition of Care is related to the following treatment goals of Injury of head, initial encounter [S09.90XA]  Fall from standing, initial encounter [W19. XXXA]  Fall from ground level [W18.30XA]  Right knee pain, unspecified chronicity [C57.681]    IF APPLICABLE: The Patient and/or patient representative Andie Santiago and her family were provided with a choice of provider and agrees with the discharge plan.  Freedom of choice list with basic dialogue that supports the patient's individualized plan of care/goals and shares the quality data associated with the providers was provided to:     Patient Representative Name:       The Patient and/or Patient Representative Agree with the Discharge Plan?       Rony Byrnes [facility]  Case Management Department  Ph: [phone] Fax: [phone]

## 2023-02-22 NOTE — CARE COORDINATION
Called [facility] to schedule MRI. Gave Information and fax number. Only appointment is 4 pm.   Gave faxed forms for RN to complete. Scheduled  [facility] for pickup at 3 pm. Notified RN of  time. Completed Ambulance form.     CARMITA CoeSJOSE JUAN.  [phone]

## 2023-02-22 NOTE — CONSULTS
[address] [facility] Electrophysiology  Consultation Report  PATIENT: [address]Scottie Lafleur RECORD NUMBER: [id number]  DATE OF SERVICE:  2/22/2023  ATTENDING ELECTROPHYSIOLOGIST: Khushbu Ospina MD  PRIMARY ELECTROPHYSIOLOGIST: Khushbu Ospina MD  REFERRING PHYSICIAN: No ref. provider found and No primary care provider on file. CHIEF COMPLAINT: Murl Rung down    HPI: This is a 67 y.o. female with a history of   Patient Active Problem List   Diagnosis    Hypertension    Osteoarthritis    Anxiety and depression    Anemia    Type 2 diabetes mellitus without complication ([facility]    Fall from ground level   who presented to the hospital on 2/21/23 complaining of tripped and fell down. The patient has history of hypertension, diabetes mellitus, obesity, anxiety and depression. The patient presented to the hospital after tripped her slipper and fell down hit the head on the door. She states that she had chronic knee pains but hurt more after fall. She went to [facility] and was found to have possible right parietal intracranial hemorrhage so she was transferred to [facility]. She was seen by Neurosurgery and CT head was thought to be calcified AVM. She is scheduled for brain MRI today. Her initial EKG showed AF with HR 85 bpm. She denies any previous cardiac history. While she is on telemetry showed was noted to have HR of 50-60 bpm with 2.36 second pause at 12.43 PM of 2/22/23. She reports history of vertigo. The patient denies any chest pain, dyspnea, palpitations, syncope, orthopnea or paroxysmal nocturnal dyspnea. Cardiac electrophysiology service is consulted for cardiac pauses.     Patient Active Problem List    Diagnosis Date Noted    Fall from ground level 02/21/2023     Priority: Medium    Type 2 diabetes mellitus without complication ([facility] 13/05/0304    Hypertension 07/14/2014    Osteoarthritis 07/14/2014    Anxiety and depression 07/14/2014    Anemia 07/14/2014 Past Medical History:   Diagnosis Date    Anemia 2000    Anxiety     Depression     Hypertension     Osteoarthritis        Family History   Problem Relation Age of Onset    Diabetes Mother     Dementia Mother     Diabetes Sister     Diabetes Son        Social History     Tobacco Use    Smoking status: Passive Smoke Exposure - Never Smoker    Smokeless tobacco: Never   Substance Use Topics    Alcohol use: No       Current Facility-Administered Medications   Medication Dose Route Frequency Provider Last Rate Last Admin    zinc oxide 20 % ointment   Topical 4x Daily PRN Patrick Bend, DO   Given at 02/22/23 0955    clotrimazole (LOTRIMIN) 1 % cream   Topical BID Patrick Camacho, DO   Given at 02/22/23 4578    nitrofurantoin (macrocrystal-monohydrate) (MACROBID) capsule 100 mg  100 mg Oral 2 times per day [facility] Bend, DO   100 mg at 02/22/23 7955    diazePAM (VALIUM) tablet 10 mg  10 mg Oral Once May White MD        levETIRAcetam (KEPPRA) tablet 500 mg  500 mg Oral BID Kimo Medina MD        perflutren lipid microspheres (DEFINITY) injection 1.5 mL  1.5 mL IntraVENous ONCE PRN MARSHAL Harris - DANIEL        hydrALAZINE (APRESOLINE) injection 10 mg  10 mg IntraVENous Q1H PRN Patrick Camacho, DO        labetalol (NORMODYNE;TRANDATE) injection 10 mg  10 mg IntraVENous Q30 Min PRN Dixon Wuffin, DO        sodium chloride flush 0.9 % injection 5-40 mL  5-40 mL IntraVENous 2 times per day Patrick Camacho, DO   10 mL at 02/22/23 0955    sodium chloride flush 0.9 % injection 5-40 mL  5-40 mL IntraVENous PRN Dixon Wuffin, DO        0.9 % sodium chloride infusion   IntraVENous PRN Dixon Straffin, DO        ondansetron (ZOFRAN-ODT) disintegrating tablet 4 mg  4 mg Oral Q8H PRN Davider Bend, DO        Or    ondansetron (ZOFRAN) injection 4 mg  4 mg IntraVENous Q6H PRN Dixon Wuffin, DO        sennosides-docusate sodium (SENOKOT-S) 8.6-50 MG tablet 1 tablet  1 tablet Oral BID Patrick Camacho, DO   1 tablet at 02/22/23 0954    polyethylene glycol (GLYCOLAX) packet 17 g  17 g Oral Daily PRN Merlynn Gus, DO        acetaminophen (TYLENOL) tablet 1,000 mg  1,000 mg Oral 3 times per day Merlynn Gus, DO   1,000 mg at 02/22/23 1339    oxyCODONE (ROXICODONE) immediate release tablet 5 mg  5 mg Oral Q4H PRN Merlynn Gus, DO        Or    oxyCODONE HCl (OXY-IR) immediate release tablet 10 mg  10 mg Oral Q4H PRN Merlynn Gus, DO   10 mg at 02/21/23 1559    methocarbamol (ROBAXIN) tablet 500 mg  500 mg Oral 4x Daily Merlynn Gus, DO   500 mg at 02/22/23 1339    glucose chewable tablet 16 g  4 tablet Oral PRN Dixon Straffin, DO        dextrose bolus 10% 125 mL  125 mL IntraVENous PRN Merlynn Gus, DO        Or    dextrose bolus 10% 250 mL  250 mL IntraVENous PRN Dixon Straffin, DO        glucagon injection 1 mg  1 mg SubCUTAneous PRN Dixon Straffin, DO        dextrose 10 % infusion   IntraVENous Continuous PRN Dixon Straffin, DO        insulin lispro (HUMALOG) injection vial 0-4 Units  0-4 Units SubCUTAneous TID WC Dixon Straffin, DO        insulin lispro (HUMALOG) injection vial 0-4 Units  0-4 Units SubCUTAneous Nightly Dixon Straffin, DO            Allergies   Allergen Reactions    Ambien [Zolpidem Tartrate]      Sleep walking    Bactrim [Sulfamethoxazole-Trimethoprim] Rash     ROS:   Constitutional: Negative for fever. Positive for activity change and negative for appetite change. HENT: Negative for epistaxis. Eyes: Negative for diploplia, blurred vision. Respiratory: Negative for cough, chest tightness, shortness of breath and wheezing. Cardiovascular: pertinent positives in HPI  Gastrointestinal: Negative for abdominal pain and blood in stool.    All other review of systems are negative     PHYSICAL EXAM:   Vitals:    02/21/23 2256 02/22/23 0323 02/22/23 0815 02/22/23 1145   BP: 131/74 (!) 110/56 (!) 122/54 (!) 168/72   Pulse: 53 59 64 76   Resp: 16 16 18 18   Temp: 97.4 °F (36.3 °C) 97.4 °F (36.3 °C) 97.3 °F (36.3 °C) 97 °F (36.1 °C)   TempSrc: Temporal Temporal Temporal Temporal   SpO2: 96% 94% 93% 96%   Weight:       Height:          Constitutional: Well-developed, no acute distress  Eyes: conjunctivae normal, no xanthelasma   Ears, Nose, Throat: oral mucosa moist, no cyanosis   CV: no JVD. Irregular rate and rhythm. No murmur, rubs, or gallops. PMI is nondisplaced  Lungs: clear to auscultation bilaterally, normal respiratory effort without used of accessory muscles  Abdomen: soft, non-tender, bowel sounds present, no masses or hepatomegaly   Musculoskeletal: no digital clubbing, no edema   Skin: warm, no rashes     I have personally reviewed the laboratory, cardiac diagnostic and radiographic testing as outlined below:    Data:    Recent Labs     02/21/23  0004 02/22/23  0559   WBC 10.1 6.5   HGB 12.3 11.1*   HCT 38.1 34.3    136     Recent Labs     02/21/23  0004 02/22/23  0559    137   K 5.0 4.5    105   CO2 23 24   BUN 21 19   CREATININE 0.9 0.9   CALCIUM 8.7 8.2*      No results found for: MG  No results for input(s): TSH in the last 72 hours. No results for input(s): INR in the last 72 hours. CXR 2/21/23:   FINDINGS:   Normal cardiomediastinal silhouette. Lungs clear. No pneumothorax or   effusion. Osseous thorax intact. Impression   No acute disease. RECOMMENDATION:   Careful clinical correlation and follow up recommended. Telemetry 02/22/23 : AF 50-76 bpm, maximum pause was 2.36 seconds. EKG 2/21/23: AF 85 bpm, low voltage QRS, old anteroseptal MI, Qtc 454 ms. Please see scan in Cardiology. I have independently reviewed all of the ECGs and rhythm strips per above     Assessment/Plan:  This is a 67 y.o. female with a history of   Patient Active Problem List   Diagnosis    Hypertension    Osteoarthritis    Anxiety and depression    Anemia    Type 2 diabetes mellitus without complication ([facility]    Fall from ground level    who presents with atrial fibrillation. 1. Newly diagnosed atrial fibrillation  - Diagnosed 2/21/23 on EKG. - Likely persistent AF versus long standing persistent AF.  - Asymptomatic.  - [id number] of 4  - Not on any AV node blocker agent or OAC. - Presents in AF with CVR. 2. Hypertension  - On Zestril. 3. Diabetes mellitus    4. Obesity  Body mass index is 42.8 kg/m². 5. Anxiety and depression  - On Pamelor, Risperdal and Restoril. 6. Mechanical fall    7. Anemia  - On Ferrous sulfate. 8. Probable BRE    Recommendations:  Start oral anticoagulation, Eliquis 5 mg BID, if brain MRI shows no evidence of intracranial bleeding and if OK with Neurosurgery and surgery. No plan for rhythm control treatment. No indication for pacing therapy. Sleep study to exclude BRE as an OPD basis. I have spent a total of 80 minutes with the patient and the family reviewing the above stated recommendations. And a total of >50% of that time involved face-to-face time providing counseling and or coordination of care with the other providers, reviewing records/tests, counseling/education of the patient, ordering medications/tests/procedures, coordinating care, and documenting clinical information in the EHR. Thank you for allowing me to participate in your patient's care. Please call me if there are any questions or concerns.       Mathew Gregory MD  Cardiac Electrophysiology  [address]   [facility] [place]: [place] Electrophysiology  2:39 PM  2/22/2023

## 2023-02-23 ENCOUNTER — APPOINTMENT (OUTPATIENT)
Dept: MRI IMAGING | Age: 73
End: 2023-02-23
Payer: MEDICARE

## 2023-02-23 LAB
ANION GAP SERPL CALCULATED.3IONS-SCNC: 9 MMOL/L (ref 7–16)
BASOPHILS ABSOLUTE: 0.03 E9/L (ref 0–0.2)
BASOPHILS RELATIVE PERCENT: 0.5 % (ref 0–2)
BUN BLDV-MCNC: 19 MG/DL (ref 6–23)
CALCIUM SERPL-MCNC: 8.2 MG/DL (ref 8.6–10.2)
CHLORIDE BLD-SCNC: 102 MMOL/L (ref 98–107)
CO2: 25 MMOL/L (ref 22–29)
CREAT SERPL-MCNC: 0.9 MG/DL (ref 0.5–1)
EKG ATRIAL RATE: 58 BPM
EKG Q-T INTERVAL: 410 MS
EKG QRS DURATION: 74 MS
EKG QTC CALCULATION (BAZETT): 395 MS
EKG R AXIS: 31 DEGREES
EKG T AXIS: 19 DEGREES
EKG VENTRICULAR RATE: 56 BPM
EOSINOPHILS ABSOLUTE: 0.33 E9/L (ref 0.05–0.5)
EOSINOPHILS RELATIVE PERCENT: 5.2 % (ref 0–6)
GFR SERPL CREATININE-BSD FRML MDRD: >60 ML/MIN/1.73
GLUCOSE BLD-MCNC: 102 MG/DL (ref 74–99)
HCT VFR BLD CALC: 35.4 % (ref 34–48)
HEMOGLOBIN: 11.5 G/DL (ref 11.5–15.5)
IMMATURE GRANULOCYTES #: 0.03 E9/L
IMMATURE GRANULOCYTES %: 0.5 % (ref 0–5)
LV EF: 65 %
LVEF MODALITY: NORMAL
LYMPHOCYTES ABSOLUTE: 1.74 E9/L (ref 1.5–4)
LYMPHOCYTES RELATIVE PERCENT: 27.3 % (ref 20–42)
MCH RBC QN AUTO: 28.6 PG (ref 26–35)
MCHC RBC AUTO-ENTMCNC: 32.5 % (ref 32–34.5)
MCV RBC AUTO: 88.1 FL (ref 80–99.9)
MONOCYTES ABSOLUTE: 0.58 E9/L (ref 0.1–0.95)
MONOCYTES RELATIVE PERCENT: 9.1 % (ref 2–12)
NEUTROPHILS ABSOLUTE: 3.67 E9/L (ref 1.8–7.3)
NEUTROPHILS RELATIVE PERCENT: 57.4 % (ref 43–80)
PDW BLD-RTO: 13 FL (ref 11.5–15)
PLATELET # BLD: 139 E9/L (ref 130–450)
PMV BLD AUTO: 9.9 FL (ref 7–12)
POTASSIUM SERPL-SCNC: 4.1 MMOL/L (ref 3.5–5)
RBC # BLD: 4.02 E12/L (ref 3.5–5.5)
SODIUM BLD-SCNC: 136 MMOL/L (ref 132–146)
WBC # BLD: 6.4 E9/L (ref 4.5–11.5)

## 2023-02-23 PROCEDURE — 6370000000 HC RX 637 (ALT 250 FOR IP)

## 2023-02-23 PROCEDURE — 97535 SELF CARE MNGMENT TRAINING: CPT

## 2023-02-23 PROCEDURE — 36415 COLL VENOUS BLD VENIPUNCTURE: CPT

## 2023-02-23 PROCEDURE — 97161 PT EVAL LOW COMPLEX 20 MIN: CPT

## 2023-02-23 PROCEDURE — 6360000004 HC RX CONTRAST MEDICATION: Performed by: NURSE PRACTITIONER

## 2023-02-23 PROCEDURE — 85025 COMPLETE CBC W/AUTO DIFF WBC: CPT

## 2023-02-23 PROCEDURE — 6370000000 HC RX 637 (ALT 250 FOR IP): Performed by: SURGERY

## 2023-02-23 PROCEDURE — 2140000000 HC CCU INTERMEDIATE R&B

## 2023-02-23 PROCEDURE — 93306 TTE W/DOPPLER COMPLETE: CPT

## 2023-02-23 PROCEDURE — 6360000004 HC RX CONTRAST MEDICATION: Performed by: RADIOLOGY

## 2023-02-23 PROCEDURE — 93010 ELECTROCARDIOGRAM REPORT: CPT | Performed by: INTERNAL MEDICINE

## 2023-02-23 PROCEDURE — 97530 THERAPEUTIC ACTIVITIES: CPT

## 2023-02-23 PROCEDURE — 2580000003 HC RX 258

## 2023-02-23 PROCEDURE — 80048 BASIC METABOLIC PNL TOTAL CA: CPT

## 2023-02-23 PROCEDURE — 97165 OT EVAL LOW COMPLEX 30 MIN: CPT

## 2023-02-23 PROCEDURE — 99232 SBSQ HOSP IP/OBS MODERATE 35: CPT | Performed by: SURGERY

## 2023-02-23 PROCEDURE — A9579 GAD-BASE MR CONTRAST NOS,1ML: HCPCS | Performed by: RADIOLOGY

## 2023-02-23 PROCEDURE — 99233 SBSQ HOSP IP/OBS HIGH 50: CPT | Performed by: INTERNAL MEDICINE

## 2023-02-23 PROCEDURE — 6370000000 HC RX 637 (ALT 250 FOR IP): Performed by: NEUROLOGICAL SURGERY

## 2023-02-23 PROCEDURE — 70553 MRI BRAIN STEM W/O & W/DYE: CPT

## 2023-02-23 RX ADMIN — SODIUM CHLORIDE, PRESERVATIVE FREE 10 ML: 5 INJECTION INTRAVENOUS at 21:00

## 2023-02-23 RX ADMIN — SODIUM CHLORIDE, PRESERVATIVE FREE 10 ML: 5 INJECTION INTRAVENOUS at 07:53

## 2023-02-23 RX ADMIN — METHOCARBAMOL 500 MG: 500 TABLET ORAL at 21:00

## 2023-02-23 RX ADMIN — CLOTRIMAZOLE: 10 CREAM TOPICAL at 07:55

## 2023-02-23 RX ADMIN — METHOCARBAMOL 500 MG: 500 TABLET ORAL at 17:03

## 2023-02-23 RX ADMIN — OXYCODONE HYDROCHLORIDE 5 MG: 5 TABLET ORAL at 14:05

## 2023-02-23 RX ADMIN — PERFLUTREN 1.5 ML: 6.52 INJECTION, SUSPENSION INTRAVENOUS at 14:57

## 2023-02-23 RX ADMIN — SENNOSIDES AND DOCUSATE SODIUM 1 TABLET: 50; 8.6 TABLET ORAL at 07:53

## 2023-02-23 RX ADMIN — OXYCODONE HYDROCHLORIDE 5 MG: 5 TABLET ORAL at 00:13

## 2023-02-23 RX ADMIN — METHOCARBAMOL 500 MG: 500 TABLET ORAL at 07:53

## 2023-02-23 RX ADMIN — OXYCODONE HYDROCHLORIDE 10 MG: 10 TABLET ORAL at 09:46

## 2023-02-23 RX ADMIN — ACETAMINOPHEN 1000 MG: 500 TABLET, FILM COATED ORAL at 21:00

## 2023-02-23 RX ADMIN — NITROFURANTOIN MONOHYDRATE/MACROCRYSTALLINE 100 MG: 25; 75 CAPSULE ORAL at 21:01

## 2023-02-23 RX ADMIN — LEVETIRACETAM 500 MG: 500 TABLET, FILM COATED ORAL at 21:00

## 2023-02-23 RX ADMIN — OXYCODONE HYDROCHLORIDE 10 MG: 10 TABLET ORAL at 21:08

## 2023-02-23 RX ADMIN — NITROFURANTOIN MONOHYDRATE/MACROCRYSTALLINE 100 MG: 25; 75 CAPSULE ORAL at 07:53

## 2023-02-23 RX ADMIN — SENNOSIDES AND DOCUSATE SODIUM 1 TABLET: 50; 8.6 TABLET ORAL at 21:01

## 2023-02-23 RX ADMIN — GADOTERIDOL 15 ML: 279.3 INJECTION, SOLUTION INTRAVENOUS at 16:58

## 2023-02-23 RX ADMIN — DIAZEPAM 10 MG: 5 TABLET ORAL at 15:11

## 2023-02-23 RX ADMIN — LEVETIRACETAM 500 MG: 500 TABLET, FILM COATED ORAL at 07:53

## 2023-02-23 RX ADMIN — ACETAMINOPHEN 1000 MG: 500 TABLET, FILM COATED ORAL at 05:53

## 2023-02-23 RX ADMIN — METHOCARBAMOL 500 MG: 500 TABLET ORAL at 14:05

## 2023-02-23 RX ADMIN — ACETAMINOPHEN 1000 MG: 500 TABLET, FILM COATED ORAL at 14:05

## 2023-02-23 ASSESSMENT — PAIN DESCRIPTION - PAIN TYPE
TYPE: ACUTE PAIN

## 2023-02-23 ASSESSMENT — PAIN DESCRIPTION - LOCATION
LOCATION: LEG
LOCATION: HIP
LOCATION: KNEE;LEG
LOCATION: LEG
LOCATION: HIP
LOCATION: HIP

## 2023-02-23 ASSESSMENT — ENCOUNTER SYMPTOMS
BLOOD IN STOOL: 0
NAUSEA: 0
COUGH: 0
RESPIRATORY NEGATIVE: 1
VOMITING: 0
ALLERGIC/IMMUNOLOGIC NEGATIVE: 1
EYES NEGATIVE: 1
CONSTIPATION: 0
ANAL BLEEDING: 0
ABDOMINAL DISTENTION: 0
DIARRHEA: 0
SHORTNESS OF BREATH: 0
BACK PAIN: 1
ABDOMINAL PAIN: 0
GASTROINTESTINAL NEGATIVE: 1

## 2023-02-23 ASSESSMENT — PAIN - FUNCTIONAL ASSESSMENT
PAIN_FUNCTIONAL_ASSESSMENT: PREVENTS OR INTERFERES SOME ACTIVE ACTIVITIES AND ADLS
PAIN_FUNCTIONAL_ASSESSMENT: ACTIVITIES ARE NOT PREVENTED
PAIN_FUNCTIONAL_ASSESSMENT: ACTIVITIES ARE NOT PREVENTED
PAIN_FUNCTIONAL_ASSESSMENT: PREVENTS OR INTERFERES SOME ACTIVE ACTIVITIES AND ADLS
PAIN_FUNCTIONAL_ASSESSMENT: PREVENTS OR INTERFERES SOME ACTIVE ACTIVITIES AND ADLS

## 2023-02-23 ASSESSMENT — PAIN DESCRIPTION - DESCRIPTORS
DESCRIPTORS: TENDER;ACHING;DISCOMFORT
DESCRIPTORS: ACHING;DISCOMFORT;DULL
DESCRIPTORS: ACHING;DISCOMFORT;SORE

## 2023-02-23 ASSESSMENT — PAIN DESCRIPTION - FREQUENCY
FREQUENCY: INTERMITTENT
FREQUENCY: CONTINUOUS

## 2023-02-23 ASSESSMENT — PAIN SCALES - GENERAL
PAINLEVEL_OUTOF10: 6
PAINLEVEL_OUTOF10: 0
PAINLEVEL_OUTOF10: 6
PAINLEVEL_OUTOF10: 6
PAINLEVEL_OUTOF10: 7
PAINLEVEL_OUTOF10: 3
PAINLEVEL_OUTOF10: 5
PAINLEVEL_OUTOF10: 0

## 2023-02-23 ASSESSMENT — PAIN DESCRIPTION - ONSET
ONSET: ON-GOING

## 2023-02-23 ASSESSMENT — PAIN DESCRIPTION - ORIENTATION
ORIENTATION: RIGHT;LEFT
ORIENTATION: RIGHT;LEFT
ORIENTATION: LEFT
ORIENTATION: RIGHT;LEFT
ORIENTATION: LEFT
ORIENTATION: LEFT

## 2023-02-23 NOTE — PROGRESS NOTES
Neurosurg progress note  VITALS:  /82   Pulse (!) 46   Temp (!) 96.5 °F (35.8 °C) (Temporal)   Resp 20   Ht 5' 2\" (1.575 m)   Wt 234 lb (106.1 kg)   SpO2 98%   BMI 42.80 kg/m²   24HR INTAKE/OUTPUT:    Intake/Output Summary (Last 24 hours) at 2/23/2023 0851  Last data filed at 2/23/2023 3276  Gross per 24 hour   Intake 550 ml   Output 100 ml   Net 450 ml     XR PELVIS (1-2 VIEWS)    Result Date: 2/21/2023  EXAMINATION: ONE XRAY VIEW OF THE PELVIS 2/21/2023 3:03 am COMPARISON: None. HISTORY: ORDERING SYSTEM PROVIDED HISTORY: Trauma fall TECHNOLOGIST PROVIDED HISTORY: Reason for exam:->trauma fall What reading provider will be dictating this exam?->CRC FINDINGS: Abnormal appearance bilateral femoral necks concerning for fracture at either site. Significant overlying soft tissue obscures cortical detail. Question minimally displaced fractures right superior and inferior pubic rami. Soft tissues unremarkable. Proximal intramedullary fixation left femur partially observed. Abnormal appearance bilateral femoral necks concerning for fracture at either site. Question minimally displaced fractures right superior and inferior pubic rami. Significant overlying soft tissue obscures cortical detail. Consider CT evaluation. RECOMMENDATION: Careful clinical correlation and follow up recommended. XR KNEE LEFT (1-2 VIEWS)    Result Date: 2/21/2023  EXAMINATION: TWO XRAY VIEWS OF THE LEFT KNEE 2/21/2023 2:01 am COMPARISON: None. HISTORY: ORDERING SYSTEM PROVIDED HISTORY: pain TECHNOLOGIST PROVIDED HISTORY: Reason for exam:->pain What reading provider will be dictating this exam?->CRC FINDINGS: No fracture, dislocation or osseous lesion. Total knee arthroplasty intact. Distal femoral intramedullary fixation intact. No effusion. Soft tissues unremarkable. No acute disease. RECOMMENDATION: Careful clinical correlation and follow up recommended.      XR KNEE RIGHT (1-2 VIEWS)    Result Date: 2/21/2023  EXAMINATION: TWO XRAY VIEWS OF THE RIGHT KNEE 2/21/2023 5:59 am COMPARISON: None. HISTORY: ORDERING SYSTEM PROVIDED HISTORY: knee pain after fall TECHNOLOGIST PROVIDED HISTORY: Please include AP/lateral Reason for exam:->knee pain after fall What reading provider will be dictating this exam?->CRC FINDINGS: No fracture, dislocation or osseous lesion. Total knee arthroplasty. Distal femoral ORIF partially visualized. All hardware intact. No effusion. Soft tissues unremarkable. No acute disease. RECOMMENDATION: Careful clinical correlation and follow up recommended. CT HEAD WO CONTRAST    Result Date: 2/21/2023  EXAMINATION: CT OF THE HEAD WITHOUT CONTRAST  2/21/2023 3:23 am TECHNIQUE: CT of the head was performed without the administration of intravenous contrast. Automated exposure control, iterative reconstruction, and/or weight based adjustment of the mA/kV was utilized to reduce the radiation dose to as low as reasonably achievable. COMPARISON: None available. HISTORY: ORDERING SYSTEM PROVIDED HISTORY: questionable ICH TECHNOLOGIST PROVIDED HISTORY: Reason for exam:->questionable ICH Has a \"code stroke\" or \"stroke alert\" been called? ->No Decision Support Exception - unselect if not a suspected or confirmed emergency medical condition->Emergency Medical Condition (MA) What reading provider will be dictating this exam?->CRC FINDINGS: BRAIN/VENTRICLES: 18 mm hyperdensity within the right parietal deep white matter suggesting hemorrhage versus mass versus dystrophic calcification. Prior imaging not available on this database for direct comparison. No significant mass effect or surrounding edema. No abnormal extra-axial fluid collection. The gray-white differentiation is maintained without evidence of an acute infarct. There is no evidence of hydrocephalus. ORBITS: The visualized portion of the orbits demonstrate no acute abnormality.  SINUSES: The visualized paranasal sinuses and mastoid air cells demonstrate no acute abnormality. SOFT TISSUES/SKULL:  No acute abnormality of the visualized skull or soft tissues. 18 mm hyperdensity within the right parietal deep white matter suggesting hemorrhage versus mass. Prior imaging not available on this database for direct comparison. No significant mass effect or surrounding edema. RECOMMENDATIONS: Careful clinical correlation and follow up recommended. MRI evaluation recommended. XR CHEST PORTABLE    Result Date: 2/21/2023  EXAMINATION: ONE XRAY VIEW OF THE CHEST 2/21/2023 3:03 am COMPARISON: None. HISTORY: ORDERING SYSTEM PROVIDED HISTORY: Trauma fall TECHNOLOGIST PROVIDED HISTORY: Reason for exam:->trauma fall What reading provider will be dictating this exam?->CRC FINDINGS: Normal cardiomediastinal silhouette. Lungs clear. No pneumothorax or effusion. Osseous thorax intact. No acute disease. RECOMMENDATION: Careful clinical correlation and follow up recommended. CT FEMUR LEFT WO CONTRAST    Result Date: 2/21/2023  EXAMINATION: CT OF THE LEFT FEMUR WITHOUT CONTRAST 2/21/2023 7:01 am TECHNIQUE: CT of the left femur was performed without the administration of intravenous contrast.  Multiplanar reformatted images are provided for review. Automated exposure control, iterative reconstruction, and/or weight based adjustment of the mA/kV was utilized to reduce the radiation dose to as low as reasonably achievable. COMPARISON: AP pelvis, left hip, left femur x-ray exams 02/21/2023 HISTORY ORDERING SYSTEM PROVIDED HISTORY: left distal femur pain after fall, TKA and retrograde nail in place TECHNOLOGIST PROVIDED HISTORY: Reason for exam:->left distal femur pain after fall, TKA and retrograde nail in place What reading provider will be dictating this exam?->CRC FINDINGS: No dislocation or acute fracture. Remote, healed fracture of the distal left femur transfixed with a long intramedullary nail and with proximal and distal locking screws.   Left total knee arthroplasty and no complication visualized. Some unavoidable CT reconstruction artifact related to surgical hardware. The fixation hardware appears intact and without loosening. Intact proximal femur, acetabulum, and pubic rami on the left. Mild osteoarthritis of the left hip. The pubic symphysis and left SI joint are not widened. The right hemipelvis was not imaged. No soft tissue hematoma or suspicious fluid collections. No free pelvic fluid identified and the left pelvic sidewall appears intact. 1.  No dislocation or recent fracture. Remote, healed fracture of the distal left femur. No soft tissue hematoma or free pelvic fluid. 2.  Mild osteoarthritis, left femoroacetabular joint. RECOMMENDATIONS: Unavailable     XR HIP 2-3 VW W PELVIS LEFT    Result Date: 2/21/2023  EXAMINATION: ONE XRAY VIEW OF THE PELVIS AND TWO XRAY VIEWS LEFT HIP 2/21/2023 5:58 am COMPARISON: 2 hours prior. HISTORY: ORDERING SYSTEM PROVIDED HISTORY: left hip pain, fall TECHNOLOGIST PROVIDED HISTORY: Reason for exam:->left hip pain, fall What reading provider will be dictating this exam?->CRC FINDINGS: Osteopenia. No visible fracture. Right pubic ramus appears intact on the submitted images. ORIF hardware bilateral femurs partially visualized. Soft tissues unremarkable. No visible fracture. RECOMMENDATION: Careful clinical correlation and follow up recommended.      CBC:   Lab Results   Component Value Date/Time    WBC 6.4 02/23/2023 04:19 AM    RBC 4.02 02/23/2023 04:19 AM    HGB 11.5 02/23/2023 04:19 AM    HCT 35.4 02/23/2023 04:19 AM    MCV 88.1 02/23/2023 04:19 AM    MCH 28.6 02/23/2023 04:19 AM    MCHC 32.5 02/23/2023 04:19 AM    RDW 13.0 02/23/2023 04:19 AM     02/23/2023 04:19 AM    MPV 9.9 02/23/2023 04:19 AM     BMP:    Lab Results   Component Value Date/Time     02/23/2023 04:19 AM    K 4.1 02/23/2023 04:19 AM    K 4.5 02/22/2023 05:59 AM     02/23/2023 04:19 AM    CO2 25 02/23/2023 04:19 AM    BUN 19 02/23/2023 04:19 AM    LABALBU 3.0 02/21/2023 12:04 AM    CREATININE 0.9 02/23/2023 04:19 AM    CALCIUM 8.2 02/23/2023 04:19 AM    GFRAA >60 01/21/2019 12:00 PM    LABGLOM >60 02/23/2023 04:19 AM    GLUCOSE 102 02/23/2023 04:19 AM      clotrimazole   Topical BID    nitrofurantoin (macrocrystal-monohydrate)  100 mg Oral 2 times per day    diazePAM  10 mg Oral Once    levETIRAcetam  500 mg Oral BID    sodium chloride flush  5-40 mL IntraVENous 2 times per day    sennosides-docusate sodium  1 tablet Oral BID    acetaminophen  1,000 mg Oral 3 times per day    methocarbamol  500 mg Oral 4x Daily     Remains awake and alert oriented friendly cooperative opens eyes spontaneously follows commands no focal neurologic deficits  Assessment:  Patient Active Problem List   Diagnosis    Hypertension    Osteoarthritis    Anxiety and depression    Anemia    Type 2 diabetes mellitus without complication ([facility] [address]    Fall from ground level    Atrial fibrillation ([facility] [address]     Plan: Await MRI of the brain on an open machine continue current care  Ernie Toribio MD M.D.

## 2023-02-23 NOTE — PROGRESS NOTES
Occupational Therapy  OCCUPATIONAL THERAPY INITIAL EVALUATION    [address]  [address] [place] [address]      [id number]                                                Patient Name: Margarito Yeager  MRN: [id number]  : 1950  Room: [address] [place] [address]    Evaluating OT: Vinay Tomas OTR/L #[id number]     Referring Provider: Anselmo Mchugh DO  Specific Provider Orders/Date: OT eval and treat 23    Diagnosis: Injury of head, initial encounter [S09.90XA]  Fall from standing, initial encounter [W19. XXXA]  Fall from ground level [W18.30XA]  Right knee pain, unspecified chronicity [M25.561]   Pt admitted to hospital following fall. + right parietal bleed    Pertinent Medical History:  has a past medical history of Anemia, Anxiety, Depression, Hypertension, and Osteoarthritis.        Precautions:  Fall Risk, WBAT L LE, TAPS    Assessment of current deficits    [x] Functional mobility  [x]ADLs  [x] Strength               []Cognition    [x] Functional transfers   [x] IADLs         [x] Safety Awareness   [x]Endurance    [] Fine Coordination              [x] Balance      [] Vision/perception   []Sensation     []Gross Motor Coordination  [] ROM  [] Delirium                   [] Motor Control     OT PLAN OF CARE   OT POC based on physician orders, patient diagnosis and results of clinical assessment    Frequency/Duration 1-3 days/wk for 2 weeks PRN   Specific OT Treatment Interventions to include:   * Instruction/training on adapted ADL techniques and AE recommendations to increase functional independence within precautions       * Training on energy conservation strategies, correct breathing pattern and techniques to improve independence/tolerance for self-care routine  * Functional transfer/mobility training/DME recommendations for increased independence, safety, and fall prevention  * Patient/Family education to increase follow through with safety techniques and functional independence  * Recommendation of environmental modifications for increased safety with functional transfers/mobility and ADLs  * Therapeutic exercise to improve motor endurance, ROM, and functional strength for ADLs/functional transfers  * Therapeutic activities to facilitate/challenge dynamic balance, stand tolerance for increased safety and independence with ADLs  * Therapeutic activities to facilitate gross/fine motor skills for increased independence with ADLs  * Neuro-muscular re-education: facilitation of righting/equilibrium reactions, midline orientation, scapular stability/mobility, normalization of muscle tone, and facilitation of volitional active controled movement      Modified Virgie Scale (MRS)  Score     Description  0             No symptoms  1             No significant disability despite symptoms  2             Slight disability; able to look after own affairs  3             Moderate disability; able to ambulate without assist/ requires assist with ADLs  4             Moderate/Severe disability;requires assist to ambulate/assist with ADLs  5             Severe disability;bedridden/incontinent   6               Score:   5    Recommended Adaptive Equipment:  TBD     Home Living: Pt resides at nursing facility    Bathroom setup: handicap accessible    Equipment owned: w/w, scooter    Prior Level of Function: assist prn with ADLs , assist with IADLs; ambulated with w/w vs scooter utilized for mobility    Driving: no   Occupation: na    Pain Level: Pt reports 5-8/10 L LE pain; Therapist facilitated repositioning to address pain      Cognition: A&O: x3 (self, place, month./year); Follows 1 step directions   Memory:  good   Sequencing:  good   Problem solving:  fair   Judgement/safety:  fair     Functional Assessment:  AM-PAC Daily Activity Raw Score:    Initial Eval Status  Date: 23 Treatment Status  Date: STGs = LTGs  Time frame: 10-14 days   Feeding Independent     Grooming Minimal Assist   Modified [place]    UB Dressing Minimal Assist   Modified [place]    LB Dressing Dependent   Maximal Assist    Bathing Maximal Assist  Moderate Assist    Toileting Dependent   Moderate Assist    Bed Mobility  Supine to sit: Maximal Assist  Sit to supine: Maximal Assist   Supine to sit: Minimal Assist   Sit to supine: Minimal Assist    Functional Transfers Maximal Assist x2  Moderate Assist   Functional Mobility NT    Unable to complete side steps  Maximal Assist    Balance Sitting:     Static:  Min A    Dynamic:min A  Standing: max A x2 at w/w     Activity Tolerance F-    Limited due to pain  F+   Visual/  Perceptual Glasses: yes  wfl                    Hand Dominance right   Strength ROM Additional Info:    RUE   3-/5 proximal  3+/5 distal   Shoulder 1/2 ROM  Elbow wfl good  and wfl FMC/dexterity noted during ADL tasks     LUE 4-/5 wfl good  and wfl FMC/dexterity noted during ADL tasks     Hearing: wfl  Sensation:wfl  Tone: wfl  Edema:none noted     Comments: Upon arrival patient supine in bed and agreeable to OT Session. Therapist educated pt on role of OT. At end of session, patient semi-supine in bed with call light and phone within reach, all lines and tubes intact. Overall patient demonstrated decreased independence and safety during completion of ADL/functional transfer/mobility tasks. Pt would benefit from continued skilled OT to increase safety and independence with completion of ADL/IADL tasks for functional independence and quality of life.     Treatment: OT treatment provided this date includes: Facilitation of bed mobility, unsupported sitting balance at EOB (impacting ADLs; addressing posture, weight shifting, dynamic reaching), functional sit to stand transfers , standing tolerance tasks at w/w (addressing posture, balance and activity tolerance ; limited due to L LE pain), lateral scooting and skilled repositioning in bed addressing joint and skin integrity  - skilled cuing on sequencing, hand placement, posture, body mechanics, energy conservation techniques and safety. Therapist facilitated self-care retraining: UB/LB self-care tasks (gown, socks, partial bathing task), toileting hygiene task and seated grooming tasks while educating pt on modified techniques, posture, safety and energy conservation techniques. Pt limited this session due to c/o L LE. Skilled monitoring of HR, O2 sats and pts response to treatment. Rehab Potential: Good for established goals     Patient / Family Goal: regain independence      Patient and/or family were instructed on functional diagnosis, prognosis/goals and OT plan of care. Demonstrated fair understanding. Eval Complexity: Low    Time In: 820  Time Out: 900  Total Treatment Time: 25 minutes    Min Units   OT Eval Low 97165  x  1   OT Eval Medium 54794      OT Eval High 67726      OT Re-Eval [id number]       Therapeutic Ex 57955       Therapeutic Activities 51393  11  1   ADL/Self Care 15160  14  1   Orthotic Management 15083       Manual 22393     Neuro Re-Ed 41581       Non-Billable Time          Evaluation Time additionally includes thorough review of current medical information, gathering information on past medical history/social history and prior level of function, interpretation of standardized testing/informal observation of tasks, assessment of data and development of plan of care and goals.           Rosanna Monsivais OTR/L #[id number]

## 2023-02-23 NOTE — PLAN OF CARE
Problem: Chronic Conditions and Co-morbidities  Goal: Patient's chronic conditions and co-morbidity symptoms are monitored and maintained or improved  Outcome: Progressing  Flowsheets (Taken 2/22/2023 2000 by Hilary Vargas, RN)  Care Plan - Patient's Chronic Conditions and Co-Morbidity Symptoms are Monitored and Maintained or Improved: Monitor and assess patient's chronic conditions and comorbid symptoms for stability, deterioration, or improvement     Problem: Pain  Goal: Verbalizes/displays adequate comfort level or baseline comfort level  2/23/2023 0115 by Ayala Cruz RN  Outcome: Progressing  2/22/2023 1803 by Jacquelyn Duncan RN  Outcome: Progressing     Problem: Safety - Adult  Goal: Free from fall injury  2/23/2023 0115 by Ayala Cruz RN  Outcome: Melanieserinabridget Aguilera (Taken 2/22/2023 2100 by Hilary Vargas, RN)  Free From Fall Injury: Instruct family/caregiver on patient safety  2/22/2023 1803 by Jacquelyn Duncan RN  Outcome: Progressing

## 2023-02-23 NOTE — PROGRESS NOTES
Physical Therapy    Initial Assessment     Name: Alberto Snow  : 1950  MRN: [id number]      Date of Service: 2023    Evaluating PT: Nata Martínez PT, DPT [id number]      Room #:  7887/4821-G  Diagnosis:  Injury of head, initial encounter [S09.90XA]  Fall from standing, initial encounter [W19. XXXA]  Fall from ground level [W18.30XA]  Right knee pain, unspecified chronicity [M25.561]  PMHx/PSHx:   has a past medical history of Anemia, Anxiety, Depression, Hypertension, and Osteoarthritis. Precautions:  Fall Risk, WBAT L LE (\"physical therapy with short gait training approximately 6 to 12 feet and can progress as tolerated\"), SOURAV Simmons    SUBJECTIVE:    Pt lives at a nursing facility. Pt ambulated with [address] prior to admission. Pt used scooter as well. OBJECTIVE:   Initial Evaluation  Date: 23 Treatment Date: Short Term/ Long Term   Goals   AM-PAC 6 Clicks      Was pt agreeable to Eval/treatment? Yes     Does pt have pain? 5-8/10 L LE pain     Bed Mobility  Rolling: Mod A  Supine to sit: Max A  Sit to supine: Max A  Scooting: Max A to EOB  Rolling: SBA  Supine to sit: SBA  Sit to supine: SBA  Scooting: SBA   Transfers Sit to stand: Max A x2  Stand to sit: Max A x2  Stand pivot: NT  Sit to stand: Min A  Stand to sit: Min A  Stand pivot: Mod A with [address]   Ambulation   NT  >10 feet with [address] with Mod A   Stair negotiation: ascended and descended NT  NA   ROM BUE: Refer to OT note  BLE: WFL     Strength BUE: Refer to OT note  BLE: WFL     Balance Sitting EOB: SBA  Dynamic Standing: NT  Sitting EOB: Supervision  Dynamic Standing: Min A with [address]     Pt is A & O x: 3 to person, place, and month/year. Sensation: Pt denies numbness and tingling of extremities. Edema: Unremarkable. Patient education  Pt educated on hand placement during transfers.     Patient response to education:   Pt verbalized understanding Pt demonstrated skill Pt requires further education in this area   Yes With cues Yes ASSESSMENT:    Conditions Requiring Skilled Therapeutic Intervention:    [x]Decreased strength     []Decreased ROM  [x]Decreased functional mobility  [x]Decreased balance   [x]Decreased endurance   []Decreased posture  []Decreased sensation  []Decreased coordination   []Decreased vision  [x]Decreased safety awareness   [x]Increased pain       Comments:    Pt was in bed upon room entry; agreeable to PT evaluation. Pt is primarily limited by L LE pain. Pt required heavy assist for transfer to EOB. Sitting balance was Fair and pt sat at EOB for 15+ minutes. Pt completed x2 transfers from EOB with assist x2. Pt was able to stand for about 30 seconds on first stand and 10 seconds on second stand. Pt was unable to take sidesteps. Pt was assisted back to bed following all activity. All questions and concerns were addressed. Pt was left in bed with all needs met at conclusion of session. Treatment:  Patient practiced and was instructed in the following treatment:    Therapeutic activities:  Bed mobility: Pt was cued for technique during bed mobility transfers. Transfers: Pt was cued for hand placement during sit <> stand transfers. Pt completed x2 transfers from EOB. Sitting EOB: Pt sat at EOB for 15+ minutes (sitting balance, upright tolerance). Vitals and symptoms were closely monitored throughout session. Skillful positioning in bed to protect skin/joint integrity. Pt's/family goals:  1. To return to PLOF. Prognosis is Fair for reaching above PT goals. Patient and or family understand(s) diagnosis, prognosis, and plan of care. Yes.     PHYSICAL THERAPY PLAN OF CARE:    PT POC is established based on physician order and patient diagnosis     Referring provider/PT Order:    Start   Ordering Provider    02/21/23 0430  PT evaluation and treat  Start:  02/21/23 0430,   End:  02/21/23 0430,   ONE TIME,   Standing Count:  1 Occurrences,   R         Dixon Castanon DO      Diagnosis:  Injury of head, initial encounter [M61.89DL]  Fall from standing, initial encounter [W19. XXXA]  Fall from ground level [W18.30XA]  Right knee pain, unspecified chronicity [M25.561]  Specific instructions for next treatment:  Progress activity. Current Treatment Recommendations:     [x] Strengthening to improve independence with functional mobility   [] ROM to improve independence with functional mobility   [x] Balance Training to improve static/dynamic balance and to reduce fall risk  [x] Endurance Training to improve activity tolerance during functional mobility   [x] Transfer Training to improve safety and independence with all functional transfers   [x] Gait Training to improve gait mechanics, endurance and assess need for appropriate assistive device  [] Stair Training in preparation for safe discharge home and/or into the community   [x] Positioning to prevent skin breakdown and contractures  [x] Safety and Education Training   [] Patient/Caregiver Education   [] HEP  [] Other     PT long term treatment goals are located in above grid    Frequency of treatments: 2-5x/week x 1-2 weeks. Time in  0821  Time out  0900    Total Treatment Time  23 minutes     Evaluation Time includes thorough review of current medical information, gathering information on past medical history/social history and prior level of function, completion of standardized testing/informal observation of tasks, assessment of data and education on plan of care and goals.     CPT codes:  [x] Low Complexity PT evaluation 22183  [] Moderate Complexity PT evaluation 86648  [] High Complexity PT evaluation 32686  [] PT Re-evaluation 24908  [] Gait training 35285 0 minutes  [] Manual therapy 26888 0 minutes  [x] Therapeutic activities 44982 23 minutes  [] Therapeutic exercises 75656 0 minutes  [] Neuromuscular reeducation 34760 0 minutes     Opal Pena, PT, DPT  [id number]

## 2023-02-23 NOTE — CARE COORDINATION
Ac Eppss  here to  Pt for [facility]  Open MRI. Discharge Plan is   to return home when medically stable. SW/CM to follow for discharge needs. New A-Fib Dx. Will need outpatient sleep study after discharge. SW/CM to follow ofr discharge needs.    SAMSON [email or web address].[email or web address].  [phone]

## 2023-02-23 NOTE — PROGRESS NOTES
[facility] SURGICAL ASSOCIATES  PROGRESS NOTE  ATTENDING NOTE        TRAUMA  MECHANISM:  GLF    Chief Complaint   Patient presents with    Fall     Transfer from [place]. Marvel Hemp at home, hit head, left parietal bleed       HPI  The patient is a 68 y/o female who sustained a mechanical fall yesterday. She did hit her head. She ambulates with a wheeled walker. She is morbidly obese. The patient reported  acute, constant  sharp pain localized to the head that started immediately. The intensity of the pain is 3/10. Pain does not radiate. There are no alleviating or worsening factors regarding the pain. The patient was transported by EMS to the [place] [address] [address] 1 [facility] from [facility].   Evaluation prior to arrival included: CT head. Treatment prior to arrival included: none. A trauma consult was requested to assist, guide,  and expedite further evaluation and treatment for the patient. Patient Active Problem List   Diagnosis    Hypertension    Osteoarthritis    Anxiety and depression    Anemia    Type 2 diabetes mellitus without complication ([address]    Fall from ground level    Atrial fibrillation ([facility] [address]       SUBJECTIVE/OVERNIGHT EVENTS:  No issues did not go to open MRI due to transportation issues    Review of Systems   Constitutional:  Positive for activity change. Negative for appetite change and unexpected weight change. HENT: Negative. Eyes: Negative. Respiratory: Negative. Negative for cough and shortness of breath. Cardiovascular: Negative. Negative for chest pain and leg swelling. Gastrointestinal: Negative. Negative for abdominal distention, abdominal pain, anal bleeding, blood in stool, constipation, diarrhea, nausea and vomiting. Endocrine: Negative. Genitourinary: Negative. Musculoskeletal:  Positive for back pain, gait problem, joint swelling and myalgias. Negative for arthralgias. Skin: Negative. Allergic/Immunologic: Negative. Neurological:  Negative for dizziness, weakness and headaches. Hematological: Negative. Psychiatric/Behavioral: Negative. Negative for confusion, decreased concentration and sleep disturbance. BP 97/61   Pulse (!) 48   Temp 97.8 °F (36.6 °C) (Temporal)   Resp 16   Ht 5' 2\" (1.575 m)   Wt 234 lb (106.1 kg)   SpO2 96%   BMI 42.80 kg/m²   Physical Exam  Constitutional:       Appearance: Normal appearance. She is obese. HENT:      Head: Normocephalic and atraumatic. Nose: Nose normal.      Mouth/Throat:      Mouth: Mucous membranes are moist.      Pharynx: Oropharynx is clear. Eyes:      Extraocular Movements: Extraocular movements intact. Pupils: Pupils are equal, round, and reactive to light. Cardiovascular:      Rate and Rhythm: Normal rate and regular rhythm. Pulses: Normal pulses. Heart sounds: Normal heart sounds. Pulmonary:      Effort: Pulmonary effort is normal.      Breath sounds: Normal breath sounds. Abdominal:      General: There is no distension. Palpations: Abdomen is soft. Tenderness: There is no abdominal tenderness. Musculoskeletal:         General: No tenderness or signs of injury. Cervical back: Normal range of motion and neck supple. Skin:     General: Skin is warm and dry. Neurological:      General: No focal deficit present. Mental Status: She is alert and oriented to person, place, and time. Psychiatric:         Mood and Affect: Mood normal.         Behavior: Behavior normal.         Thought Content:  Thought content normal.         Judgment: Judgment normal.       ASSESSMENT/PLAN:   IPH vs. AVM--keppra x 7 days, NSGY following, Open MRI today  Gait instability--ambulated with wheeled walker, PT/OT  DM--ISS  Cardiac pauses--EP consult, EKG, echo, Zio patch on discharge, want patient on Eliquis if okay with neurosurgery on discharge  UTI--macrobid, f/u urine culture  New onset atrial fibrillation--Eliquis on discharge if okay with neurosurgery    INCIDENTAL FINDINGS:  none    AMPA:  9/24    DVT/GI ppx:  bilateral Robert Zafar MD, MSc, FACS  2/23/2023  3:03 PM

## 2023-02-23 NOTE — PROGRESS NOTES
[address] [facility],[address] and [address] [facility] Electrophysiology  Inpatient progress note   PATIENT: Christiano Padron  MEDICAL RECORD NUMBER: [id number]  DATE OF SERVICE:  2/23/2023  ATTENDING ELECTROPHYSIOLOGIST: Mc Monae MD  PRIMARY ELECTROPHYSIOLOGIST: Mc Monae MD  REFERRING PHYSICIAN:   CHIEF COMPLAINT: Art Bennetts down    HPI: This is a 67 y.o. female with a history of   Patient Active Problem List   Diagnosis    Hypertension    Osteoarthritis    Anxiety and depression    Anemia    Type 2 diabetes mellitus without complication ([facility] [address]    Fall from ground level    Atrial fibrillation ([facility] [address]   who presented to the hospital on 2/21/23 complaining of tripped and fell down. The patient has history of hypertension, diabetes mellitus, obesity, anxiety and depression. The patient presented to the hospital after tripped her slipper and fell down hit the head on the door. She states that she had chronic knee pains but hurt more after fall. She went to [facility] and was found to have possible right parietal intracranial hemorrhage so she was transferred to [facility]. She was seen by Neurosurgery and CT head was thought to be calcified AVM. She is scheduled for brain MRI today. Her initial EKG showed AF with HR 85 bpm. She denies any previous cardiac history. While she is on telemetry showed was noted to have HR of 50-60 bpm with 2.36 second pause at 12.43 PM of 2/22/23. She reports history of vertigo. The patient denies any chest pain, dyspnea, palpitations, syncope, orthopnea or paroxysmal nocturnal dyspnea. Cardiac electrophysiology service is consulted for cardiac pauses. 2/23/2023: Patient lying in bed in no acute distress. She did not have MRI yesterday and is waiting for her MRI to be done today. She continues in atrial fibrillation with slow ventricular rate but no significant pauses on the monitor. She denies any lightheaded or dizziness laying in bed.   She did have some lightheadedness getting up out of bed but this resolved with rest.    Patient Active Problem List    Diagnosis Date Noted    Atrial fibrillation ([facility] 02/22/2023     Priority: Medium    Fall from ground level 02/21/2023     Priority: Medium    Type 2 diabetes mellitus without complication ([facility] 31/33/4451    Hypertension 07/14/2014    Osteoarthritis 07/14/2014    Anxiety and depression 07/14/2014    Anemia 07/14/2014       Past Medical History:   Diagnosis Date    Anemia 2000    Anxiety     Depression     Hypertension     Osteoarthritis        Family History   Problem Relation Age of Onset    Diabetes Mother     Dementia Mother     Diabetes Sister     Diabetes Son        Social History     Tobacco Use    Smoking status: Passive Smoke Exposure - Never Smoker    Smokeless tobacco: Never   Substance Use Topics    Alcohol use: No       Current Facility-Administered Medications   Medication Dose Route Frequency Provider Last Rate Last Admin    zinc oxide 20 % ointment   Topical 4x Daily PRN French Later, DO   Given at 02/22/23 2033    clotrimazole (LOTRIMIN) 1 % cream   Topical BID French Later, DO   Given at 02/23/23 0755    nitrofurantoin (macrocrystal-monohydrate) (MACROBID) capsule 100 mg  100 mg Oral 2 times per day French Later, DO   100 mg at 02/23/23 0753    diazePAM (VALIUM) tablet 10 mg  10 mg Oral Once Rika Gómez MD        levETIRAcetam (KEPPRA) tablet 500 mg  500 mg Oral BID Kavon Colon MD   500 mg at 02/23/23 0753    perflutren lipid microspheres (DEFINITY) injection 1.5 mL  1.5 mL IntraVENous ONCE PRN MARSHAL Gardner - DANIEL        hydrALAZINE (APRESOLINE) injection 10 mg  10 mg IntraVENous Q1H PRN French Later, DO        labetalol (NORMODYNE;TRANDATE) injection 10 mg  10 mg IntraVENous Q30 Min PRN Dixon Castanon,         sodium chloride flush 0.9 % injection 5-40 mL  5-40 mL IntraVENous 2 times per day French Later, DO   10 mL at 02/23/23 0753    sodium chloride flush 0.9 % injection 5-40 mL  5-40 mL IntraVENous PRN Dixon Straffin, DO        0.9 % sodium chloride infusion   IntraVENous PRN Dixon Straffin, DO        ondansetron (ZOFRAN-ODT) disintegrating tablet 4 mg  4 mg Oral Q8H PRN Omak Alma, DO        Or    ondansetron (ZOFRAN) injection 4 mg  4 mg IntraVENous Q6H PRN Omak Alma, DO        sennosides-docusate sodium (SENOKOT-S) 8.6-50 MG tablet 1 tablet  1 tablet Oral BID Omak Alma, DO   1 tablet at 02/23/23 0753    polyethylene glycol (GLYCOLAX) packet 17 g  17 g Oral Daily PRN Omak Alma, DO        acetaminophen (TYLENOL) tablet 1,000 mg  1,000 mg Oral 3 times per day Omak Alma, DO   1,000 mg at 02/23/23 0553    oxyCODONE (ROXICODONE) immediate release tablet 5 mg  5 mg Oral Q4H PRN Omak Alma, DO   5 mg at 02/23/23 0013    Or    oxyCODONE HCl (OXY-IR) immediate release tablet 10 mg  10 mg Oral Q4H PRN Omak Alma, DO   10 mg at 02/23/23 0946    methocarbamol (ROBAXIN) tablet 500 mg  500 mg Oral 4x Daily Omak Alma, DO   500 mg at 02/23/23 0753    glucose chewable tablet 16 g  4 tablet Oral PRN Omak Alma, DO        dextrose bolus 10% 125 mL  125 mL IntraVENous PRN Omak Alma, DO        Or    dextrose bolus 10% 250 mL  250 mL IntraVENous PRN Dixon Straffin, DO        glucagon injection 1 mg  1 mg SubCUTAneous PRN Dixon Straffin, DO        dextrose 10 % infusion   IntraVENous Continuous PRN Omak Alma, DO            Allergies   Allergen Reactions    Ambien [Zolpidem Tartrate]      Sleep walking    Bactrim [Sulfamethoxazole-Trimethoprim] Rash     ROS:   Constitutional: Negative for fever. Positive for activity change and negative for appetite change. HENT: Negative for epistaxis. Eyes: Negative for diploplia, blurred vision. Respiratory: Negative for cough, chest tightness, shortness of breath and wheezing. Cardiovascular: pertinent positives in HPI  Gastrointestinal: Negative for abdominal pain and blood in stool.    All other review of systems are negative     PHYSICAL EXAM:   Vitals:    02/23/23 0802 02/23/23 0946 02/23/23 1016 02/23/23 1131   BP: 107/82   97/61   Pulse: (!) 46   (!) 48   Resp: 20 18 18 16   Temp: (!) 96.5 °F (35.8 °C)   97.8 °F (36.6 °C)   TempSrc: Temporal   Temporal   SpO2: 98%   96%   Weight:       Height:          Constitutional: Well-developed, no acute distress  Eyes: conjunctivae normal, no xanthelasma   Ears, Nose, Throat: oral mucosa moist, no cyanosis   CV: no JVD. Irregular , borderline bradycardic rate and rhythm. No murmur, rubs, or gallops. PMI is nondisplaced  Lungs: clear to auscultation bilaterally, normal respiratory effort without used of accessory muscles  Abdomen: soft, non-tender, bowel sounds present  Skin: warm, no rashes     I have personally reviewed the laboratory, cardiac diagnostic and radiographic testing as outlined below:    Data:    Recent Labs     02/21/23  0004 02/22/23  0559 02/23/23  0419   WBC 10.1 6.5 6.4   HGB 12.3 11.1* 11.5   HCT 38.1 34.3 35.4    136 139     Recent Labs     02/21/23  0004 02/22/23  0559 02/23/23  0419    137 136   K 5.0 4.5 4.1    105 102   CO2 23 24 25   BUN 21 19 19   CREATININE 0.9 0.9 0.9   CALCIUM 8.7 8.2* 8.2*      No results found for: MG  No results for input(s): TSH in the last 72 hours. No results for input(s): INR in the last 72 hours. CXR 2/21/23:   FINDINGS:   Normal cardiomediastinal silhouette. Lungs clear. No pneumothorax or   effusion. Osseous thorax intact. Impression   No acute disease. RECOMMENDATION:   Careful clinical correlation and follow up recommended. Telemetry 02/23/23 : AF 41-75 bpm, maximum pause was 3.3 seconds in afib     EKG 2/21/23: AF 85 bpm, low voltage QRS, old anteroseptal MI, Qtc 454 ms. Please see scan in Cardiology. I have independently reviewed all of the ECGs and rhythm strips per above     Assessment/Plan:  This is a 67 y.o. female with a history of   Patient Active Problem List   Diagnosis    Hypertension    Osteoarthritis    Anxiety and depression    Anemia    Type 2 diabetes mellitus without complication ([address]    Fall from ground level    Atrial fibrillation [facility])    who presents with atrial fibrillation. 1. Newly diagnosed atrial fibrillation  - Diagnosed 2/21/23 on EKG. - Likely persistent AF versus long standing persistent AF.  - Asymptomatic.  - [id number] of 4- not on [address] yet, pending evaluation of MRI of the brain to rule out bleed  - Not on any AV node blocker agent or OAC. - Presents in AF with CVR. 2. Hypertension  - On Zestril. 3. Diabetes mellitus    4. Hyperdensity on brain within right parietal deep matter, hemorrhage versus mass  -Found after patient had fall, patient was not previously on blood thinners    5. Anxiety and depression  - On Pamelor, Risperdal and Restoril. 6. Mechanical fall    7. Anemia  - On Ferrous sulfate. 8. Probable BRE    Recommendations:  Start oral anticoagulation, Eliquis 5 mg BID, if brain MRI shows no evidence of intracranial bleeding and if OK with Neurosurgery and surgery. No plan for rhythm control treatment. No indication for pacing therapy. Sleep study to exclude BRE as an OPD basis. We will discharge on 2 weeks ZIO monitor to monitor heart rates and follow-up in the office as outpatient. Thank you for allowing me to participate in your patient's care. Please call me if there are any questions or concerns. MARSHAL Saenz - CNP  Cardiac Electrophysiology  [facility]) Physicians  [facility] [place]: [place] [facility]  1:47 PM  2/23/2023    Attending Physician's Statement    Patient seen with the ANP. Agree with the findings, assessment and plan. Management plan was discussed.  I have personally interviewed the patient, independently performed a focused cardiac examination, reviewed the pertinent laboratory and diagnostic testing with the patient and directly participated in the medical decision-making as noted above with the following additions:     Feeling well. Remains in AF with HR45-65 bpm with maximum of 3.3 second pause overnight. Physical exam showed no JVD, RRR, normal S1S2, no murmur, clear lungs bilaterally, edema of LEs    Start Eliquis 5 mg BID if no contraindications from Neurosurgery POV. Will schedule 14 day cardiac monitor at discharge. I have spent a total of 50 minutes with the patient and the family reviewing the above stated recommendations. And a total of >50% of that time involved face-to-face time providing counseling and/or coordination of care with the other providers, reviewing records/tests, counseling/education of the patient, ordering medications/tests/procedures, coordinating care, and documenting clinical information in the EHR.      Vernell Millard MD  Cardiac Electrophysiology  [address]  [facility] [place]: [place] [facility]  10:25 PM  2/23/2023

## 2023-02-23 NOTE — PLAN OF CARE
Problem: Chronic Conditions and Co-morbidities  Goal: Patient's chronic conditions and co-morbidity symptoms are monitored and maintained or improved  Recent Flowsheet Documentation  Taken 2/23/2023 0802 by Annia Sanchez RN  Care Plan - Patient's Chronic Conditions and Co-Morbidity Symptoms are Monitored and Maintained or Improved: Monitor and assess patient's chronic conditions and comorbid symptoms for stability, deterioration, or improvement  2/23/2023 0115 by Gonzalo Castillo RN  Outcome: Progressing  Flowsheets (Taken 2/22/2023 2000 by Keyur Connor RN)  Care Plan - Patient's Chronic Conditions and Co-Morbidity Symptoms are Monitored and Maintained or Improved: Monitor and assess patient's chronic conditions and comorbid symptoms for stability, deterioration, or improvement     Problem: Discharge Planning  Goal: Discharge to home or other facility with appropriate resources  Recent Flowsheet Documentation  Taken 2/23/2023 0802 by Annia Sanchez RN  Discharge to home or other facility with appropriate resources: Identify barriers to discharge with patient and caregiver

## 2023-02-23 NOTE — PROGRESS NOTES
This note is for education purposes only, please refer to attending and/or resident note for assessment/plan. GENERAL SURGERY  DAILY PROGRESS NOTE  2/23/2023    CHIEF COMPLAINT:  Chief Complaint   Patient presents with    Fall     Transfer from [place]. Tamea Sprung at home, hit head, left parietal bleed       SUBJECTIVE:  Patient seen at bedside. Leg pain decreased since previous day, though now reporting new back/flank pain. Chart review found that patient did not receive open Mri as planned yesterday due to transport tardiness. Now planned for today 1600. Tolerating diet -- ADULT DIET; Regular; 4 carb choices (60 gm/meal)        OBJECTIVE:  Vitals:  BP (!) 121/52   Pulse 85   Temp 97.8 °F (36.6 °C) (Temporal)   Resp 14   Ht 5' 2\" (1.575 m)   Wt 234 lb (106.1 kg)   SpO2 96%   BMI 42.80 kg/m²       Physical:  GENERAL:  NAD. A&Ox3. HEENT: Normocephalic, Atraumatic. PERRLA, cunjunctiva clear and anicteric. EOMI. LUNGS:  No increased work of breathing. Clear to auscultation bilaterally, no wheezing, rhonchi, or rales. CARDIOVASCULAR: RRR, no murmurs, rubs, or gallops  ABDOMEN:  Soft, non-distended, non-tender. No guarding, rigidity, rebound. : Catheter in place. Dark yellow output. MSK: Extremities symmetric, pulses equal bilaterally. No edema, swelling, or atrophy. R CVA tenderness.    Neuro: GCS 15, cranial nerves grossly intact, no ams      I/Os:    Intake/Output Summary (Last 24 hours) at 2/23/2023 0746  Last data filed at 2/23/2023 4849  Gross per 24 hour   Intake 550 ml   Output 100 ml   Net 450 ml        Labs:  UA Positive for everything  Other labs insignificant       Imaging:  Open MRI scheduled today 4 PM      ASSESSMENT/PLAN:  67 y.o. female with   Intracranial Hemorrhage vs.   On keppra 7 days  Neurosurgery following  Open MRI delayed to today  UTI  Continue Nitrofurantoin/Macrobid  Monitor urine culture results  EKG abnormalities - cardiac pauses vs unlikely infarct  EP consulted  Asymptomatic at current time  Diabetes  Sliding scale insulin  Gait instability  Ambulation with walker  PT/OT to see      [address] Student  2/23/2023  7:46 AM

## 2023-02-24 PROBLEM — I61.9 INTRAPARENCHYMAL HEMORRHAGE OF BRAIN (HCC): Status: ACTIVE | Noted: 2023-02-24

## 2023-02-24 PROBLEM — R26.81 GAIT INSTABILITY: Status: ACTIVE | Noted: 2023-02-24

## 2023-02-24 LAB
ANION GAP SERPL CALCULATED.3IONS-SCNC: 6 MMOL/L (ref 7–16)
BASOPHILS ABSOLUTE: 0.04 E9/L (ref 0–0.2)
BASOPHILS RELATIVE PERCENT: 0.8 % (ref 0–2)
BUN BLDV-MCNC: 19 MG/DL (ref 6–23)
CALCIUM SERPL-MCNC: 8.2 MG/DL (ref 8.6–10.2)
CHLORIDE BLD-SCNC: 104 MMOL/L (ref 98–107)
CO2: 25 MMOL/L (ref 22–29)
CREAT SERPL-MCNC: 0.8 MG/DL (ref 0.5–1)
EOSINOPHILS ABSOLUTE: 0.24 E9/L (ref 0.05–0.5)
EOSINOPHILS RELATIVE PERCENT: 5 % (ref 0–6)
GFR SERPL CREATININE-BSD FRML MDRD: >60 ML/MIN/1.73
GLUCOSE BLD-MCNC: 102 MG/DL (ref 74–99)
HCT VFR BLD CALC: 38.3 % (ref 34–48)
HEMOGLOBIN: 12.2 G/DL (ref 11.5–15.5)
IMMATURE GRANULOCYTES #: 0.01 E9/L
IMMATURE GRANULOCYTES %: 0.2 % (ref 0–5)
LYMPHOCYTES ABSOLUTE: 1.48 E9/L (ref 1.5–4)
LYMPHOCYTES RELATIVE PERCENT: 31 % (ref 20–42)
MCH RBC QN AUTO: 29.3 PG (ref 26–35)
MCHC RBC AUTO-ENTMCNC: 31.9 % (ref 32–34.5)
MCV RBC AUTO: 92.1 FL (ref 80–99.9)
MONOCYTES ABSOLUTE: 0.45 E9/L (ref 0.1–0.95)
MONOCYTES RELATIVE PERCENT: 9.4 % (ref 2–12)
NEUTROPHILS ABSOLUTE: 2.56 E9/L (ref 1.8–7.3)
NEUTROPHILS RELATIVE PERCENT: 53.6 % (ref 43–80)
ORGANISM: ABNORMAL
PDW BLD-RTO: 13.1 FL (ref 11.5–15)
PLATELET # BLD: 142 E9/L (ref 130–450)
PMV BLD AUTO: 9.7 FL (ref 7–12)
POTASSIUM SERPL-SCNC: 4.2 MMOL/L (ref 3.5–5)
RBC # BLD: 4.16 E12/L (ref 3.5–5.5)
SODIUM BLD-SCNC: 135 MMOL/L (ref 132–146)
URINE CULTURE, ROUTINE: ABNORMAL
WBC # BLD: 4.8 E9/L (ref 4.5–11.5)

## 2023-02-24 PROCEDURE — 80048 BASIC METABOLIC PNL TOTAL CA: CPT

## 2023-02-24 PROCEDURE — 6370000000 HC RX 637 (ALT 250 FOR IP): Performed by: SURGERY

## 2023-02-24 PROCEDURE — 99232 SBSQ HOSP IP/OBS MODERATE 35: CPT | Performed by: SURGERY

## 2023-02-24 PROCEDURE — 99233 SBSQ HOSP IP/OBS HIGH 50: CPT | Performed by: INTERNAL MEDICINE

## 2023-02-24 PROCEDURE — 85025 COMPLETE CBC W/AUTO DIFF WBC: CPT

## 2023-02-24 PROCEDURE — 6370000000 HC RX 637 (ALT 250 FOR IP): Performed by: STUDENT IN AN ORGANIZED HEALTH CARE EDUCATION/TRAINING PROGRAM

## 2023-02-24 PROCEDURE — 36415 COLL VENOUS BLD VENIPUNCTURE: CPT

## 2023-02-24 PROCEDURE — 93242 EXT ECG>48HR<7D RECORDING: CPT

## 2023-02-24 PROCEDURE — 2580000003 HC RX 258

## 2023-02-24 PROCEDURE — 97530 THERAPEUTIC ACTIVITIES: CPT

## 2023-02-24 PROCEDURE — 2140000000 HC CCU INTERMEDIATE R&B

## 2023-02-24 PROCEDURE — 6370000000 HC RX 637 (ALT 250 FOR IP)

## 2023-02-24 PROCEDURE — 97535 SELF CARE MNGMENT TRAINING: CPT

## 2023-02-24 RX ORDER — ASPIRIN 81 MG/1
81 TABLET, CHEWABLE ORAL DAILY
Status: DISCONTINUED | OUTPATIENT
Start: 2023-02-24 | End: 2023-02-25 | Stop reason: HOSPADM

## 2023-02-24 RX ORDER — ROPINIROLE 2 MG/1
2 TABLET, FILM COATED ORAL NIGHTLY
Status: DISCONTINUED | OUTPATIENT
Start: 2023-02-24 | End: 2023-02-25 | Stop reason: HOSPADM

## 2023-02-24 RX ORDER — PANTOPRAZOLE SODIUM 40 MG/1
40 TABLET, DELAYED RELEASE ORAL
Status: DISCONTINUED | OUTPATIENT
Start: 2023-02-25 | End: 2023-02-25 | Stop reason: HOSPADM

## 2023-02-24 RX ORDER — DULOXETIN HYDROCHLORIDE 30 MG/1
60 CAPSULE, DELAYED RELEASE ORAL DAILY
Status: DISCONTINUED | OUTPATIENT
Start: 2023-02-24 | End: 2023-02-25 | Stop reason: HOSPADM

## 2023-02-24 RX ORDER — RISPERIDONE 0.25 MG/1
0.25 TABLET ORAL 2 TIMES DAILY
Status: DISCONTINUED | OUTPATIENT
Start: 2023-02-24 | End: 2023-02-25 | Stop reason: HOSPADM

## 2023-02-24 RX ORDER — TEMAZEPAM 15 MG/1
15 CAPSULE ORAL NIGHTLY PRN
Status: DISCONTINUED | OUTPATIENT
Start: 2023-02-24 | End: 2023-02-25 | Stop reason: HOSPADM

## 2023-02-24 RX ADMIN — RISPERIDONE 0.25 MG: 0.25 TABLET, FILM COATED ORAL at 21:27

## 2023-02-24 RX ADMIN — LEVETIRACETAM 500 MG: 500 TABLET, FILM COATED ORAL at 08:13

## 2023-02-24 RX ADMIN — SENNOSIDES AND DOCUSATE SODIUM 1 TABLET: 50; 8.6 TABLET ORAL at 21:27

## 2023-02-24 RX ADMIN — OXYCODONE HYDROCHLORIDE 5 MG: 5 TABLET ORAL at 12:16

## 2023-02-24 RX ADMIN — APIXABAN 5 MG: 5 TABLET, FILM COATED ORAL at 21:27

## 2023-02-24 RX ADMIN — ACETAMINOPHEN 1000 MG: 500 TABLET, FILM COATED ORAL at 13:17

## 2023-02-24 RX ADMIN — OXYCODONE HYDROCHLORIDE 5 MG: 5 TABLET ORAL at 16:46

## 2023-02-24 RX ADMIN — CLOTRIMAZOLE: 10 CREAM TOPICAL at 21:26

## 2023-02-24 RX ADMIN — SODIUM CHLORIDE, PRESERVATIVE FREE 10 ML: 5 INJECTION INTRAVENOUS at 21:27

## 2023-02-24 RX ADMIN — SODIUM CHLORIDE, PRESERVATIVE FREE 10 ML: 5 INJECTION INTRAVENOUS at 08:13

## 2023-02-24 RX ADMIN — NITROFURANTOIN MONOHYDRATE/MACROCRYSTALLINE 100 MG: 25; 75 CAPSULE ORAL at 21:27

## 2023-02-24 RX ADMIN — ACETAMINOPHEN 1000 MG: 500 TABLET, FILM COATED ORAL at 06:27

## 2023-02-24 RX ADMIN — METHOCARBAMOL 500 MG: 500 TABLET ORAL at 21:27

## 2023-02-24 RX ADMIN — OXYCODONE HYDROCHLORIDE 5 MG: 5 TABLET ORAL at 21:27

## 2023-02-24 RX ADMIN — METHOCARBAMOL 500 MG: 500 TABLET ORAL at 16:47

## 2023-02-24 RX ADMIN — RISPERIDONE 0.25 MG: 0.25 TABLET, FILM COATED ORAL at 13:17

## 2023-02-24 RX ADMIN — ACETAMINOPHEN 1000 MG: 500 TABLET, FILM COATED ORAL at 21:27

## 2023-02-24 RX ADMIN — METHOCARBAMOL 500 MG: 500 TABLET ORAL at 08:13

## 2023-02-24 RX ADMIN — CLOTRIMAZOLE: 10 CREAM TOPICAL at 08:13

## 2023-02-24 RX ADMIN — ASPIRIN 81 MG 81 MG: 81 TABLET ORAL at 12:16

## 2023-02-24 RX ADMIN — ROPINIROLE HYDROCHLORIDE 2 MG: 2 TABLET, FILM COATED ORAL at 21:29

## 2023-02-24 RX ADMIN — NITROFURANTOIN MONOHYDRATE/MACROCRYSTALLINE 100 MG: 25; 75 CAPSULE ORAL at 08:13

## 2023-02-24 RX ADMIN — APIXABAN 5 MG: 5 TABLET, FILM COATED ORAL at 12:16

## 2023-02-24 RX ADMIN — SENNOSIDES AND DOCUSATE SODIUM 1 TABLET: 50; 8.6 TABLET ORAL at 08:13

## 2023-02-24 RX ADMIN — OXYCODONE HYDROCHLORIDE 5 MG: 5 TABLET ORAL at 08:19

## 2023-02-24 RX ADMIN — METHOCARBAMOL 500 MG: 500 TABLET ORAL at 12:16

## 2023-02-24 RX ADMIN — DULOXETINE HYDROCHLORIDE 60 MG: 30 CAPSULE, DELAYED RELEASE ORAL at 12:15

## 2023-02-24 ASSESSMENT — ENCOUNTER SYMPTOMS
GASTROINTESTINAL NEGATIVE: 1
ABDOMINAL PAIN: 0
SHORTNESS OF BREATH: 0
COUGH: 0
VOMITING: 0
NAUSEA: 0
DIARRHEA: 0
RESPIRATORY NEGATIVE: 1
ANAL BLEEDING: 0
CONSTIPATION: 0
ABDOMINAL DISTENTION: 0
EYES NEGATIVE: 1
ALLERGIC/IMMUNOLOGIC NEGATIVE: 1
BLOOD IN STOOL: 0
BACK PAIN: 1

## 2023-02-24 ASSESSMENT — PAIN DESCRIPTION - ONSET
ONSET: ON-GOING

## 2023-02-24 ASSESSMENT — PAIN DESCRIPTION - FREQUENCY
FREQUENCY: INTERMITTENT

## 2023-02-24 ASSESSMENT — PAIN DESCRIPTION - PAIN TYPE
TYPE: ACUTE PAIN

## 2023-02-24 ASSESSMENT — PAIN SCALES - GENERAL
PAINLEVEL_OUTOF10: 5
PAINLEVEL_OUTOF10: 6
PAINLEVEL_OUTOF10: 2
PAINLEVEL_OUTOF10: 2
PAINLEVEL_OUTOF10: 0
PAINLEVEL_OUTOF10: 6
PAINLEVEL_OUTOF10: 6
PAINLEVEL_OUTOF10: 0
PAINLEVEL_OUTOF10: 7

## 2023-02-24 ASSESSMENT — PAIN DESCRIPTION - DESCRIPTORS
DESCRIPTORS: ACHING;DISCOMFORT;SORE
DESCRIPTORS: ACHING;DISCOMFORT;SORE
DESCRIPTORS: ACHING;DULL;PRESSURE
DESCRIPTORS: ACHING;DISCOMFORT;SORE
DESCRIPTORS: SORE;ACHING;DISCOMFORT
DESCRIPTORS: ACHING;DISCOMFORT
DESCRIPTORS: ACHING

## 2023-02-24 ASSESSMENT — PAIN DESCRIPTION - ORIENTATION
ORIENTATION: LEFT;RIGHT
ORIENTATION: RIGHT;LEFT
ORIENTATION: RIGHT;LEFT
ORIENTATION: LEFT
ORIENTATION: RIGHT
ORIENTATION: LEFT
ORIENTATION: LEFT;RIGHT

## 2023-02-24 ASSESSMENT — PAIN DESCRIPTION - LOCATION
LOCATION: LEG;FOOT
LOCATION: HIP
LOCATION: HIP
LOCATION: LEG
LOCATION: HIP
LOCATION: LEG;FOOT
LOCATION: HIP

## 2023-02-24 ASSESSMENT — PAIN - FUNCTIONAL ASSESSMENT
PAIN_FUNCTIONAL_ASSESSMENT: ACTIVITIES ARE NOT PREVENTED
PAIN_FUNCTIONAL_ASSESSMENT: PREVENTS OR INTERFERES SOME ACTIVE ACTIVITIES AND ADLS
PAIN_FUNCTIONAL_ASSESSMENT: ACTIVITIES ARE NOT PREVENTED
PAIN_FUNCTIONAL_ASSESSMENT: PREVENTS OR INTERFERES SOME ACTIVE ACTIVITIES AND ADLS
PAIN_FUNCTIONAL_ASSESSMENT: PREVENTS OR INTERFERES SOME ACTIVE ACTIVITIES AND ADLS

## 2023-02-24 NOTE — PROGRESS NOTES
Dr. Rhoda Esposito notified via perfect serve per general surgery request if patient can be restarted on eliquis on discharge.

## 2023-02-24 NOTE — PROGRESS NOTES
This note is for education purposes only, please refer to attending and/or resident note for assessment/plan. GENERAL SURGERY  DAILY PROGRESS NOTE  2/24/2023    CHIEF COMPLAINT:  Chief Complaint   Patient presents with    Fall     Transfer from [place]. Maria C Linares at home, hit head, left parietal bleed       SUBJECTIVE:  Patient seen at bedside. Reported continued R flank pain and no BM since yesterday. Eating okay. Got open MRI and echo done yday. Discharge pending INGRID selection. Tolerating diet -- ADULT DIET; Regular; 4 carb choices (60 gm/meal)        OBJECTIVE:  Vitals:  /64   Pulse 52   Temp (!) 96.5 °F (35.8 °C) (Temporal)   Resp 16   Ht 5' 2\" (1.575 m)   Wt 234 lb (106.1 kg)   SpO2 93%   BMI 42.80 kg/m²       Physical:  GENERAL:  NAD. A&Ox3. HEENT: Normocephalic, Atraumatic. PERRLA, cunjunctiva clear and anicteric. EOMI. LUNGS:  No increased work of breathing. Clear to auscultation bilaterally, no wheezing, rhonchi, or rales. CARDIOVASCULAR: RRR, no murmurs, rubs, or gallops  ABDOMEN:  Soft, non-distended, non-tender. No guarding, rigidity, rebound. : Catheter in place. Dark yellow output. MSK: Extremities symmetric, pulses equal bilaterally. No edema, swelling, or atrophy. R CVA tenderness. Neuro: GCS 15, cranial nerves grossly intact, no ams      I/Os:    Intake/Output Summary (Last 24 hours) at 2/24/2023 1451  Last data filed at 2/24/2023 1442  Gross per 24 hour   Intake 720 ml   Output --   Net 720 ml        Labs:  Calcium: stable low 8.2  Anion Gap: 6       Imaging:  Open MRI 2/23  Lesion in the right parietal periventricular white matter is suggestive of a cavernous malformation, allowing for limitations from open MRI technique. Its size is better appreciated on comparison CT. No acute intracranial hemorrhage. Echo 2/23   Left ventricle is normal in size . Mild left ventricular concentric hypertrophy noted. No regional wall motion abnormalities seen.    Normal left ventricular ejection fraction. The left atrium is moderately dilated. Mildly enlarged right atrium size. Mild thickening of the mitral valve leaflets. The aortic valve appears mildly sclerotic. Mild aortic regurgitation is noted. The tricuspid valve appears structurally normal.   Mild tricuspid regurgitation. Pulmonary hypertension is mild . Dilation of the ascending aorta      ASSESSMENT/PLAN:  67 y.o. female with mechanical fall w/ head trauma 2/21 and atrial fibrillation.   Right Periventricular AV malformation   Open MRI determined AV malformation lesion  Discontinued Keppra, restarting eliquis  Neurosurgery following - discharge  Follow up outpatient in 2 years for MRI  UTI  Continue Macrobid until 2/26  Atrial Fibrillation  Discharge with eliquis  Discharge with Zio monitor - 14 days  Diabetes  Sliding scale insulin  Discharge with home regimen  Gait instability   Ambulation with walker  PT 9/24   OT 14/24    gave pt list of INGRID for discharge      [employer] [employer] Student  2/24/2023  2:51 PM

## 2023-02-24 NOTE — PLAN OF CARE
Problem: Chronic Conditions and Co-morbidities  Goal: Patient's chronic conditions and co-morbidity symptoms are monitored and maintained or improved  Outcome: Progressing     Problem: Discharge Planning  Goal: Discharge to home or other facility with appropriate resources  Outcome: Progressing     Problem: Pain  Goal: Verbalizes/displays adequate comfort level or baseline comfort level  Recent Flowsheet Documentation  Taken 2/24/2023 0300 by Adin Ochoa RN  Verbalizes/displays adequate comfort level or baseline comfort level: Encourage patient to monitor pain and request assistance  Taken 2/23/2023 2330 by Adin Ochoa RN  Verbalizes/displays adequate comfort level or baseline comfort level: Encourage patient to monitor pain and request assistance

## 2023-02-24 NOTE — PLAN OF CARE
Applied Zio XT monitor for 14 days. Serial number [id number]. Instructed patient to avoid showering in the first 24 hours. Press the button on the monitor when you feel a symptom and write it in your diary. Do not submerge in water. No lotion or powder near the patch. Please return the monitor in the box provided.  Patient verbalized understanding

## 2023-02-24 NOTE — PROGRESS NOTES
[facility],[address] and [address] [facility] Electrophysiology  Inpatient progress note   PATIENT: Sania Berg  MEDICAL RECORD NUMBER: [id number]  DATE OF SERVICE:  2/24/2023  ATTENDING ELECTROPHYSIOLOGIST: Michael Levi MD  PRIMARY ELECTROPHYSIOLOGIST: Michael Levi MD  REFERRING PHYSICIAN:   CHIEF COMPLAINT: Tamea Sprung down    HPI: This is a 67 y.o. female with a history of   Patient Active Problem List   Diagnosis    Hypertension    Osteoarthritis    Anxiety and depression    Anemia    Type 2 diabetes mellitus without complication ([facility] [address]    Fall from ground level    Atrial fibrillation ([facility] [address]   who presented to the hospital on 2/21/23 complaining of tripped and fell down. The patient has history of hypertension, diabetes mellitus, obesity, anxiety and depression. The patient presented to the hospital after tripped her slipper and fell down hit the head on the door. She states that she had chronic knee pains but hurt more after fall. She went to [facility] and was found to have possible right parietal intracranial hemorrhage so she was transferred to [facility]. She was seen by Neurosurgery and CT head was thought to be calcified AVM. She is scheduled for brain MRI today. Her initial EKG showed AF with HR 85 bpm. She denies any previous cardiac history. While she is on telemetry showed was noted to have HR of 50-60 bpm with 2.36 second pause at 12.43 PM of 2/22/23. She reports history of vertigo. The patient denies any chest pain, dyspnea, palpitations, syncope, orthopnea or paroxysmal nocturnal dyspnea. Cardiac electrophysiology service is consulted for cardiac pauses. 2/23/2023: Patient lying in bed in no acute distress. She did not have MRI yesterday and is waiting for her MRI to be done today. She continues in atrial fibrillation with slow ventricular rate but no significant pauses on the monitor. She denies any lightheaded or dizziness laying in bed.   She did have some lightheadedness getting up out of bed but this resolved with rest.    2/24/2023: Patient lying in bed no acute distress. She denies any palpitations, lightheadedness, chest pain. She does have bradycardia episodes at rest but heart rate does increase with activity. MRI done yesterday showed no bleed. Awaiting neurosurgical clearance to start Eliquis today. Discussed with patient and she understands anticoagulation initiation and reasoning.     Patient Active Problem List    Diagnosis Date Noted    Atrial fibrillation ([facility] [address] 02/22/2023     Priority: Medium    Fall from ground level 02/21/2023     Priority: Medium    Type 2 diabetes mellitus without complication ([facility] [address] 07/32/0863    Hypertension 07/14/2014    Osteoarthritis 07/14/2014    Anxiety and depression 07/14/2014    Anemia 07/14/2014       Past Medical History:   Diagnosis Date    Anemia 2000    Anxiety     Depression     Hypertension     Osteoarthritis        Family History   Problem Relation Age of Onset    Diabetes Mother     Dementia Mother     Diabetes Sister     Diabetes Son        Social History     Tobacco Use    Smoking status: Passive Smoke Exposure - Never Smoker    Smokeless tobacco: Never   Substance Use Topics    Alcohol use: No       Current Facility-Administered Medications   Medication Dose Route Frequency Provider Last Rate Last Admin    apixaban (ELIQUIS) tablet 5 mg  5 mg Oral BID Peyman Bland MD   5 mg at 02/24/23 1216    aspirin chewable tablet 81 mg  81 mg Oral Daily Peyman Bland MD   81 mg at 02/24/23 1216    DULoxetine (CYMBALTA) extended release capsule 60 mg  60 mg Oral Daily Peyman Bland MD   60 mg at 02/24/23 1215    rOPINIRole (REQUIP) tablet 2 mg  2 mg Oral Nightly Peyman Bland MD        temazepam (RESTORIL) capsule 15 mg  15 mg Oral Nightly PRN Peyman Bland MD        risperiDONE (RISPERDAL) tablet 0.25 mg  0.25 mg Oral BID Peyman Bland MD        [START ON 2/25/2023] pantoprazole (PROTONIX) tablet 40 mg  40 mg Oral QAM AC Suzan Brewster MD        zinc oxide 20 % ointment   Topical 4x Daily PRN Everett Pummel, DO   Given at 02/22/23 2033    clotrimazole (LOTRIMIN) 1 % cream   Topical BID Everett Pummel, DO   Given at 02/24/23 0813    nitrofurantoin (macrocrystal-monohydrate) (MACROBID) capsule 100 mg  100 mg Oral 2 times per day Everett Pummel, DO   100 mg at 02/24/23 0813    hydrALAZINE (APRESOLINE) injection 10 mg  10 mg IntraVENous Q1H PRN Everett Pummel, DO        labetalol (NORMODYNE;TRANDATE) injection 10 mg  10 mg IntraVENous Q30 Min PRN Dixon Straffin, DO        sodium chloride flush 0.9 % injection 5-40 mL  5-40 mL IntraVENous 2 times per day Everett Pummel, DO   10 mL at 02/24/23 0813    sodium chloride flush 0.9 % injection 5-40 mL  5-40 mL IntraVENous PRN Dixon Straffin, DO        0.9 % sodium chloride infusion   IntraVENous PRN Dixon Straffin, DO        ondansetron (ZOFRAN-ODT) disintegrating tablet 4 mg  4 mg Oral Q8H PRN Everett Pummel, DO        Or    ondansetron (ZOFRAN) injection 4 mg  4 mg IntraVENous Q6H PRN Everett Pummel, DO        sennosides-docusate sodium (SENOKOT-S) 8.6-50 MG tablet 1 tablet  1 tablet Oral BID Everett Pummel, DO   1 tablet at 02/24/23 0813    polyethylene glycol (GLYCOLAX) packet 17 g  17 g Oral Daily PRN Everett Pummel, DO        acetaminophen (TYLENOL) tablet 1,000 mg  1,000 mg Oral 3 times per day Everett Pummel, DO   1,000 mg at 02/24/23 4912    oxyCODONE (ROXICODONE) immediate release tablet 5 mg  5 mg Oral Q4H PRN Everett Pummel, DO   5 mg at 02/24/23 1216    Or    oxyCODONE HCl (OXY-IR) immediate release tablet 10 mg  10 mg Oral Q4H PRN Everett Pummel, DO   10 mg at 02/23/23 2108    methocarbamol (ROBAXIN) tablet 500 mg  500 mg Oral 4x Daily Dixon Castanon DO   500 mg at 02/24/23 1216    glucose chewable tablet 16 g  4 tablet Oral PRN Dixon Castanon DO        dextrose bolus 10% 125 mL  125 mL IntraVENous PRN Elda Concepcion Straffin, DO        Or    dextrose bolus 10% 250 mL  250 mL IntraVENous PRN Dixon Straffin, DO        glucagon injection 1 mg  1 mg SubCUTAneous PRN Dixon Straffin, DO        dextrose 10 % infusion   IntraVENous Continuous PRN Larri Gil, DO            Allergies   Allergen Reactions    Ambien [Zolpidem Tartrate]      Sleep walking    Bactrim [Sulfamethoxazole-Trimethoprim] Rash     ROS:   Constitutional: Negative for fever. Positive for activity change and negative for appetite change. HENT: Negative for epistaxis. Eyes: Negative for diploplia, blurred vision. Respiratory: Negative for cough, chest tightness, shortness of breath and wheezing. Cardiovascular: pertinent positives in HPI  Gastrointestinal: Negative for abdominal pain and blood in stool. All other review of systems are negative     PHYSICAL EXAM:   Vitals:    02/24/23 0816 02/24/23 0819 02/24/23 1153 02/24/23 1216   BP: 136/64  134/64    Pulse: (!) 47  52    Resp: 18 18 18 16   Temp: 96.9 °F (36.1 °C)  (!) 96.5 °F (35.8 °C)    TempSrc: Temporal  Temporal    SpO2: 94%  93%    Weight:       Height:          Constitutional: Well-developed, no acute distress  Eyes: conjunctivae normal, no xanthelasma   Ears, Nose, Throat: oral mucosa moist, no cyanosis   CV: no JVD. Irregular , borderline bradycardic rate and rhythm. No murmur, rubs, or gallops.  PMI is nondisplaced  Lungs: clear to auscultation bilaterally, normal respiratory effort without used of accessory muscles  Abdomen: soft, non-tender, bowel sounds present  Skin: warm, no rashes     I have personally reviewed the laboratory, cardiac diagnostic and radiographic testing as outlined below:    Data:    Recent Labs     02/22/23  0559 02/23/23  0419 02/24/23  0655   WBC 6.5 6.4 4.8   HGB 11.1* 11.5 12.2   HCT 34.3 35.4 38.3    139 142     Recent Labs     02/22/23  0559 02/23/23  0419 02/24/23  0655    136 135   K 4.5 4.1 4.2    102 104   CO2 24 25 25   BUN 19 19 19 CREATININE 0.9 0.9 0.8   CALCIUM 8.2* 8.2* 8.2*      No results found for: MG  No results for input(s): TSH in the last 72 hours. No results for input(s): INR in the last 72 hours. CXR 2/21/23:   FINDINGS:   Normal cardiomediastinal silhouette. Lungs clear. No pneumothorax or   effusion. Osseous thorax intact. Impression   No acute disease. RECOMMENDATION:   Careful clinical correlation and follow up recommended. Telemetry 02/24/23 : AF 41-75 bpm, maximum pause was 3.3 seconds in afib on 2/23/23      EKG 2/21/23: AF 85 bpm, low voltage QRS, old anteroseptal MI, Qtc 454 ms. Please see scan in Cardiology. I have independently reviewed all of the ECGs and rhythm strips per above     Assessment/Plan: This is a 67 y.o. female with a history of   Patient Active Problem List   Diagnosis    Hypertension    Osteoarthritis    Anxiety and depression    Anemia    Type 2 diabetes mellitus without complication ([address]    Fall from ground level    Atrial fibrillation [facility])    who presents with atrial fibrillation. 1. Newly diagnosed atrial fibrillation  - Diagnosed 2/21/23 on EKG. - Likely persistent AF versus long standing persistent AF.  - Asymptomatic.  - [id number] of 4- not on [address] yet, pending evaluation of MRI of the brain to rule out bleed  - Not on any AV node blocker agent or OAC. - Presents in AF with CVR, slow rates at times with rest     2. Hypertension  - On Zestril. 3. Diabetes mellitus    4. Hyperdensity on brain within right parietal deep matter, hemorrhage versus mass  -Found after patient had fall, patient was not previously on blood thinners    5. Anxiety and depression  - On Pamelor, Risperdal and Restoril. 6. Mechanical fall    7. Anemia  - On Ferrous sulfate. 8. Probable BRE    Recommendations:  Start oral anticoagulation, Eliquis 5 mg BID, if ok with NS   No plan for rhythm control treatment. No indication for pacing therapy.   Sleep study to exclude BRE as an OPD basis. We will discharge on 2 weeks ZIO monitor to monitor heart rates and follow-up in the office as outpatient. Thank you for allowing me to participate in your patient's care. Please call me if there are any questions or concerns. MARSHAL Zamorano - CNP  Cardiac Electrophysiology  [facility]) Physicians  [facility]  2/24/2023    Attending Physician's Statement    Patient seen with the ANP. Agree with the findings, assessment and plan. Management plan was discussed. I have personally interviewed the patient, independently performed a focused cardiac examination, reviewed the pertinent laboratory and diagnostic testing with the patient and directly participated in the medical decision-making as noted above with the following additions:     Feeling well. Denies chest pain,  Remains in AF with HR 40-50 bpm. No significant pause seen. Physical exam showed no JVD, IRIR, no murmur, clear lungs bilaterally, no edema, edema of Les    Start Eliquis 5 mg BID if OK by Neurosurgery. No indication for pacing therapy. Sleep study as OPD to exclude BRE. Will schedule 14 day cardiac monitor at discharge. I have spent a total of 50 minutes with the patient and the family reviewing the above stated recommendations. And a total of >50% of that time involved face-to-face time providing counseling and/or coordination of care with the other providers, reviewing records/tests, counseling/education of the patient, ordering medications/tests/procedures, coordinating care, and documenting clinical information in the EHR.      Jacey Pena MD  Cardiac Electrophysiology  [address]   [facility]  1:51 PM  2/24/2023

## 2023-02-24 NOTE — PROGRESS NOTES
Patient was assessed throughout the shift. Vital signs stable throughout shift. No significant changes in patient's condition. Both falls risk and skin reduction interventions remain in place.

## 2023-02-24 NOTE — PROGRESS NOTES
Neurosurg progress note  VITALS:  /64   Pulse (!) 47   Temp 96.9 °F (36.1 °C) (Temporal)   Resp 18   Ht 5' 2\" (1.575 m)   Wt 234 lb (106.1 kg)   SpO2 94%   BMI 42.80 kg/m²   24HR INTAKE/OUTPUT:    Intake/Output Summary (Last 24 hours) at 2/24/2023 1122  Last data filed at 2/24/2023 7513  Gross per 24 hour   Intake 840 ml   Output 400 ml   Net 440 ml     XR PELVIS (1-2 VIEWS)    Result Date: 2/21/2023  EXAMINATION: ONE XRAY VIEW OF THE PELVIS 2/21/2023 3:03 am COMPARISON: None. HISTORY: ORDERING SYSTEM PROVIDED HISTORY: Trauma fall TECHNOLOGIST PROVIDED HISTORY: Reason for exam:->trauma fall What reading provider will be dictating this exam?->CRC FINDINGS: Abnormal appearance bilateral femoral necks concerning for fracture at either site. Significant overlying soft tissue obscures cortical detail. Question minimally displaced fractures right superior and inferior pubic rami. Soft tissues unremarkable. Proximal intramedullary fixation left femur partially observed. Abnormal appearance bilateral femoral necks concerning for fracture at either site. Question minimally displaced fractures right superior and inferior pubic rami. Significant overlying soft tissue obscures cortical detail. Consider CT evaluation. RECOMMENDATION: Careful clinical correlation and follow up recommended. XR KNEE LEFT (1-2 VIEWS)    Result Date: 2/21/2023  EXAMINATION: TWO XRAY VIEWS OF THE LEFT KNEE 2/21/2023 2:01 am COMPARISON: None. HISTORY: ORDERING SYSTEM PROVIDED HISTORY: pain TECHNOLOGIST PROVIDED HISTORY: Reason for exam:->pain What reading provider will be dictating this exam?->CRC FINDINGS: No fracture, dislocation or osseous lesion. Total knee arthroplasty intact. Distal femoral intramedullary fixation intact. No effusion. Soft tissues unremarkable. No acute disease. RECOMMENDATION: Careful clinical correlation and follow up recommended.      XR KNEE RIGHT (1-2 VIEWS)    Result Date: 2/21/2023  EXAMINATION: TWO XRAY VIEWS OF THE RIGHT KNEE 2/21/2023 5:59 am COMPARISON: None. HISTORY: ORDERING SYSTEM PROVIDED HISTORY: knee pain after fall TECHNOLOGIST PROVIDED HISTORY: Please include AP/lateral Reason for exam:->knee pain after fall What reading provider will be dictating this exam?->CRC FINDINGS: No fracture, dislocation or osseous lesion. Total knee arthroplasty. Distal femoral ORIF partially visualized. All hardware intact. No effusion. Soft tissues unremarkable. No acute disease. RECOMMENDATION: Careful clinical correlation and follow up recommended. CT HEAD WO CONTRAST    Result Date: 2/21/2023  EXAMINATION: CT OF THE HEAD WITHOUT CONTRAST  2/21/2023 3:23 am TECHNIQUE: CT of the head was performed without the administration of intravenous contrast. Automated exposure control, iterative reconstruction, and/or weight based adjustment of the mA/kV was utilized to reduce the radiation dose to as low as reasonably achievable. COMPARISON: None available. HISTORY: ORDERING SYSTEM PROVIDED HISTORY: questionable ICH TECHNOLOGIST PROVIDED HISTORY: Reason for exam:->questionable ICH Has a \"code stroke\" or \"stroke alert\" been called? ->No Decision Support Exception - unselect if not a suspected or confirmed emergency medical condition->Emergency Medical Condition (MA) What reading provider will be dictating this exam?->CRC FINDINGS: BRAIN/VENTRICLES: 18 mm hyperdensity within the right parietal deep white matter suggesting hemorrhage versus mass versus dystrophic calcification. Prior imaging not available on this database for direct comparison. No significant mass effect or surrounding edema. No abnormal extra-axial fluid collection. The gray-white differentiation is maintained without evidence of an acute infarct. There is no evidence of hydrocephalus. ORBITS: The visualized portion of the orbits demonstrate no acute abnormality.  SINUSES: The visualized paranasal sinuses and mastoid air cells demonstrate no acute abnormality. SOFT TISSUES/SKULL:  No acute abnormality of the visualized skull or soft tissues. 18 mm hyperdensity within the right parietal deep white matter suggesting hemorrhage versus mass. Prior imaging not available on this database for direct comparison. No significant mass effect or surrounding edema. RECOMMENDATIONS: Careful clinical correlation and follow up recommended. MRI evaluation recommended. XR CHEST PORTABLE    Result Date: 2/21/2023  EXAMINATION: ONE XRAY VIEW OF THE CHEST 2/21/2023 3:03 am COMPARISON: None. HISTORY: ORDERING SYSTEM PROVIDED HISTORY: Trauma fall TECHNOLOGIST PROVIDED HISTORY: Reason for exam:->trauma fall What reading provider will be dictating this exam?->CRC FINDINGS: Normal cardiomediastinal silhouette. Lungs clear. No pneumothorax or effusion. Osseous thorax intact. No acute disease. RECOMMENDATION: Careful clinical correlation and follow up recommended. CT FEMUR LEFT WO CONTRAST    Result Date: 2/21/2023  EXAMINATION: CT OF THE LEFT FEMUR WITHOUT CONTRAST 2/21/2023 7:01 am TECHNIQUE: CT of the left femur was performed without the administration of intravenous contrast.  Multiplanar reformatted images are provided for review. Automated exposure control, iterative reconstruction, and/or weight based adjustment of the mA/kV was utilized to reduce the radiation dose to as low as reasonably achievable. COMPARISON: AP pelvis, left hip, left femur x-ray exams 02/21/2023 HISTORY ORDERING SYSTEM PROVIDED HISTORY: left distal femur pain after fall, TKA and retrograde nail in place TECHNOLOGIST PROVIDED HISTORY: Reason for exam:->left distal femur pain after fall, TKA and retrograde nail in place What reading provider will be dictating this exam?->CRC FINDINGS: No dislocation or acute fracture. Remote, healed fracture of the distal left femur transfixed with a long intramedullary nail and with proximal and distal locking screws.   Left total knee arthroplasty and no complication visualized. Some unavoidable CT reconstruction artifact related to surgical hardware. The fixation hardware appears intact and without loosening. Intact proximal femur, acetabulum, and pubic rami on the left. Mild osteoarthritis of the left hip. The pubic symphysis and left SI joint are not widened. The right hemipelvis was not imaged. No soft tissue hematoma or suspicious fluid collections. No free pelvic fluid identified and the left pelvic sidewall appears intact. 1.  No dislocation or recent fracture. Remote, healed fracture of the distal left femur. No soft tissue hematoma or free pelvic fluid. 2.  Mild osteoarthritis, left femoroacetabular joint. RECOMMENDATIONS: Unavailable     XR HIP 2-3 VW W PELVIS LEFT    Result Date: 2/21/2023  EXAMINATION: ONE XRAY VIEW OF THE PELVIS AND TWO XRAY VIEWS LEFT HIP 2/21/2023 5:58 am COMPARISON: 2 hours prior. HISTORY: ORDERING SYSTEM PROVIDED HISTORY: left hip pain, fall TECHNOLOGIST PROVIDED HISTORY: Reason for exam:->left hip pain, fall What reading provider will be dictating this exam?->CRC FINDINGS: Osteopenia. No visible fracture. Right pubic ramus appears intact on the submitted images. ORIF hardware bilateral femurs partially visualized. Soft tissues unremarkable. No visible fracture. RECOMMENDATION: Careful clinical correlation and follow up recommended.      CBC:   Lab Results   Component Value Date/Time    WBC 4.8 02/24/2023 06:55 AM    RBC 4.16 02/24/2023 06:55 AM    HGB 12.2 02/24/2023 06:55 AM    HCT 38.3 02/24/2023 06:55 AM    MCV 92.1 02/24/2023 06:55 AM    MCH 29.3 02/24/2023 06:55 AM    MCHC 31.9 02/24/2023 06:55 AM    RDW 13.1 02/24/2023 06:55 AM     02/24/2023 06:55 AM    MPV 9.7 02/24/2023 06:55 AM     BMP:    Lab Results   Component Value Date/Time     02/24/2023 06:55 AM    K 4.2 02/24/2023 06:55 AM    K 4.5 02/22/2023 05:59 AM     02/24/2023 06:55 AM    CO2 25 02/24/2023 06:55 AM    BUN 19 02/24/2023 06:55 AM    LABALBU 3.0 02/21/2023 12:04 AM    CREATININE 0.8 02/24/2023 06:55 AM    CALCIUM 8.2 02/24/2023 06:55 AM    GFRAA >60 01/21/2019 12:00 PM    LABGLOM >60 02/24/2023 06:55 AM    GLUCOSE 102 02/24/2023 06:55 AM      clotrimazole   Topical BID    nitrofurantoin (macrocrystal-monohydrate)  100 mg Oral 2 times per day    levETIRAcetam  500 mg Oral BID    sodium chloride flush  5-40 mL IntraVENous 2 times per day    sennosides-docusate sodium  1 tablet Oral BID    acetaminophen  1,000 mg Oral 3 times per day    methocarbamol  500 mg Oral 4x Daily     Patient remains awake and alert oriented friendly cooperative this struggling to ambulate independently  Assessment:  Patient Active Problem List   Diagnosis    Hypertension    Osteoarthritis    Anxiety and depression    Anemia    Type 2 diabetes mellitus without complication (HCC)    Fall from ground level    Atrial fibrillation ([facility] [address]     Plan: I results carefully discussed with the patient all questions are answered I recommend a follow-up MRI in 2 years and likely needs PT OT possibly inpatient rehab     Jessica Jade MD M.D.

## 2023-02-24 NOTE — PROGRESS NOTES
Occupational Therapy  OT BEDSIDE TREATMENT NOTE   [facility] [address]  [address] [place] [address]      PTLK:  Patient Name: Addison Salmeron  MRN: [id number]  : 1950  Room: [address] [place] [address]     Evaluating OT: Robby Boggs OTR/L #[id number]      Referring Provider: Harpreet Kay DO  Specific Provider Orders/Date: OT eval and treat 23     Diagnosis: Injury of head, initial encounter [S09.90XA]  Fall from standing, initial encounter [W19. XXXA]  Fall from ground level [W18.30XA]  Right knee pain, unspecified chronicity [M25.561]   Pt admitted to hospital following fall. + right parietal bleed     Pertinent Medical History:  has a past medical history of Anemia, Anxiety, Depression, Hypertension, and Osteoarthritis.          Precautions:  Fall Risk, WBAT L LE, TAPS     Assessment of current deficits    [x] Functional mobility          [x]ADLs           [x] Strength                  []Cognition    [x] Functional transfers        [x] IADLs         [x] Safety Awareness   [x]Endurance    [] Fine Coordination                        [x] Balance      [] Vision/perception   []Sensation      []Gross Motor Coordination            [] ROM           [] Delirium                   [] Motor Control      OT PLAN OF CARE   OT POC based on physician orders, patient diagnosis and results of clinical assessment     Frequency/Duration 1-3 days/wk for 2 weeks PRN   Specific OT Treatment Interventions to include:   * Instruction/training on adapted ADL techniques and AE recommendations to increase functional independence within precautions       * Training on energy conservation strategies, correct breathing pattern and techniques to improve independence/tolerance for self-care routine  * Functional transfer/mobility training/DME recommendations for increased independence, safety, and fall prevention  * Patient/Family education to increase follow through with safety techniques and functional independence  * Recommendation of environmental modifications for increased safety with functional transfers/mobility and ADLs  * Therapeutic exercise to improve motor endurance, ROM, and functional strength for ADLs/functional transfers  * Therapeutic activities to facilitate/challenge dynamic balance, stand tolerance for increased safety and independence with ADLs  * Therapeutic activities to facilitate gross/fine motor skills for increased independence with ADLs  * Neuro-muscular re-education: facilitation of righting/equilibrium reactions, midline orientation, scapular stability/mobility, normalization of muscle tone, and facilitation of volitional active controled movement        Modified [place] Scale (MRS)  Score     Description  0             No symptoms  1             No significant disability despite symptoms  2             Slight disability; able to look after own affairs  3             Moderate disability; able to ambulate without assist/ requires assist with ADLs  4             Moderate/Severe disability;requires assist to ambulate/assist with ADLs  5             Severe disability;bedridden/incontinent   6               Score:   5     Recommended Adaptive Equipment:  TBD      Home Living: Pt resides at nursing facility    Bathroom setup: handicap accessible    Equipment owned: w/w, scooter     Prior Level of Function: assist prn with ADLs , assist with IADLs; ambulated with w/w vs scooter utilized for mobility    Driving: no   Occupation: na     Pain Level: Pt reports 6/10 L LE pain; Therapist facilitated repositioning to address pain       Cognition: A&O: x3 (self, place, month./year);  Follows 1 step directions             Memory:  good             Sequencing:  good             Problem solving:  fair             Judgement/safety:  fair               Functional Assessment:  AM-PAC Daily Activity Raw Score:     Initial Eval Status  Date: 23 Treatment Status  Date:23 STGs = LTGs  Time frame: 10-14 days   Feeding Independent  Independent      Grooming Minimal Assist  SBA  Sitting EOB  Modified [place]    UB Dressing Minimal Assist  SBA  To dof/don gown sitting EOB  Modified [place]    LB Dressing Dependent  Dependent  Pt would benefit from use of AE  Maximal Assist    Bathing Maximal Assist Mod A   For sponge bathing tasks sitting EOB, vc for weight shifting tech seated to complete LB bathing Moderate Assist    Toileting Dependent  Dependent  Moderate Assist    Bed Mobility  Supine to sit: Maximal Assist  Sit to supine: Maximal Assist  Supine to sit: Maximal Assist  Sit to supine: Maximal Assist   Supine to sit: Minimal Assist   Sit to supine: Minimal Assist    Functional Transfers Maximal Assist x2 Max A STS from EOB  Pt presenting with poor flexed posture, 3 attempts for 1 successful stand  Moderate Assist   Functional Mobility NT     Unable to complete side steps NT     Unable to complete side steps  Maximal Assist    Balance Sitting:     Static:  Min A    Dynamic:min A  Standing: max A x2 at w/w Sitting:     Static:  Min A    Dynamic:min A  Standing: max A x1-2 at w/w      Activity Tolerance F-     Limited due to pain  F-     Limited due to pain F+   Visual/  Perceptual Glasses: yes  wfl                             Hand Dominance right    Strength ROM Additional Info:    RUE   3-/5 proximal  3+/5 distal    Shoulder 1/2 ROM  Elbow wfl good  and wfl FMC/dexterity noted during ADL tasks      LUE 4-/5 wfl good  and wfl FMC/dexterity noted during ADL tasks      Hearing: wfl  Sensation:wfl  Tone: wfl  Edema:none noted       Comments: Per RN, pt OK for treatment. Upon arrival pt supine in bed. ADL retraining to increase safety and indep in dressing and bathing tasks, balance and trf training to increase participation in functional mobility and standing aspects of ADLs with increased safety.  Pt educated on techniques to increase independence and safety during ADLs, bed mobility, and functional transfers. Pt would benefit from continued skilled OT to increase safety and independence with completion of ADL/IADL tasks for functional independence and quality of life. At end of session pt returned to supine, call light within reach. Pt has made fair- progress towards set goals.      Continue with current plan of care    Treatment Time In:0910            Treatment Time Out: 2487             Treatment Charges: Mins Units   Ther Ex  86119     Manual Therapy 58347     Thera Activities 42401 10 1   ADL/Home Mgt 68160 30 2   Neuro Re-ed 59048     Group Therapy      Orthotic manage/training  17025     Non-Billable Time     Total Timed Treatment 40 [facility] Rex FLORES/L [id number]

## 2023-02-24 NOTE — PLAN OF CARE
Problem: Chronic Conditions and Co-morbidities  Goal: Patient's chronic conditions and co-morbidity symptoms are monitored and maintained or improved  Outcome: Progressing  Flowsheets (Taken 2/23/2023 0802 by India Estrella RN)  Care Plan - Patient's Chronic Conditions and Co-Morbidity Symptoms are Monitored and Maintained or Improved: Monitor and assess patient's chronic conditions and comorbid symptoms for stability, deterioration, or improvement     Problem: Discharge Planning  Goal: Discharge to home or other facility with appropriate resources  Outcome: Progressing  Flowsheets (Taken 2/23/2023 0802 by India Estrella RN)  Discharge to home or other facility with appropriate resources: Identify barriers to discharge with patient and caregiver     Problem: Pain  Goal: Verbalizes/displays adequate comfort level or baseline comfort level  2/23/2023 1928 by Harlan Buck RN  Outcome: Progressing  2/23/2023 1034 by India Estrella RN  Outcome: Progressing     Problem: Safety - Adult  Goal: Free from fall injury  2/23/2023 1928 by Harlan Buck RN  Outcome: Progressing  2/23/2023 1034 by India Estrella RN  Outcome: Progressing     Problem: ABCDS Injury Assessment  Goal: Absence of physical injury  Outcome: Progressing     Problem: Skin/Tissue Integrity  Goal: Absence of new skin breakdown  Description: 1. Monitor for areas of redness and/or skin breakdown  2. Assess vascular access sites hourly  3. Every 4-6 hours minimum:  Change oxygen saturation probe site  4. Every 4-6 hours:  If on nasal continuous positive airway pressure, respiratory therapy assess nares and determine need for appliance change or resting period.   2/23/2023 1928 by Harlan Buck RN  Outcome: Progressing  2/23/2023 1034 by India Estrella RN  Outcome: Progressing

## 2023-02-24 NOTE — CARE COORDINATION
Spoke with Pt about her Therapy scores. And Dr recommending [facility] for rehab. Gave list of [facility]. Pt stated that she did better with therapy this morning. Will stop back for choices.  Is looking at discharge today No pre-cert needed for [facility]. SW/CM following for discharge needs. Attempted to reach Raymon Savage 55 ( [phone]) but no answer . Will try again.    Sanna Costello, L.S.W.  [phone]

## 2023-02-24 NOTE — CARE COORDINATION
Received VM from Dr castellon Pt needing [facility] d/t PT 9/24. Will choice Pt.    Vera Fothergill, L.S.W.  [phone]

## 2023-02-24 NOTE — PROGRESS NOTES
Left voicemail with social work Vinicio Womack regarding patient's AM-PAC score of 9 out of 24 and input on going home versus placement. We will ask neurosurgery today if they are okay with stopping Keppra as well as starting Eliquis, as patient has been evaluated by EP and that is their recommendation. MRI results yesterday consistent with AV malformation and not intracranial hemorrhage.     Dr. Delmi Marquez

## 2023-02-25 VITALS
SYSTOLIC BLOOD PRESSURE: 105 MMHG | TEMPERATURE: 97 F | DIASTOLIC BLOOD PRESSURE: 73 MMHG | BODY MASS INDEX: 43.06 KG/M2 | HEIGHT: 62 IN | RESPIRATION RATE: 18 BRPM | OXYGEN SATURATION: 97 % | WEIGHT: 234 LBS | HEART RATE: 58 BPM

## 2023-02-25 LAB
ANION GAP SERPL CALCULATED.3IONS-SCNC: 9 MMOL/L (ref 7–16)
BASOPHILS ABSOLUTE: 0.04 E9/L (ref 0–0.2)
BASOPHILS RELATIVE PERCENT: 0.8 % (ref 0–2)
BUN BLDV-MCNC: 19 MG/DL (ref 6–23)
CALCIUM SERPL-MCNC: 8.3 MG/DL (ref 8.6–10.2)
CHLORIDE BLD-SCNC: 103 MMOL/L (ref 98–107)
CO2: 25 MMOL/L (ref 22–29)
CREAT SERPL-MCNC: 0.8 MG/DL (ref 0.5–1)
EOSINOPHILS ABSOLUTE: 0.2 E9/L (ref 0.05–0.5)
EOSINOPHILS RELATIVE PERCENT: 4.1 % (ref 0–6)
GFR SERPL CREATININE-BSD FRML MDRD: >60 ML/MIN/1.73
GLUCOSE BLD-MCNC: 102 MG/DL (ref 74–99)
HCT VFR BLD CALC: 37.4 % (ref 34–48)
HEMOGLOBIN: 12 G/DL (ref 11.5–15.5)
IMMATURE GRANULOCYTES #: 0.02 E9/L
IMMATURE GRANULOCYTES %: 0.4 % (ref 0–5)
LYMPHOCYTES ABSOLUTE: 1.46 E9/L (ref 1.5–4)
LYMPHOCYTES RELATIVE PERCENT: 30.3 % (ref 20–42)
MCH RBC QN AUTO: 28.6 PG (ref 26–35)
MCHC RBC AUTO-ENTMCNC: 32.1 % (ref 32–34.5)
MCV RBC AUTO: 89 FL (ref 80–99.9)
MONOCYTES ABSOLUTE: 0.47 E9/L (ref 0.1–0.95)
MONOCYTES RELATIVE PERCENT: 9.8 % (ref 2–12)
NEUTROPHILS ABSOLUTE: 2.63 E9/L (ref 1.8–7.3)
NEUTROPHILS RELATIVE PERCENT: 54.6 % (ref 43–80)
PDW BLD-RTO: 13.2 FL (ref 11.5–15)
PLATELET # BLD: 149 E9/L (ref 130–450)
PMV BLD AUTO: 10 FL (ref 7–12)
POTASSIUM SERPL-SCNC: 4.2 MMOL/L (ref 3.5–5)
RBC # BLD: 4.2 E12/L (ref 3.5–5.5)
SARS-COV-2, NAAT: NOT DETECTED
SODIUM BLD-SCNC: 137 MMOL/L (ref 132–146)
WBC # BLD: 4.8 E9/L (ref 4.5–11.5)

## 2023-02-25 PROCEDURE — 6370000000 HC RX 637 (ALT 250 FOR IP): Performed by: STUDENT IN AN ORGANIZED HEALTH CARE EDUCATION/TRAINING PROGRAM

## 2023-02-25 PROCEDURE — 6370000000 HC RX 637 (ALT 250 FOR IP)

## 2023-02-25 PROCEDURE — 99232 SBSQ HOSP IP/OBS MODERATE 35: CPT | Performed by: SURGERY

## 2023-02-25 PROCEDURE — 99233 SBSQ HOSP IP/OBS HIGH 50: CPT | Performed by: INTERNAL MEDICINE

## 2023-02-25 PROCEDURE — 2580000003 HC RX 258

## 2023-02-25 PROCEDURE — 36415 COLL VENOUS BLD VENIPUNCTURE: CPT

## 2023-02-25 PROCEDURE — 80048 BASIC METABOLIC PNL TOTAL CA: CPT

## 2023-02-25 PROCEDURE — 87635 SARS-COV-2 COVID-19 AMP PRB: CPT

## 2023-02-25 PROCEDURE — 85025 COMPLETE CBC W/AUTO DIFF WBC: CPT

## 2023-02-25 RX ORDER — POLYETHYLENE GLYCOL 3350 17 G/17G
17 POWDER, FOR SOLUTION ORAL DAILY PRN
Qty: 527 G | Refills: 1 | Status: SHIPPED | OUTPATIENT
Start: 2023-02-25 | End: 2023-03-27

## 2023-02-25 RX ORDER — OXYCODONE HYDROCHLORIDE 5 MG/1
5 TABLET ORAL EVERY 6 HOURS PRN
Qty: 28 TABLET | Refills: 0 | Status: SHIPPED | OUTPATIENT
Start: 2023-02-25 | End: 2023-02-25 | Stop reason: SDUPTHER

## 2023-02-25 RX ORDER — FERROUS SULFATE 325(65) MG
325 TABLET ORAL
Status: DISCONTINUED | OUTPATIENT
Start: 2023-02-25 | End: 2023-02-25 | Stop reason: HOSPADM

## 2023-02-25 RX ORDER — NITROFURANTOIN 25; 75 MG/1; MG/1
100 CAPSULE ORAL EVERY 12 HOURS SCHEDULED
Qty: 3 CAPSULE | Refills: 0 | Status: SHIPPED | OUTPATIENT
Start: 2023-02-25 | End: 2023-02-27

## 2023-02-25 RX ORDER — OXYCODONE HYDROCHLORIDE 5 MG/1
5 TABLET ORAL EVERY 6 HOURS PRN
Qty: 28 TABLET | Refills: 0 | Status: SHIPPED | OUTPATIENT
Start: 2023-02-25 | End: 2023-03-04

## 2023-02-25 RX ORDER — NORTRIPTYLINE HYDROCHLORIDE 25 MG/1
25 CAPSULE ORAL NIGHTLY
Status: DISCONTINUED | OUTPATIENT
Start: 2023-02-25 | End: 2023-02-25 | Stop reason: HOSPADM

## 2023-02-25 RX ORDER — METHOCARBAMOL 500 MG/1
500 TABLET, FILM COATED ORAL 4 TIMES DAILY
Qty: 40 TABLET | Refills: 0 | Status: SHIPPED | OUTPATIENT
Start: 2023-02-25 | End: 2023-03-07

## 2023-02-25 RX ORDER — OXYBUTYNIN CHLORIDE 10 MG/1
20 TABLET, EXTENDED RELEASE ORAL NIGHTLY
Status: DISCONTINUED | OUTPATIENT
Start: 2023-02-25 | End: 2023-02-25 | Stop reason: HOSPADM

## 2023-02-25 RX ORDER — LISINOPRIL 10 MG/1
10 TABLET ORAL DAILY
Status: DISCONTINUED | OUTPATIENT
Start: 2023-02-25 | End: 2023-02-25 | Stop reason: HOSPADM

## 2023-02-25 RX ADMIN — SENNOSIDES AND DOCUSATE SODIUM 1 TABLET: 50; 8.6 TABLET ORAL at 08:36

## 2023-02-25 RX ADMIN — ACETAMINOPHEN 1000 MG: 500 TABLET, FILM COATED ORAL at 14:24

## 2023-02-25 RX ADMIN — METHOCARBAMOL 500 MG: 500 TABLET ORAL at 17:53

## 2023-02-25 RX ADMIN — METHOCARBAMOL 500 MG: 500 TABLET ORAL at 14:24

## 2023-02-25 RX ADMIN — ASPIRIN 81 MG 81 MG: 81 TABLET ORAL at 08:36

## 2023-02-25 RX ADMIN — OXYCODONE HYDROCHLORIDE 5 MG: 5 TABLET ORAL at 11:26

## 2023-02-25 RX ADMIN — NITROFURANTOIN MONOHYDRATE/MACROCRYSTALLINE 100 MG: 25; 75 CAPSULE ORAL at 08:36

## 2023-02-25 RX ADMIN — CLOTRIMAZOLE: 10 CREAM TOPICAL at 08:38

## 2023-02-25 RX ADMIN — METHOCARBAMOL 500 MG: 500 TABLET ORAL at 20:20

## 2023-02-25 RX ADMIN — DULOXETINE HYDROCHLORIDE 60 MG: 30 CAPSULE, DELAYED RELEASE ORAL at 08:36

## 2023-02-25 RX ADMIN — APIXABAN 5 MG: 5 TABLET, FILM COATED ORAL at 20:20

## 2023-02-25 RX ADMIN — LISINOPRIL 10 MG: 10 TABLET ORAL at 11:23

## 2023-02-25 RX ADMIN — SODIUM CHLORIDE, PRESERVATIVE FREE 10 ML: 5 INJECTION INTRAVENOUS at 08:36

## 2023-02-25 RX ADMIN — ROPINIROLE HYDROCHLORIDE 2 MG: 2 TABLET, FILM COATED ORAL at 20:19

## 2023-02-25 RX ADMIN — APIXABAN 5 MG: 5 TABLET, FILM COATED ORAL at 08:35

## 2023-02-25 RX ADMIN — PANTOPRAZOLE SODIUM 40 MG: 40 TABLET, DELAYED RELEASE ORAL at 06:18

## 2023-02-25 RX ADMIN — RISPERIDONE 0.25 MG: 0.25 TABLET, FILM COATED ORAL at 20:19

## 2023-02-25 RX ADMIN — OXYBUTYNIN CHLORIDE 20 MG: 10 TABLET, EXTENDED RELEASE ORAL at 20:19

## 2023-02-25 RX ADMIN — FERROUS SULFATE TAB 325 MG (65 MG ELEMENTAL FE) 325 MG: 325 (65 FE) TAB at 11:23

## 2023-02-25 RX ADMIN — ACETAMINOPHEN 1000 MG: 500 TABLET, FILM COATED ORAL at 06:18

## 2023-02-25 RX ADMIN — NORTRIPTYLINE HYDROCHLORIDE 25 MG: 25 CAPSULE ORAL at 20:19

## 2023-02-25 RX ADMIN — RISPERIDONE 0.25 MG: 0.25 TABLET, FILM COATED ORAL at 08:36

## 2023-02-25 RX ADMIN — OXYCODONE HYDROCHLORIDE 10 MG: 10 TABLET ORAL at 21:09

## 2023-02-25 RX ADMIN — TEMAZEPAM 15 MG: 15 CAPSULE ORAL at 21:09

## 2023-02-25 RX ADMIN — METHOCARBAMOL 500 MG: 500 TABLET ORAL at 08:36

## 2023-02-25 RX ADMIN — SENNOSIDES AND DOCUSATE SODIUM 1 TABLET: 50; 8.6 TABLET ORAL at 20:20

## 2023-02-25 RX ADMIN — NITROFURANTOIN MONOHYDRATE/MACROCRYSTALLINE 100 MG: 25; 75 CAPSULE ORAL at 20:20

## 2023-02-25 ASSESSMENT — PAIN DESCRIPTION - ORIENTATION
ORIENTATION: LEFT

## 2023-02-25 ASSESSMENT — ENCOUNTER SYMPTOMS
RESPIRATORY NEGATIVE: 1
NAUSEA: 0
BACK PAIN: 1
DIARRHEA: 0
ALLERGIC/IMMUNOLOGIC NEGATIVE: 1
VOMITING: 0
SHORTNESS OF BREATH: 0
COUGH: 0
ABDOMINAL DISTENTION: 0
ANAL BLEEDING: 0
BLOOD IN STOOL: 0
GASTROINTESTINAL NEGATIVE: 1
CONSTIPATION: 0
ABDOMINAL PAIN: 0
EYES NEGATIVE: 1

## 2023-02-25 ASSESSMENT — PAIN SCALES - GENERAL
PAINLEVEL_OUTOF10: 0
PAINLEVEL_OUTOF10: 0
PAINLEVEL_OUTOF10: 9
PAINLEVEL_OUTOF10: 0
PAINLEVEL_OUTOF10: 7
PAINLEVEL_OUTOF10: 0
PAINLEVEL_OUTOF10: 6
PAINLEVEL_OUTOF10: 6
PAINLEVEL_OUTOF10: 5
PAINLEVEL_OUTOF10: 6

## 2023-02-25 ASSESSMENT — PAIN DESCRIPTION - LOCATION
LOCATION: LEG;FOOT
LOCATION: LEG

## 2023-02-25 ASSESSMENT — PAIN DESCRIPTION - DESCRIPTORS
DESCRIPTORS: ACHING;DISCOMFORT;SORE
DESCRIPTORS: ACHING;CRAMPING;DISCOMFORT
DESCRIPTORS: ACHING;DISCOMFORT;SORE

## 2023-02-25 NOTE — PROGRESS NOTES
Records from patient's home nursing facility with updated medicine reconciliation as follows:    Aspirin 81  Duloxetine 60 mg  Famotidine 40mg  Iron 325  Lisinopril 10mg  Nortriptyline 25mg nightly  Oxybutynin 20 mg total nightly  Protonix 40 twice daily  K+ 10mg daily  Risperdal 0.25mg twice daily  Requip 2 mg nightly  Restoril 15 nightly    Dr. Ivonne Jaquez

## 2023-02-25 NOTE — PROGRESS NOTES
[facility] and [address] [facility] Electrophysiology  Inpatient progress note   PATIENT: Valdo Donahue  MEDICAL RECORD NUMBER: [id number]  DATE OF SERVICE:  2/25/2023  ATTENDING ELECTROPHYSIOLOGIST: Martín Koroma MD  PRIMARY ELECTROPHYSIOLOGIST: Martín Koroma MD  REFERRING PHYSICIAN:   CHIEF COMPLAINT: Rudell Dago down    HPI: This is a 67 y.o. female with a history of   Patient Active Problem List   Diagnosis    Essential hypertension    Osteoarthritis    Anxiety and depression    Anemia    Type 2 diabetes mellitus without complication ([address]    Fall from ground level    Atrial fibrillation ([facility] [address]    Intraparenchymal hemorrhage of brain ([facility] [address]    Gait instability   who presented to the hospital on 2/21/23 complaining of tripped and fell down. The patient has history of hypertension, diabetes mellitus, obesity, anxiety and depression. The patient presented to the hospital after tripped her slipper and fell down hit the head on the door. She states that she had chronic knee pains but hurt more after fall. She went to [facility] and was found to have possible right parietal intracranial hemorrhage so she was transferred to [facility]. She was seen by Neurosurgery and CT head was thought to be calcified AVM. She is scheduled for brain MRI today. Her initial EKG showed AF with HR 85 bpm. She denies any previous cardiac history. While she is on telemetry showed was noted to have HR of 50-60 bpm with 2.36 second pause at 12.43 PM of 2/22/23. She reports history of vertigo. The patient denies any chest pain, dyspnea, palpitations, syncope, orthopnea or paroxysmal nocturnal dyspnea. Cardiac electrophysiology service is consulted for cardiac pauses. 2/23/2023: Patient lying in bed in no acute distress. She did not have MRI yesterday and is waiting for her MRI to be done today. She continues in atrial fibrillation with slow ventricular rate but no significant pauses on the monitor.   She denies any lightheaded or dizziness laying in bed. She did have some lightheadedness getting up out of bed but this resolved with rest.    2/24/2023: Patient lying in bed no acute distress. She denies any palpitations, lightheadedness, chest pain. She does have bradycardia episodes at rest but heart rate does increase with activity. MRI done yesterday showed no bleed. Awaiting neurosurgical clearance to start Eliquis today. Discussed with patient and she understands anticoagulation initiation and reasoning.    2/25/23: Feeling well. Denies any dizziness or syncope. Remains in AF with HR 45-55 bpm. No significant pause seen overnight.     Patient Active Problem List    Diagnosis Date Noted    Intraparenchymal hemorrhage of brain ([facility] [address] 02/24/2023     Priority: Medium    Gait instability 02/24/2023     Priority: Medium    Atrial fibrillation ([facility] [address] 02/22/2023     Priority: Medium    Fall from ground level 02/21/2023     Priority: Medium    Type 2 diabetes mellitus without complication ([facility] [address] 20/54/2051    Essential hypertension 07/14/2014    Osteoarthritis 07/14/2014    Anxiety and depression 07/14/2014    Anemia 07/14/2014       Past Medical History:   Diagnosis Date    Anemia 2000    Anxiety     Depression     Hypertension     Osteoarthritis        Family History   Problem Relation Age of Onset    Diabetes Mother     Dementia Mother     Diabetes Sister     Diabetes Son        Social History     Tobacco Use    Smoking status: Passive Smoke Exposure - Never Smoker    Smokeless tobacco: Never   Substance Use Topics    Alcohol use: No       Current Facility-Administered Medications   Medication Dose Route Frequency Provider Last Rate Last Admin    nortriptyline (PAMELOR) capsule 25 mg  25 mg Oral Nightly Dixon Castanon,         oxybutynin (DITROPAN-XL) extended release tablet 20 mg  20 mg Oral Nightly Dixon Rojasin, DO        lisinopril (PRINIVIL;ZESTRIL) tablet 10 mg  10 mg Oral Daily Iza Adame DO ferrous sulfate (IRON 325) tablet 325 mg  325 mg Oral Daily with breakfast [place] Law, DO        apixaban (ELIQUIS) tablet 5 mg  5 mg Oral BID Kenneth Verma MD   5 mg at 02/25/23 3532    aspirin chewable tablet 81 mg  81 mg Oral Daily Kenneth Verma MD   81 mg at 02/25/23 0836    DULoxetine (CYMBALTA) extended release capsule 60 mg  60 mg Oral Daily Kenneth Verma MD   60 mg at 02/25/23 0836    rOPINIRole (REQUIP) tablet 2 mg  2 mg Oral Nightly Kenneth Verma MD   2 mg at 02/24/23 2129    temazepam (RESTORIL) capsule 15 mg  15 mg Oral Nightly PRN Kenneth Verma MD        risperiDONE (RISPERDAL) tablet 0.25 mg  0.25 mg Oral BID Kenneth Verma MD   0.25 mg at 02/25/23 0836    pantoprazole (PROTONIX) tablet 40 mg  40 mg Oral QAM AC Kenneth Verma MD   40 mg at 02/25/23 0618    zinc oxide 20 % ointment   Topical 4x Daily PRN [place] Law, DO   Given at 02/22/23 2033    clotrimazole (LOTRIMIN) 1 % cream   Topical BID [place] Law, DO   Given at 02/25/23 4780    nitrofurantoin (macrocrystal-monohydrate) (MACROBID) capsule 100 mg  100 mg Oral 2 times per day [place] Law, DO   100 mg at 02/25/23 0836    hydrALAZINE (APRESOLINE) injection 10 mg  10 mg IntraVENous Q1H PRN [place] Law, DO        labetalol (NORMODYNE;TRANDATE) injection 10 mg  10 mg IntraVENous Q30 Min PRN Dixon Straffin, DO        sodium chloride flush 0.9 % injection 5-40 mL  5-40 mL IntraVENous 2 times per day [place] Law, DO   10 mL at 02/25/23 0836    sodium chloride flush 0.9 % injection 5-40 mL  5-40 mL IntraVENous PRN Dixon Straffin, DO        0.9 % sodium chloride infusion   IntraVENous PRN Dixon Straffin, DO        ondansetron (ZOFRAN-ODT) disintegrating tablet 4 mg  4 mg Oral Q8H PRN [place] Law, DO        Or    ondansetron (ZOFRAN) injection 4 mg  4 mg IntraVENous Q6H PRN Dixon Castanon DO        sennosides-docusate sodium (SENOKOT-S) 8.6-50 MG tablet 1 tablet  1 tablet Oral BID [place] Cuenca, DO   1 tablet at 02/25/23 0836    polyethylene glycol (GLYCOLAX) packet 17 g  17 g Oral Daily PRN [place] Cuenca, DO        acetaminophen (TYLENOL) tablet 1,000 mg  1,000 mg Oral 3 times per day [place] Cuenca, DO   1,000 mg at 02/25/23 6911    oxyCODONE (ROXICODONE) immediate release tablet 5 mg  5 mg Oral Q4H PRN [place] Cuenca, DO   5 mg at 02/24/23 2127    Or    oxyCODONE HCl (OXY-IR) immediate release tablet 10 mg  10 mg Oral Q4H PRN [place] Cuenca, DO   10 mg at 02/23/23 2108    methocarbamol (ROBAXIN) tablet 500 mg  500 mg Oral 4x Daily [place] Cuenca, DO   500 mg at 02/25/23 0836    glucose chewable tablet 16 g  4 tablet Oral PRN [place] Cuenca, DO        dextrose bolus 10% 125 mL  125 mL IntraVENous PRN [place] Cuenca, DO        Or    dextrose bolus 10% 250 mL  250 mL IntraVENous PRN Dixon Straffin, DO        glucagon injection 1 mg  1 mg SubCUTAneous PRN Dixon Straffin, DO        dextrose 10 % infusion   IntraVENous Continuous PRN [place] Cuenca, DO            Allergies   Allergen Reactions    Ambien [Zolpidem Tartrate]      Sleep walking    Bactrim [Sulfamethoxazole-Trimethoprim] Rash     ROS:   Constitutional: Negative for fever. Positive for activity change and negative for appetite change. HENT: Negative for epistaxis. Eyes: Negative for diploplia, blurred vision. Respiratory: Negative for cough, chest tightness, shortness of breath and wheezing. Cardiovascular: pertinent positives in HPI  Gastrointestinal: Negative for abdominal pain and blood in stool.    All other review of systems are negative     PHYSICAL EXAM:   Vitals:    02/24/23 2345 02/25/23 0300 02/25/23 0727 02/25/23 1005   BP: (!) 110/56 (!) 124/58 (!) 149/65    Pulse: (!) 47 (!) 48 (!) 48    Resp: 16 16 16    Temp: (!) 95.9 °F (35.5 °C) (!) 96.1 °F (35.6 °C)  97.8 °F (36.6 °C)   TempSrc: Temporal Temporal Temporal Oral   SpO2: 92% 93% 96%    Weight:       Height:          Constitutional: Well-developed, no acute distress  Eyes: conjunctivae normal, no xanthelasma   Ears, Nose, Throat: oral mucosa moist, no cyanosis   CV: no JVD. Bradycardic. Irregular rate and rhythm. No murmur, rubs, or gallops. PMI is nondisplaced  Lungs: clear to auscultation bilaterally, normal respiratory effort without used of accessory muscles  Abdomen: soft, non-tender, bowel sounds present  Extremities: No edema  Skin: warm, no rashes     I have personally reviewed the laboratory, cardiac diagnostic and radiographic testing as outlined below:    Data:    Recent Labs     02/23/23 0419 02/24/23 0655 02/25/23  0605   WBC 6.4 4.8 4.8   HGB 11.5 12.2 12.0   HCT 35.4 38.3 37.4    142 149     Recent Labs     02/23/23 0419 02/24/23 0655 02/25/23  0605    135 137   K 4.1 4.2 4.2    104 103   CO2 25 25 25   BUN 19 19 19   CREATININE 0.9 0.8 0.8   CALCIUM 8.2* 8.2* 8.3*      No results found for: MG  No results for input(s): TSH in the last 72 hours. No results for input(s): INR in the last 72 hours. CXR 2/21/23:   FINDINGS:   Normal cardiomediastinal silhouette. Lungs clear. No pneumothorax or   effusion. Osseous thorax intact. Impression   No acute disease. RECOMMENDATION:   Careful clinical correlation and follow up recommended. Telemetry 02/25/23 : AF 45-55 bpm, no significant pause . EKG 2/21/23: AF 85 bpm, low voltage QRS, old anteroseptal MI, Qtc 454 ms. Please see scan in Cardiology. Echocardiogram 2/23/23:   Findings      Left Ventricle   Left ventricle is normal in size . Mild left ventricular concentric hypertrophy noted. Micro-bubble contrast injected to enhance left ventricular visualization. No regional wall motion abnormalities seen. Normal left ventricular ejection fraction. Ejection fraction is visually estimated at 65%. Indeterminate diastolic function. Right Ventricle   Normal right ventricular size and function.       Left Atrium   The left atrium is moderately dilated. Interatrial septum appears intact. Right Atrium   Mildly enlarged right atrium size. Mitral Valve   Mild thickening of the mitral valve leaflets. Tricuspid Valve   The tricuspid valve appears structurally normal.   Mild tricuspid regurgitation. RVSP is 42 mmHg. Pulmonary hypertension is mild . Aortic Valve   The aortic valve appears mildly sclerotic. Mild aortic regurgitation is noted. Pulmonic Valve   Pulmonic valve is structurally normal.   Physiologic and/or trace pulmonic regurgitation present. Pericardial Effusion   No evidence of pericardial effusion. Aorta   Aortic root dimension within normal limits. Dilation of the ascending aorta(3.8cm). Conclusions      Summary   Left ventricle is normal in size . Mild left ventricular concentric hypertrophy noted. No regional wall motion abnormalities seen. Normal left ventricular ejection fraction. The left atrium is moderately dilated. Mildly enlarged right atrium size. Mild thickening of the mitral valve leaflets. The aortic valve appears mildly sclerotic. Mild aortic regurgitation is noted. The tricuspid valve appears structurally normal.   Mild tricuspid regurgitation. Pulmonary hypertension is mild . Dilation of the ascending aort). Signature      ----------------------------------------------------------------   Electronically signed by Juliann Turcios MD(Interpreting   physician) on 02/23/2023 04:57 PM   ----------------------------------------------------------------    I have independently reviewed all of the ECGs and rhythm strips per above     Assessment/Plan:  This is a 67 y.o. female with a history of   Patient Active Problem List   Diagnosis    Essential hypertension    Osteoarthritis    Anxiety and depression    Anemia    Type 2 diabetes mellitus without complication ([address]    Fall from ground level    Atrial fibrillation ([facility] [address]    Intraparenchymal hemorrhage of brain ([facility] [address] Gait instability    who presents with atrial fibrillation. 1. Newly diagnosed atrial fibrillation  - Diagnosed 2/21/23 on EKG. - Likely persistent AF versus long standing persistent AF.  - Asymptomatic.  - [id number] of 4- not on [address] yet, pending evaluation of MRI of the brain to rule out bleed  - Not on any AV node blocker agent or OAC at home.  - TTE 2/23/23 showed normal LV EF, moderate LAE and mild JADEN. - Presents in AF with SVR. 2. Hypertension  - On Zestril. 3. Diabetes mellitus    4. 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 5. Anxiety and depression  - On Pamelor, Risperdal and Restoril. 6. Mechanical fall    7. Anemia  - On Ferrous sulfate. 8. Probable BRE    Recommendations:  Continue Eliquis 5 mg BID. No plan for rhythm control treatment. No indication for pacing therapy. Sleep study to exclude BRE as an OPD basis. OK to discharge home on 2 weeks ZIO XT cardiac monitor to monitor and follow up as outpatient. Thank you for allowing me to participate in your patient's care. Please call me if there are any questions or concerns. I have spent a total of 50 minutes with the patient and the family reviewing the above stated recommendations. And a total of >50% of that time involved face-to-face time providing counseling and/or coordination of care with the other providers, reviewing records/tests, counseling/education of the patient, ordering medications/tests/procedures, coordinating care, and documenting clinical information in the EHR.      Karuna Hyatt MD  Cardiac Electrophysiology  [facility]  [facility] [place]: [place] [facility]  11:09 AM  2/25/2023

## 2023-02-25 NOTE — PROGRESS NOTES
Neurosurg progress note  VITALS:  BP (!) 149/65   Pulse (!) 48   Temp (!) 95.9 °F (35.5 °C) (Temporal)   Resp 16   Ht 5' 2\" (1.575 m)   Wt 234 lb (106.1 kg)   SpO2 96%   BMI 42.80 kg/m²   24HR INTAKE/OUTPUT:    Intake/Output Summary (Last 24 hours) at 2/25/2023 0957  Last data filed at 2/25/2023 0618  Gross per 24 hour   Intake 780 ml   Output 800 ml   Net -20 ml     XR PELVIS (1-2 VIEWS)    Result Date: 2/21/2023  EXAMINATION: ONE XRAY VIEW OF THE PELVIS 2/21/2023 3:03 am COMPARISON: None. HISTORY: ORDERING SYSTEM PROVIDED HISTORY: Trauma fall TECHNOLOGIST PROVIDED HISTORY: Reason for exam:->trauma fall What reading provider will be dictating this exam?->CRC FINDINGS: Abnormal appearance bilateral femoral necks concerning for fracture at either site. Significant overlying soft tissue obscures cortical detail. Question minimally displaced fractures right superior and inferior pubic rami. Soft tissues unremarkable. Proximal intramedullary fixation left femur partially observed. Abnormal appearance bilateral femoral necks concerning for fracture at either site. Question minimally displaced fractures right superior and inferior pubic rami. Significant overlying soft tissue obscures cortical detail. Consider CT evaluation. RECOMMENDATION: Careful clinical correlation and follow up recommended. XR KNEE LEFT (1-2 VIEWS)    Result Date: 2/21/2023  EXAMINATION: TWO XRAY VIEWS OF THE LEFT KNEE 2/21/2023 2:01 am COMPARISON: None. HISTORY: ORDERING SYSTEM PROVIDED HISTORY: pain TECHNOLOGIST PROVIDED HISTORY: Reason for exam:->pain What reading provider will be dictating this exam?->CRC FINDINGS: No fracture, dislocation or osseous lesion. Total knee arthroplasty intact. Distal femoral intramedullary fixation intact. No effusion. Soft tissues unremarkable. No acute disease. RECOMMENDATION: Careful clinical correlation and follow up recommended.      XR KNEE RIGHT (1-2 VIEWS)    Result Date: 2/21/2023  EXAMINATION: TWO XRAY VIEWS OF THE RIGHT KNEE 2/21/2023 5:59 am COMPARISON: None. HISTORY: ORDERING SYSTEM PROVIDED HISTORY: knee pain after fall TECHNOLOGIST PROVIDED HISTORY: Please include AP/lateral Reason for exam:->knee pain after fall What reading provider will be dictating this exam?->CRC FINDINGS: No fracture, dislocation or osseous lesion. Total knee arthroplasty. Distal femoral ORIF partially visualized. All hardware intact. No effusion. Soft tissues unremarkable. No acute disease. RECOMMENDATION: Careful clinical correlation and follow up recommended. CT HEAD WO CONTRAST    Result Date: 2/21/2023  EXAMINATION: CT OF THE HEAD WITHOUT CONTRAST  2/21/2023 3:23 am TECHNIQUE: CT of the head was performed without the administration of intravenous contrast. Automated exposure control, iterative reconstruction, and/or weight based adjustment of the mA/kV was utilized to reduce the radiation dose to as low as reasonably achievable. COMPARISON: None available. HISTORY: ORDERING SYSTEM PROVIDED HISTORY: questionable ICH TECHNOLOGIST PROVIDED HISTORY: Reason for exam:->questionable ICH Has a \"code stroke\" or \"stroke alert\" been called? ->No Decision Support Exception - unselect if not a suspected or confirmed emergency medical condition->Emergency Medical Condition (MA) What reading provider will be dictating this exam?->CRC FINDINGS: BRAIN/VENTRICLES: 18 mm hyperdensity within the right parietal deep white matter suggesting hemorrhage versus mass versus dystrophic calcification. Prior imaging not available on this database for direct comparison. No significant mass effect or surrounding edema. No abnormal extra-axial fluid collection. The gray-white differentiation is maintained without evidence of an acute infarct. There is no evidence of hydrocephalus. ORBITS: The visualized portion of the orbits demonstrate no acute abnormality.  SINUSES: The visualized paranasal sinuses and mastoid air cells demonstrate no acute abnormality. SOFT TISSUES/SKULL:  No acute abnormality of the visualized skull or soft tissues. 18 mm hyperdensity within the right parietal deep white matter suggesting hemorrhage versus mass. Prior imaging not available on this database for direct comparison. No significant mass effect or surrounding edema. RECOMMENDATIONS: Careful clinical correlation and follow up recommended. MRI evaluation recommended. XR CHEST PORTABLE    Result Date: 2/21/2023  EXAMINATION: ONE XRAY VIEW OF THE CHEST 2/21/2023 3:03 am COMPARISON: None. HISTORY: ORDERING SYSTEM PROVIDED HISTORY: Trauma fall TECHNOLOGIST PROVIDED HISTORY: Reason for exam:->trauma fall What reading provider will be dictating this exam?->CRC FINDINGS: Normal cardiomediastinal silhouette. Lungs clear. No pneumothorax or effusion. Osseous thorax intact. No acute disease. RECOMMENDATION: Careful clinical correlation and follow up recommended. CT FEMUR LEFT WO CONTRAST    Result Date: 2/21/2023  EXAMINATION: CT OF THE LEFT FEMUR WITHOUT CONTRAST 2/21/2023 7:01 am TECHNIQUE: CT of the left femur was performed without the administration of intravenous contrast.  Multiplanar reformatted images are provided for review. Automated exposure control, iterative reconstruction, and/or weight based adjustment of the mA/kV was utilized to reduce the radiation dose to as low as reasonably achievable. COMPARISON: AP pelvis, left hip, left femur x-ray exams 02/21/2023 HISTORY ORDERING SYSTEM PROVIDED HISTORY: left distal femur pain after fall, TKA and retrograde nail in place TECHNOLOGIST PROVIDED HISTORY: Reason for exam:->left distal femur pain after fall, TKA and retrograde nail in place What reading provider will be dictating this exam?->CRC FINDINGS: No dislocation or acute fracture. Remote, healed fracture of the distal left femur transfixed with a long intramedullary nail and with proximal and distal locking screws.   Left total knee arthroplasty and no complication visualized. Some unavoidable CT reconstruction artifact related to surgical hardware. The fixation hardware appears intact and without loosening. Intact proximal femur, acetabulum, and pubic rami on the left. Mild osteoarthritis of the left hip. The pubic symphysis and left SI joint are not widened. The right hemipelvis was not imaged. No soft tissue hematoma or suspicious fluid collections. No free pelvic fluid identified and the left pelvic sidewall appears intact. 1.  No dislocation or recent fracture. Remote, healed fracture of the distal left femur. No soft tissue hematoma or free pelvic fluid. 2.  Mild osteoarthritis, left femoroacetabular joint. RECOMMENDATIONS: Unavailable     XR HIP 2-3 VW W PELVIS LEFT    Result Date: 2/21/2023  EXAMINATION: ONE XRAY VIEW OF THE PELVIS AND TWO XRAY VIEWS LEFT HIP 2/21/2023 5:58 am COMPARISON: 2 hours prior. HISTORY: ORDERING SYSTEM PROVIDED HISTORY: left hip pain, fall TECHNOLOGIST PROVIDED HISTORY: Reason for exam:->left hip pain, fall What reading provider will be dictating this exam?->CRC FINDINGS: Osteopenia. No visible fracture. Right pubic ramus appears intact on the submitted images. ORIF hardware bilateral femurs partially visualized. Soft tissues unremarkable. No visible fracture. RECOMMENDATION: Careful clinical correlation and follow up recommended.      CBC:   Lab Results   Component Value Date/Time    WBC 4.8 02/25/2023 06:05 AM    RBC 4.20 02/25/2023 06:05 AM    HGB 12.0 02/25/2023 06:05 AM    HCT 37.4 02/25/2023 06:05 AM    MCV 89.0 02/25/2023 06:05 AM    MCH 28.6 02/25/2023 06:05 AM    MCHC 32.1 02/25/2023 06:05 AM    RDW 13.2 02/25/2023 06:05 AM     02/25/2023 06:05 AM    MPV 10.0 02/25/2023 06:05 AM     BMP:    Lab Results   Component Value Date/Time     02/25/2023 06:05 AM    K 4.2 02/25/2023 06:05 AM    K 4.5 02/22/2023 05:59 AM     02/25/2023 06:05 AM    CO2 25 02/25/2023 06:05 AM    BUN 19 02/25/2023 06:05 AM    LABALBU 3.0 02/21/2023 12:04 AM    CREATININE 0.8 02/25/2023 06:05 AM    CALCIUM 8.3 02/25/2023 06:05 AM    GFRAA >60 01/21/2019 12:00 PM    LABGLOM >60 02/25/2023 06:05 AM    GLUCOSE 102 02/25/2023 06:05 AM      nortriptyline  25 mg Oral Nightly    oxybutynin  20 mg Oral Nightly    lisinopril  10 mg Oral Daily    ferrous sulfate  325 mg Oral Daily with breakfast    apixaban  5 mg Oral BID    aspirin  81 mg Oral Daily    DULoxetine  60 mg Oral Daily    rOPINIRole  2 mg Oral Nightly    risperiDONE  0.25 mg Oral BID    pantoprazole  40 mg Oral QAM AC    clotrimazole   Topical BID    nitrofurantoin (macrocrystal-monohydrate)  100 mg Oral 2 times per day    sodium chloride flush  5-40 mL IntraVENous 2 times per day    sennosides-docusate sodium  1 tablet Oral BID    acetaminophen  1,000 mg Oral 3 times per day    methocarbamol  500 mg Oral 4x Daily     Remains awake alert oriented friendly cooperative no focal neurologic deficits  Assessment:  Patient Active Problem List   Diagnosis    Essential hypertension    Osteoarthritis    Anxiety and depression    Anemia    Type 2 diabetes mellitus without complication ([address]    Fall from ground level    Atrial fibrillation ([address]    Intraparenchymal hemorrhage of brain ([address]    Gait instability     Plan: Okay to discharge to acute inpatient rehab from neurosurgery standpoint  Katie Ernandez MD M.D.

## 2023-02-25 NOTE — PROGRESS NOTES
Report called to Aarti at [facility], all questions answered. Updated on pt pick-up time.      IV and heart monitor removed    Heather Madera RN

## 2023-02-25 NOTE — PLAN OF CARE
Problem: Chronic Conditions and Co-morbidities  Goal: Patient's chronic conditions and co-morbidity symptoms are monitored and maintained or improved  2/24/2023 1917 by Willy White RN  Outcome: Progressing  2/24/2023 1247 by Aba Alejandra RN  Outcome: Progressing     Problem: Discharge Planning  Goal: Discharge to home or other facility with appropriate resources  2/24/2023 1917 by Willy White RN  Outcome: Progressing  2/24/2023 1247 by Aba Alejandra RN  Outcome: Progressing     Problem: Pain  Goal: Verbalizes/displays adequate comfort level or baseline comfort level  Outcome: Progressing     Problem: Safety - Adult  Goal: Free from fall injury  2/24/2023 1917 by Willy White RN  Outcome: Progressing  2/24/2023 1247 by Aba Alejandra RN  Outcome: Progressing     Problem: ABCDS Injury Assessment  Goal: Absence of physical injury  Outcome: Progressing     Problem: Skin/Tissue Integrity  Goal: Absence of new skin breakdown  Description: 1. Monitor for areas of redness and/or skin breakdown  2. Assess vascular access sites hourly  3. Every 4-6 hours minimum:  Change oxygen saturation probe site  4. Every 4-6 hours:  If on nasal continuous positive airway pressure, respiratory therapy assess nares and determine need for appliance change or resting period.   Outcome: Progressing

## 2023-02-25 NOTE — PATIENT CARE CONFERENCE
[facility] Quality Flow/Interdisciplinary Rounds Progress Note        Quality Flow Rounds held on February 25, 2023    Disciplines Attending:  Bedside Nurse, , , and Nursing Unit Leadership    Lalita Cifuentes was admitted on 2/21/2023 12:21 AM    Anticipated Discharge Date:       Disposition:    Jerzy Score:  Jerzy Scale Score: 15    Readmission Risk              Risk of Unplanned Readmission:  20           Discussed patient goal for the day, patient clinical progression, and barriers to discharge.   The following Goal(s) of the Day/Commitment(s) have been identified:  discharge [address] Joey Berg RN  February 25, 2023
P Quality Flow/Interdisciplinary Rounds Progress Note        Quality Flow Rounds held on February 22, 2023    Disciplines Attending:  Bedside Nurse, , , and Nursing Unit Leadership    Ramírez Sorto was admitted on 2/21/2023 12:21 AM    Anticipated Discharge Date:       Disposition:    Jerzy Score:  Jerzy Scale Score: 18    Readmission Risk              Risk of Unplanned Readmission:  14           Discussed patient goal for the day, patient clinical progression, and barriers to discharge.   The following Goal(s) of the Day/Commitment(s) have been identified:  Diagnostics - Report Results      Jose Bain RN  February 22, 2023
P Quality Flow/Interdisciplinary Rounds Progress Note        Quality Flow Rounds held on February 24, 2023    Disciplines Attending:  Bedside Nurse, , , and Nursing Unit Leadership    Camilo Hurley was admitted on 2/21/2023 12:21 AM    Anticipated Discharge Date:       Disposition:    Jerzy Score:  Jerzy Scale Score: 15    Readmission Risk              Risk of Unplanned Readmission:  17           Discussed patient goal for the day, patient clinical progression, and barriers to discharge.   The following Goal(s) of the Day/Commitment(s) have been identified:  Diagnostics - Report Results      Christina Montero RN  February 24, 2023
P Quality Flow/Interdisciplinary Rounds Progress Note        Quality Flow Rounds held on February 23, 2023    Disciplines Attending:  Bedside Nurse, , and     Monica Santa was admitted on 2/21/2023 12:21 AM    Anticipated Discharge Date:       Disposition:    Jerzy Score:  Jerzy Scale Score: 15    Readmission Risk              Risk of Unplanned Readmission:  17           Discussed patient goal for the day, patient clinical progression, and barriers to discharge.   The following Goal(s) of the Day/Commitment(s) have been identified:  Diagnostics - Report Results           Reji Mcfarlane RN  February 23, 2023

## 2023-02-25 NOTE — PROGRESS NOTES
[facility]  PROGRESS NOTE  ATTENDING NOTE        TRAUMA  MECHANISM:  GLF    Chief Complaint   Patient presents with    Fall     Transfer from [place]. Saad Colon at home, hit head, left parietal bleed       HPI  The patient is a 66 y/o female who sustained a mechanical fall yesterday. She did hit her head. She ambulates with a wheeled walker. She is morbidly obese. The patient reported  acute, constant  sharp pain localized to the head that started immediately. The intensity of the pain is 3/10. Pain does not radiate. There are no alleviating or worsening factors regarding the pain. The patient was transported by EMS to the [place] [address] [address] 1 [facility] from [facility].   Evaluation prior to arrival included: CT head. Treatment prior to arrival included: none. A trauma consult was requested to assist, guide,  and expedite further evaluation and treatment for the patient. Patient Active Problem List   Diagnosis    Essential hypertension    Osteoarthritis    Anxiety and depression    Anemia    Type 2 diabetes mellitus without complication ([address]    Fall from ground level    Atrial fibrillation ([address]    Intraparenchymal hemorrhage of brain ([address]    Gait instability       SUBJECTIVE/OVERNIGHT EVENTS:  Bradycardia overnight    Review of Systems   Constitutional:  Positive for activity change. Negative for appetite change and unexpected weight change. HENT: Negative. Eyes: Negative. Respiratory: Negative. Negative for cough and shortness of breath. Cardiovascular: Negative. Negative for chest pain and leg swelling. Gastrointestinal: Negative. Negative for abdominal distention, abdominal pain, anal bleeding, blood in stool, constipation, diarrhea, nausea and vomiting. Endocrine: Negative. Genitourinary: Negative. Musculoskeletal:  Positive for back pain, gait problem, joint swelling and myalgias. Negative for arthralgias. Skin: Negative. Allergic/Immunologic: Negative. Neurological:  Negative for dizziness, weakness and headaches. Hematological: Negative. Psychiatric/Behavioral: Negative. Negative for confusion, decreased concentration and sleep disturbance. BP (!) 141/58   Pulse (!) 49   Temp 98.1 °F (36.7 °C) (Oral)   Resp 16   Ht 5' 2\" (1.575 m)   Wt 234 lb (106.1 kg)   SpO2 98%   BMI 42.80 kg/m²   Physical Exam  Constitutional:       Appearance: Normal appearance. She is obese. HENT:      Head: Normocephalic and atraumatic. Nose: Nose normal.      Mouth/Throat:      Mouth: Mucous membranes are moist.      Pharynx: Oropharynx is clear. Eyes:      Extraocular Movements: Extraocular movements intact. Pupils: Pupils are equal, round, and reactive to light. Cardiovascular:      Rate and Rhythm: Normal rate and regular rhythm. Pulses: Normal pulses. Heart sounds: Normal heart sounds. Pulmonary:      Effort: Pulmonary effort is normal.      Breath sounds: Normal breath sounds. Abdominal:      General: There is no distension. Palpations: Abdomen is soft. Tenderness: There is no abdominal tenderness. Musculoskeletal:         General: No tenderness or signs of injury. Cervical back: Normal range of motion and neck supple. Skin:     General: Skin is warm and dry. Neurological:      General: No focal deficit present. Mental Status: She is alert and oriented to person, place, and time. Psychiatric:         Mood and Affect: Mood normal.         Behavior: Behavior normal.         Thought Content:  Thought content normal.         Judgment: Judgment normal.       ASSESSMENT/PLAN:   AVM--NSGY following, repeat MRI in 2 years   Gait instability--ambulated with wheeled walker, PT/OT  DM--ISS  Cardiac pauses--EP consult, EKG, echo, Zio patch on discharge, eliquis, ASA, Echo  UTI--macrobid, f/u urine culture--Ecoli  New onset atrial fibrillation--Eliquis   Many psychiatric meds--residents have updated    INCIDENTAL FINDINGS:  none    [facility]:  9/24    DVT/GI ppx:  bilateral SCDs, eliquis/diet    Ok to discharge to [facility]CM note seen from today, that will likely not hear back from facilities until Dena Millan MD, MSc, FACS  2/25/2023  12:07 PM

## 2023-02-25 NOTE — CARE COORDINATION
SOCIAL WORK/CASEMANAGEMENT TRANSITION OF CARE PLANNINGRhys Wolfe, [address]):  pt accepted [facility] today. Pas ambulance  for 7 p.m. pasrr done and all discharge paper work is in place. Pt is in agreement. Rn to run a covid.  BINA Jackson  2/25/2023

## 2023-02-25 NOTE — PLAN OF CARE
Problem: Chronic Conditions and Co-morbidities  Goal: Patient's chronic conditions and co-morbidity symptoms are monitored and maintained or improved  Outcome: Progressing     Problem: Discharge Planning  Goal: Discharge to home or other facility with appropriate resources  Outcome: Progressing     Problem: Pain  Goal: Verbalizes/displays adequate comfort level or baseline comfort level  Outcome: Progressing  Flowsheets (Taken 2/25/2023 0300 by Harlan Buck RN)  Verbalizes/displays adequate comfort level or baseline comfort level: Encourage patient to monitor pain and request assistance     Problem: Safety - Adult  Goal: Free from fall injury  Outcome: Progressing     Problem: ABCDS Injury Assessment  Goal: Absence of physical injury  Outcome: Progressing     Problem: Skin/Tissue Integrity  Goal: Absence of new skin breakdown  Description: 1. Monitor for areas of redness and/or skin breakdown  2. Assess vascular access sites hourly  3. Every 4-6 hours minimum:  Change oxygen saturation probe site  4. Every 4-6 hours:  If on nasal continuous positive airway pressure, respiratory therapy assess nares and determine need for appliance change or resting period.   Outcome: Progressing

## 2023-02-25 NOTE — PLAN OF CARE
Problem: Chronic Conditions and Co-morbidities  Goal: Patient's chronic conditions and co-morbidity symptoms are monitored and maintained or improved  2/24/2023 1950 by Mary Mcallister RN  Outcome: Progressing  2/24/2023 1917 by Emi Badillo RN  Outcome: Progressing  2/24/2023 1247 by Yarelis Mendoza RN  Outcome: Progressing     Problem: Discharge Planning  Goal: Discharge to home or other facility with appropriate resources  2/24/2023 1950 by Mary Mcallister RN  Outcome: Progressing  2/24/2023 1917 by Emi Badillo RN  Outcome: Progressing  2/24/2023 1247 by Yarelis Mendoza RN  Outcome: Progressing     Problem: Pain  Goal: Verbalizes/displays adequate comfort level or baseline comfort level  2/24/2023 1950 by Mary Mcallister RN  Outcome: Progressing  Flowsheets (Taken 2/24/2023 1945)  Verbalizes/displays adequate comfort level or baseline comfort level: Encourage patient to monitor pain and request assistance  2/24/2023 1917 by Emi Badillo RN  Outcome: Progressing

## 2023-02-25 NOTE — CARE COORDINATION
Spoke with patient, she is agreeable to [facility]. Discussed options choiced for 1. [facility], referral left on voicemail, will likely not receive call back until Monday, 2. [facility]. For questions I can be reached at [phone].  Malou Rodriguez [place]

## 2023-02-25 NOTE — PROGRESS NOTES
[facility]  PROGRESS NOTE  ATTENDING NOTE        TRAUMA  MECHANISM:  GLF    Chief Complaint   Patient presents with    Fall     Transfer from [place]. [place]ert[place] at home, hit head, left parietal bleed       HPI  The patient is a 66 y/o female who sustained a mechanical fall yesterday. She did hit her head. She ambulates with a wheeled walker. She is morbidly obese. The patient reported  acute, constant  sharp pain localized to the head that started immediately. The intensity of the pain is 3/10. Pain does not radiate. There are no alleviating or worsening factors regarding the pain. The patient was transported by EMS to the [place] [address] [facility] from [facility].   Evaluation prior to arrival included: CT head. Treatment prior to arrival included: none. A trauma consult was requested to assist, guide,  and expedite further evaluation and treatment for the patient. Patient Active Problem List   Diagnosis    Hypertension    Osteoarthritis    Anxiety and depression    Anemia    Type 2 diabetes mellitus without complication ([address]    Fall from ground level    Atrial fibrillation ([facility] [address]       SUBJECTIVE/OVERNIGHT EVENTS:  Open MRI done--AVM    Review of Systems   Constitutional:  Positive for activity change. Negative for appetite change and unexpected weight change. HENT: Negative. Eyes: Negative. Respiratory: Negative. Negative for cough and shortness of breath. Cardiovascular: Negative. Negative for chest pain and leg swelling. Gastrointestinal: Negative. Negative for abdominal distention, abdominal pain, anal bleeding, blood in stool, constipation, diarrhea, nausea and vomiting. Endocrine: Negative. Genitourinary: Negative. Musculoskeletal:  Positive for back pain, gait problem, joint swelling and myalgias. Negative for arthralgias. Skin: Negative. Allergic/Immunologic: Negative.     Neurological:  Negative for dizziness, weakness and headaches. Hematological: Negative. Psychiatric/Behavioral: Negative. Negative for confusion, decreased concentration and sleep disturbance. /79   Pulse (!) 38   Temp (!) 96.5 °F (35.8 °C) (Temporal)   Resp 18   Ht 5' 2\" (1.575 m)   Wt 234 lb (106.1 kg)   SpO2 97%   BMI 42.80 kg/m²   Physical Exam  Constitutional:       Appearance: Normal appearance. She is obese. HENT:      Head: Normocephalic and atraumatic. Nose: Nose normal.      Mouth/Throat:      Mouth: Mucous membranes are moist.      Pharynx: Oropharynx is clear. Eyes:      Extraocular Movements: Extraocular movements intact. Pupils: Pupils are equal, round, and reactive to light. Cardiovascular:      Rate and Rhythm: Normal rate and regular rhythm. Pulses: Normal pulses. Heart sounds: Normal heart sounds. Pulmonary:      Effort: Pulmonary effort is normal.      Breath sounds: Normal breath sounds. Abdominal:      General: There is no distension. Palpations: Abdomen is soft. Tenderness: There is no abdominal tenderness. Musculoskeletal:         General: No tenderness or signs of injury. Cervical back: Normal range of motion and neck supple. Skin:     General: Skin is warm and dry. Neurological:      General: No focal deficit present. Mental Status: She is alert and oriented to person, place, and time. Psychiatric:         Mood and Affect: Mood normal.         Behavior: Behavior normal.         Thought Content:  Thought content normal.         Judgment: Judgment normal.       ASSESSMENT/PLAN:   AVM--NSGY following, repeat MRI in 2 years   Gait instability--ambulated with wheeled walker, PT/OT  DM--ISS  Cardiac pauses--EP consult, EKG, echo, Zio patch on discharge, eliquis, ASA, Echo  UTI--macrobid, f/u urine culture--Ecoli  New onset atrial fibrillation--Eliquis   Many psychiatric meds--review with patient tomorrow on rounds    INCIDENTAL FINDINGS:  none    [facility]: 9/24    DVT/GI ppx:  bilateral SCDs, lester/diet    Dylan Pittman MD, MSc, [facility] 132  2/24/2023  9:48 PM

## 2023-02-25 NOTE — DISCHARGE INSTR - COC
Continuity of Care Form    Patient Name: Jerod Alvarez   :  1950  MRN:  [id number]    Admit date:  2023  Discharge date:  2023    Code Status Order: Full Code   Advance Directives:     Admitting Physician:  Muna Basilio MD  PCP: No primary care provider on file. Discharging Nurse: CARMITA [facility] Unit/Room#: 5277/1428-R  Discharging Unit Phone Number: [phone]    Emergency Contact:   Extended Emergency Contact Information  Primary Emergency Contact: Lazaro Clark   [address] Phone: [phone]  Relation: Child  Secondary Emergency Contact: Sravan Connell  Relation: Brother/Sister    Past Surgical History:  Past Surgical History:   Procedure Laterality Date    CHOLECYSTECTOMY      GASTRIC BYPASS SURGERY  2000    HERNIA REPAIR      JOINT REPLACEMENT Bilateral 10/2012       Immunization History:   Immunization History   Administered Date(s) Administered    Influenza Vaccine, unspecified formulation 2016    Influenza, High Dose (Fluzone 65 yrs and older) 2017, 2018    Pneumococcal Conjugate 13-valent ([id number]) 2018    Pneumococcal Polysaccharide (Oitigcygr27) 2016    Tdap (Boostrix, Adacel) 10/25/2018       Active Problems:  Patient Active Problem List   Diagnosis Code    Essential hypertension I10    Osteoarthritis M19.90    Anxiety and depression F41.9, F32. A    Anemia D64.9    Type 2 diabetes mellitus without complication (HCC) X24.5    Fall from ground level W18.30XA    Atrial fibrillation (HCC) I48.91    Intraparenchymal hemorrhage of brain (HCC) I61.9    Gait instability R26.81       Isolation/Infection:   Isolation            No Isolation          Patient Infection Status       None to display            Nurse Assessment:  Last Vital Signs: BP (!) 141/58   Pulse (!) 49   Temp 98.1 °F (36.7 °C) (Oral)   Resp 16   Ht 5' 2\" (1.575 m)   Wt 234 lb (106.1 kg)   SpO2 98%   BMI 42.80 kg/m²     Last documented pain score (0-10 scale): Pain Level: 6  Last Weight:   Wt Readings from Last 1 Encounters:   02/21/23 234 lb (106.1 kg)     Mental Status:  oriented, alert, and disoriented at times    IV Access:  - None    Nursing Mobility/ADLs:  Walking   Dependent  Transfer  Dependent  Bathing  Assisted  Dressing  Assisted  Toileting  Assisted  Feeding  [facility] Delivery   whole    Wound Care Documentation and Therapy:        Elimination:  Continence: Bowel: Yes  Bladder: Yes  Urinary Catheter:  external catheter    Colostomy/Ileostomy/Ileal Conduit: No       Date of Last BM: 02/21/2023    Intake/Output Summary (Last 24 hours) at 2/25/2023 1447  Last data filed at 2/25/2023 1345  Gross per 24 hour   Intake 480 ml   Output 800 ml   Net -320 ml     I/O last 3 completed shifts: In: 1140 [P.O.:1140]  Out: 800 [Urine:800]    Safety Concerns:     History of Falls (last 30 days) and At Risk for Falls    Impairments/Disabilities:      None    Nutrition Therapy:  Current Nutrition Therapy:   - Oral Diet:  General and Carb Control 4 carbs/meal (1800kcals/day)    Routes of Feeding: Oral  Liquids: No Restrictions  Daily Fluid Restriction: no  Last Modified Barium Swallow with Video (Video Swallowing Test): not done    Treatments at the Time of Hospital Discharge:   Respiratory Treatments: none  Oxygen Therapy:  is not on home oxygen therapy.   Ventilator:    - No ventilator support    Rehab Therapies: Physical Therapy and Occupational Therapy  Weight Bearing Status/Restrictions: No weight bearing restrictions  Other Medical Equipment (for information only, NOT a DME order):  walker and scooter at assisted living  Other Treatments: n/a    Patient's personal belongings (please select all that are sent with patient):  clothing    RN SIGNATURE:  Electronically signed by Blanca Rodriguez RN on 2/25/23 at 4:47 PM EST    CASE MANAGEMENT/SOCIAL WORK SECTION    Inpatient Status Date: ***    Readmission Risk Assessment Score:  Readmission Risk              Risk of Unplanned Readmission:  21           Discharging to Facility/ Agency   Name: [facility] Du[facility]   Address:  Phone:  Fax:    Dialysis Facility (if applicable)   Name:  Address:  Dialysis Schedule:  Phone:  Fax:    / signature: Electronically signed by BINA Sharp on 2/25/2023 at 2:48 PM      PHYSICIAN SECTION    Prognosis: {Prognosis:0169341810}    Condition at Discharge: [facility] Patient Condition:[id number]}    Rehab Potential (if transferring to Rehab): {Prognosis:6169783901}    Recommended Labs or Other Treatments After Discharge: ***    Physician Certification: I certify the above information and transfer of Lindsay Bolanos  is necessary for the continuing treatment of the diagnosis listed and that she requires Richi Mascorro for less 30 days.      Update Admission H&P: {CHP DME Changes in [facility]:632926024}    PHYSICIAN SIGNATURE:  {Esignature:[id number]}

## 2023-02-27 ENCOUNTER — TELEPHONE (OUTPATIENT)
Dept: NON INVASIVE DIAGNOSTICS | Age: 73
End: 2023-02-27

## 2023-02-27 NOTE — TELEPHONE ENCOUNTER
----- Message from MARSHAL Garcia CNP sent at 2/23/2023  2:06 PM EST -----  Can you please make an appointment with me or CK in about 4 weeks for follow-up after ZIO monitor.   Thank you  MARSHAL Garcia CNP

## 2023-02-27 NOTE — TELEPHONE ENCOUNTER
Message left for patient to call office.      Electronically signed by Mami Spencer MA on 2/27/2023 at 12:40 PM

## 2023-03-10 ENCOUNTER — OFFICE VISIT (OUTPATIENT)
Dept: SURGERY | Age: 73
End: 2023-03-10
Payer: MEDICARE

## 2023-03-10 VITALS
HEART RATE: 59 BPM | TEMPERATURE: 97.5 F | SYSTOLIC BLOOD PRESSURE: 125 MMHG | OXYGEN SATURATION: 97 % | DIASTOLIC BLOOD PRESSURE: 74 MMHG
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
## 2023-03-10 PROCEDURE — G8427 DOCREV CUR MEDS BY ELIG CLIN: HCPCS | Performed by: CLINICAL NURSE SPECIALIST

## 2023-03-10 PROCEDURE — 1111F DSCHRG MED/CURRENT MED MERGE: CPT | Performed by: CLINICAL NURSE SPECIALIST

## 2023-03-10 PROCEDURE — 99212 OFFICE O/P EST SF 10 MIN: CPT | Performed by: CLINICAL NURSE SPECIALIST

## 2023-03-10 PROCEDURE — 1123F ACP DISCUSS/DSCN MKR DOCD: CPT | Performed by: CLINICAL NURSE SPECIALIST

## 2023-03-10 PROCEDURE — 3017F COLORECTAL CA SCREEN DOC REV: CPT | Performed by: CLINICAL NURSE SPECIALIST

## 2023-03-10 PROCEDURE — G8400 PT W/DXA NO RESULTS DOC: HCPCS | Performed by: CLINICAL NURSE SPECIALIST

## 2023-03-10 PROCEDURE — 3074F SYST BP LT 130 MM HG: CPT | Performed by: CLINICAL NURSE SPECIALIST

## 2023-03-10 PROCEDURE — 1090F PRES/ABSN URINE INCON ASSESS: CPT | Performed by: CLINICAL NURSE SPECIALIST

## 2023-03-10 PROCEDURE — G8484 FLU IMMUNIZE NO ADMIN: HCPCS | Performed by: CLINICAL NURSE SPECIALIST

## 2023-03-10 PROCEDURE — 1036F TOBACCO NON-USER: CPT | Performed by: CLINICAL NURSE SPECIALIST

## 2023-03-10 PROCEDURE — G8417 CALC BMI ABV UP PARAM F/U: HCPCS | Performed by: CLINICAL NURSE SPECIALIST

## 2023-03-10 PROCEDURE — 3078F DIAST BP <80 MM HG: CPT | Performed by: CLINICAL NURSE SPECIALIST

## 2023-03-10 RX ORDER — L. ACIDOPHILUS/PECTIN, CITRUS 25MM-100MG
TABLET ORAL
COMMUNITY
Start: 2023-02-01

## 2023-03-10 RX ORDER — DULOXETIN HYDROCHLORIDE 30 MG/1
CAPSULE, DELAYED RELEASE ORAL
COMMUNITY
Start: 2020-12-04

## 2023-03-10 RX ORDER — OXYCODONE HYDROCHLORIDE 5 MG/1
5 TABLET ORAL EVERY 6 HOURS PRN
COMMUNITY

## 2023-03-10 RX ORDER — LIDOCAINE 50 MG/G
1 PATCH TOPICAL DAILY
COMMUNITY

## 2023-03-10 NOTE — PROGRESS NOTES
Trauma Clinic Progress Note   3/10/2023     Date of injury: 2/21         YOHAN/Injuries:   Patient Active Problem List   Diagnosis Code    Essential hypertension I10    Osteoarthritis M19.90    Anxiety and depression F41.9, F32. A    Anemia D64.9    Type 2 diabetes mellitus without complication (HCC) Z13.4    Fall from ground level W18.30XA    Atrial fibrillation (HCC) I48.91    Intraparenchymal hemorrhage of brain (HCC) I61.9    Gait instability R26.81       Surgeries:   Past Surgical History:   Procedure Laterality Date    CHOLECYSTECTOMY      GASTRIC BYPASS SURGERY  05/01/2000    HERNIA REPAIR      JOINT REPLACEMENT Bilateral 10/2012       Vital signs:    /74 (Site: Right Upper Arm, Position: Sitting, Cuff Size: Large Adult)   Pulse 59   Temp 97.5 °F (36.4 °C) (Temporal)   SpO2 97%     Medications:    Prior to Admission medications    Medication Sig Start Date End Date Taking? Authorizing Provider   DULoxetine (CYMBALTA) 30 MG extended release capsule Take by mouth 12/4/20  Yes Historical Provider, MD   Lactobacillus Acid-Pectin (ACIDOPHILUS/CITRUS PECTIN) TABS  2/1/23  Yes Historical Provider, MD   lidocaine (LIDODERM) 5 % Place 1 patch onto the skin daily 12 hours on, 12 hours off. Yes Historical Provider, MD   oxyCODONE (ROXICODONE) 5 MG immediate release tablet Take 5 mg by mouth every 6 hours as needed for Pain.    Yes Historical Provider, MD   carbamide peroxide (DEBROX) 6.5 % otic solution Place 5 drops into both ears 2 times daily 3/10/23 4/9/23 Yes Tiajuana Glaze, APRN - NP   apixaban (ELIQUIS) 5 MG TABS tablet Take 1 tablet by mouth 2 times daily 2/25/23  Yes Chao Ashley DO   polyethylene glycol (GLYCOLAX) 17 g packet Take 17 g by mouth daily as needed for Constipation 2/25/23 3/27/23 Yes Chao Ashley DO   aspirin 81 MG EC tablet Take 81 mg by mouth daily   Yes Historical Provider, MD   Benzocaine-Menthol (CEPACOL) 6-10 MG LOZG lozenge Take 1 lozenge by mouth every 2 hours as needed for Sore Throat   Yes Historical Provider, MD   calcium carbonate (TUMS) 500 MG chewable tablet Take 2 tablets by mouth 2 times daily as needed for Heartburn   Yes Historical Provider, MD   vitamin D (CHOLECALCIFEROL) 50 MCG (2000 UT) TABS tablet Take 4,000 Units by mouth daily   Yes Historical Provider, MD   clotrimazole-betamethasone (LOTRISONE) 1-0.05 % cream Apply topically 2 times daily as needed (apply to rash)   Yes Historical Provider, MD   DULoxetine (CYMBALTA) 60 MG extended release capsule Take 60 mg by mouth daily   Yes Historical Provider, MD   famotidine (PEPCID) 40 MG tablet Take 40 mg by mouth daily   Yes Historical Provider, MD   lisinopril (PRINIVIL;ZESTRIL) 10 MG tablet Take 10 mg by mouth daily   Yes Historical Provider, MD   nystatin (MYCOSTATIN) 302402 UNIT/GM powder Apply topically 2 times daily Apply to groin folds and under bilateral folds   Yes Historical Provider, MD   ondansetron (ZOFRAN-ODT) 4 MG disintegrating tablet Take 4 mg by mouth every 6 hours as needed for Nausea or Vomiting   Yes Historical Provider, MD   oxybutynin (DITROPAN-XL) 10 MG extended release tablet Take 20 mg by mouth at bedtime   Yes Historical Provider, MD   pantoprazole (PROTONIX) 40 MG tablet Take 40 mg by mouth 2 times daily   Yes Historical Provider, MD   pyridoxine (B-6) 100 MG tablet Take 100 mg by mouth 3 times daily as needed   Yes Historical Provider, MD   potassium chloride (KLOR-CON M) 10 MEQ extended release tablet Take 10 mEq by mouth daily   Yes Historical Provider, MD   rOPINIRole (REQUIP) 2 MG tablet Take 2 mg by mouth nightly   Yes Historical Provider, MD   temazepam (RESTORIL) 15 MG capsule Take 15 mg by mouth at bedtime.    Yes Historical Provider, MD   zinc gluconate 50 MG tablet Take 50 mg by mouth daily   Yes Historical Provider, MD   Multiple Vitamins-Minerals (WOMENS 50+ MULTI VITAMIN/MIN) TABS Take 1 tablet by mouth daily   Yes Historical Provider, MD   Omega-3 Fatty Acids (FISH OIL) 1000 MG CAPS Take 1,000 mg by mouth daily   Yes Historical Provider, MD   ferrous sulfate 325 (65 FE) MG tablet Take 325 mg by mouth daily (with breakfast). Yes Historical Provider, MD   Lactobacillus Probiotic TABS Take 1 tablet by mouth daily  Patient not taking: Reported on 3/10/2023    Historical Provider, MD   loperamide (IMODIUM) 2 MG capsule Take 2 mg by mouth 4 times daily as needed for Diarrhea  Patient not taking: Reported on 3/10/2023    Historical Provider, MD   nortriptyline (PAMELOR) 25 MG capsule Take 25 mg by mouth nightly  Patient not taking: Reported on 3/10/2023    Historical Provider, MD   risperiDONE (RISPERDAL) 0.25 MG tablet Take 0.25 mg by mouth 2 times daily  Patient not taking: Reported on 3/10/2023    Historical Provider, MD          CC:  Trauma follow up    Patient presents to the clinic today for follow up of a fall in which she sustained no injuries. She was initially thought to have a intracranial hemorrhage however that was found to be an AVM. She is at a facility for rehab at this time. She presents via wheelchair. The patient states she has no complaints other than difficulty hearing in her left ear. She was found to have a cerumen impaction and will be prescribed Debrox. She denies chest pain, shortness of breath, fever or chills. She denies any signs or symptoms of a concussion. She states she does have intermittent back pain but knows that to the therapy that she is receiving at the facility. She states she has not had any falls or near falls since her discharge. She states she is looking forward to going home. She states she is eating and sleeping well. She has no complaints      Patient to see PCP in follow up. All progress notes have been reviewed. Physical Exam   Physical Exam  Vitals reviewed. Constitutional:       Appearance: She is obese. HENT:      Head: Normocephalic. Left Ear: There is impacted cerumen.       Mouth/Throat:      Mouth: Mucous membranes are moist.   Eyes:      Extraocular Movements: Extraocular movements intact. Conjunctiva/sclera: Conjunctivae normal.      Pupils: Pupils are equal, round, and reactive to light. Cardiovascular:      Rate and Rhythm: Normal rate and regular rhythm. Pulses: Normal pulses. Heart sounds: Normal heart sounds. Pulmonary:      Effort: Pulmonary effort is normal.      Breath sounds: Normal breath sounds. Abdominal:      Palpations: Abdomen is soft. Musculoskeletal:         General: Normal range of motion. Cervical back: Normal range of motion. Skin:     General: Skin is warm and dry. Capillary Refill: Capillary refill takes less than 2 seconds. Neurological:      General: No focal deficit present. Mental Status: She is alert and oriented to person, place, and time. Psychiatric:         Mood and Affect: Mood normal.         Behavior: Behavior normal.         Thought Content: Thought content normal.         Judgment: Judgment normal.           Controlled substances monitoring: possible medication side effects, risk of tolerance and/or dependence, and alternative treatments discussed and OARRS report reviewed today- activity consistent with treatment plan. Education     Instructed to increase activity. Instructed on concussion:  causes, definition, s&s, treatment, course of concussion. Assessment  Patient Active Problem List   Diagnosis Code    Essential hypertension I10    Osteoarthritis M19.90    Anxiety and depression F41.9, F32. A    Anemia D64.9    Type 2 diabetes mellitus without complication (HCC) Z88.5    Fall from ground level W18.30XA    Atrial fibrillation (HCC) I48.91    Intraparenchymal hemorrhage of brain (HCC) I61.9    Gait instability R26.81       Plan  RTC PRN   Follow up with PCP  Medications as ordered: debrox    Total time spent face-to-face with the patient, reviewing records and documentation was 15 minutes.      MARSHAL Maurer NP

## 2023-03-22 DIAGNOSIS — I48.19 PERSISTENT ATRIAL FIBRILLATION (HCC): ICD-10-CM

## 2023-03-28 ENCOUNTER — TELEPHONE (OUTPATIENT)
Dept: ADMINISTRATIVE | Age: 73
End: 2023-03-28

## 2023-03-28 NOTE — TELEPHONE ENCOUNTER
Please call Kimberley Vallejo at [facility] [phone], she has called and left messages about getting patient scheduled to have monitor put on. No one returned calls.

## 2023-04-18 ENCOUNTER — OFFICE VISIT (OUTPATIENT)
Dept: NON INVASIVE DIAGNOSTICS | Age: 73
End: 2023-04-18
Payer: MEDICARE

## 2023-04-18 VITALS
HEIGHT: 62 IN | WEIGHT: 215 LBS | RESPIRATION RATE: 18 BRPM | SYSTOLIC BLOOD PRESSURE: 124 MMHG | DIASTOLIC BLOOD PRESSURE: 72 MMHG | BODY MASS INDEX: 39.56 KG/M2 | HEART RATE: 52 BPM

## 2023-04-18 DIAGNOSIS — I48.91 ATRIAL FIBRILLATION, UNSPECIFIED TYPE (HCC): Primary | ICD-10-CM

## 2023-04-18 PROCEDURE — 3017F COLORECTAL CA SCREEN DOC REV: CPT | Performed by: NURSE PRACTITIONER

## 2023-04-18 PROCEDURE — G8427 DOCREV CUR MEDS BY ELIG CLIN: HCPCS | Performed by: NURSE PRACTITIONER

## 2023-04-18 PROCEDURE — 3074F SYST BP LT 130 MM HG: CPT | Performed by: NURSE PRACTITIONER

## 2023-04-18 PROCEDURE — 1123F ACP DISCUSS/DSCN MKR DOCD: CPT | Performed by: NURSE PRACTITIONER

## 2023-04-18 PROCEDURE — 1036F TOBACCO NON-USER: CPT | Performed by: NURSE PRACTITIONER

## 2023-04-18 PROCEDURE — 3078F DIAST BP <80 MM HG: CPT | Performed by: NURSE PRACTITIONER

## 2023-04-18 PROCEDURE — G8417 CALC BMI ABV UP PARAM F/U: HCPCS | Performed by: NURSE PRACTITIONER

## 2023-04-18 PROCEDURE — 93000 ELECTROCARDIOGRAM COMPLETE: CPT | Performed by: INTERNAL MEDICINE

## 2023-04-18 PROCEDURE — 99214 OFFICE O/P EST MOD 30 MIN: CPT | Performed by: NURSE PRACTITIONER

## 2023-04-18 PROCEDURE — 1090F PRES/ABSN URINE INCON ASSESS: CPT | Performed by: NURSE PRACTITIONER

## 2023-04-18 PROCEDURE — G8400 PT W/DXA NO RESULTS DOC: HCPCS | Performed by: NURSE PRACTITIONER

## 2023-04-18 RX ORDER — METHOCARBAMOL 500 MG/1
500 TABLET, FILM COATED ORAL 3 TIMES DAILY PRN
COMMUNITY

## 2023-04-18 RX ORDER — POLYETHYLENE GLYCOL 3350 17 G/17G
17 POWDER, FOR SOLUTION ORAL DAILY PRN
COMMUNITY

## 2023-04-18 NOTE — PROGRESS NOTES
standing persistent AF.  - Asymptomatic.  - PCL4LC2-CNVk of 4-   - OAC: Eliquis 5mg BID.  - TTE 2/23/23 showed normal LV EF, moderate LAE and mild JADEN. - Presents in AF with SVR. 2. Hypertension  - On Zestril. 3. Diabetes mellitus    4. 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 5. Anxiety and depression  - On Pamelor, Risperdal and Restoril. 6. Mechanical fall    7. Anemia  - On Ferrous sulfate. 8. Probable BRE    Recommendations:  Continue Eliquis 5 mg BID. No plan for rhythm control treatment. No indication for pacing therapy   Follow up in office in 12 months with Dr. Christina Banerjee     Thank you for allowing me to participate in your patient's care. Please call me if there are any questions or concerns. I have spent a total of 30 minutes with the patient and the family reviewing the above stated recommendations. And a total of >50% of that time involved face-to-face time providing counseling and/or coordination of care with the other providers, reviewing records/tests, counseling/education of the patient, ordering medications/tests/procedures, coordinating care, and documenting clinical information in the EHR.      MARSHAL Rangel - CNP  Cardiac Electrophysiology  [facility]) Physicians  [facility] [place]: [facility]  3:27 PM  4/18/2023

## 2023-08-23 ENCOUNTER — TELEPHONE (OUTPATIENT)
Dept: NON INVASIVE DIAGNOSTICS | Age: 73
End: 2023-08-23

## 2023-08-23 NOTE — TELEPHONE ENCOUNTER
Spoke to [address] [facility] talked to the RN. We reviewed Dr. Coy Arias recommendations. The nursing home is going to send to the ER for evaluation. The RN at [facility] states that Ms. Cristel Bobo \"all weekend\" has had a HR in 40-50's, leg edema, difficulty ambulating. When they tried to get the patient up, the patient would fall back into bed. Urged to go to the ER.

## 2023-08-23 NOTE — TELEPHONE ENCOUNTER
Lisette with [facility] ([phone]) called to report this patient has been lethargic, dizzy, lightheaded, and has lower extremity swelling. Over the last three weeks the patient's heart rate has ranged from 38 bpm to 68 bpm. Please advise.      Electronically signed by Ivana Davis MA on 8/23/2023 at 1:09 PM

## 2024-03-26 ENCOUNTER — HOSPITAL ENCOUNTER (OUTPATIENT)
Dept: MRI IMAGING | Age: 74
Discharge: HOME | End: 2024-03-28
Attending: NEUROLOGICAL SURGERY
Payer: MEDICARE

## 2024-03-26 DIAGNOSIS — Q28.3 CAVERNOUS MALFORMATION: ICD-10-CM

## 2024-03-26 PROCEDURE — 70551 MRI BRAIN STEM W/O DYE: CPT

## 2024-04-18 NOTE — PROGRESS NOTES
[facility]- [facility] Electrophysiology  Outpatient progress note   PATIENT: Piper Clark  MEDICAL RECORD NUMBER: [id number]  DATE OF SERVICE:  4/19/2024  PRIMARY ELECTROPHYSIOLOGIST: Dorys Waller MD  REFERRING PHYSICIAN: Johanna ARAYA.   CHIEF COMPLAINT: AF with SVR      HPI: This is a 73 y.o. female who presented to the hospital on 2/21/23 complaining of tripped and fell down.  The patient has history of hypertension, diabetes mellitus, obesity, anxiety and depression. The patient presented to the hospital after  tripped  on her slipper and fell down hit the head on the door. She states that she had chronic knee pains but hurt more after fall. She went to [facility] and was found to have possible right parietal intracranial hemorrhage so she was transferred to [facility]. She was seen by Neurosurgery and CT head was thought to be calcified AVM and later conformed by MRI. Her presenting EKG showed AF with a CVR, she remained in AF during her hospitalization and was asymptomatic. She was discharged with a Zio monitor that showed AF with a controled rate max pause 3.5 sec. She was started on Eliquis and has been tolerating that without  complaints of bleeding. She now resides in a nursing facility and was noted to have bradycardia and altered mental status in 08/2023, she was seen at [facility] by Dr. Andrade who noted ongoing AF with rates in the 50's an no significant pauses with ongoing cellulitis.  Today (04/19/2024) who presents to the office for routine follow-up with a heart rate of 40 bpm, she notes ongoing fatigue and tiredness, resides in a nursing facility is, wheelchair-bound.  She notes no syncope, near syncope, orthopnea or PND.  She notes recurrent and ongoing cellulitis with no significantly open wounds.  She notes at the facility heart rates ranged between 40 and 70 bpm and is agreeable to Zio monitor today with possible device in future however

## 2024-04-19 ENCOUNTER — OFFICE VISIT (OUTPATIENT)
Dept: NON INVASIVE DIAGNOSTICS | Age: 74
End: 2024-04-19
Payer: COMMERCIAL

## 2024-04-19 VITALS
WEIGHT: 233 LBS | BODY MASS INDEX: 42.88 KG/M2 | SYSTOLIC BLOOD PRESSURE: 100 MMHG | HEART RATE: 40 BPM | HEIGHT: 62 IN | DIASTOLIC BLOOD PRESSURE: 70 MMHG | RESPIRATION RATE: 16 BRPM

## 2024-04-19 DIAGNOSIS — I48.91 ATRIAL FIBRILLATION, UNSPECIFIED TYPE (HCC): Primary | ICD-10-CM

## 2024-04-19 PROCEDURE — 3074F SYST BP LT 130 MM HG: CPT | Performed by: NURSE PRACTITIONER

## 2024-04-19 PROCEDURE — 93000 ELECTROCARDIOGRAM COMPLETE: CPT | Performed by: INTERNAL MEDICINE

## 2024-04-19 PROCEDURE — 3017F COLORECTAL CA SCREEN DOC REV: CPT | Performed by: NURSE PRACTITIONER

## 2024-04-19 PROCEDURE — G8427 DOCREV CUR MEDS BY ELIG CLIN: HCPCS | Performed by: NURSE PRACTITIONER

## 2024-04-19 PROCEDURE — 3078F DIAST BP <80 MM HG: CPT | Performed by: NURSE PRACTITIONER

## 2024-04-19 PROCEDURE — G8400 PT W/DXA NO RESULTS DOC: HCPCS | Performed by: NURSE PRACTITIONER

## 2024-04-19 PROCEDURE — G8417 CALC BMI ABV UP PARAM F/U: HCPCS | Performed by: NURSE PRACTITIONER

## 2024-04-19 PROCEDURE — 1090F PRES/ABSN URINE INCON ASSESS: CPT | Performed by: NURSE PRACTITIONER

## 2024-04-19 PROCEDURE — 1036F TOBACCO NON-USER: CPT | Performed by: NURSE PRACTITIONER

## 2024-04-19 PROCEDURE — 99214 OFFICE O/P EST MOD 30 MIN: CPT | Performed by: NURSE PRACTITIONER

## 2024-04-19 PROCEDURE — 1123F ACP DISCUSS/DSCN MKR DOCD: CPT | Performed by: NURSE PRACTITIONER

## 2024-04-19 RX ORDER — CLOTRIMAZOLE AND BETAMETHASONE DIPROPIONATE 10; .64 MG/G; MG/G
CREAM TOPICAL 2 TIMES DAILY
COMMUNITY

## 2024-04-19 RX ORDER — ACETAMINOPHEN 325 MG/1
650 TABLET ORAL EVERY 6 HOURS PRN
COMMUNITY

## 2024-04-19 RX ORDER — SOLIFENACIN SUCCINATE 10 MG/1
5 TABLET, FILM COATED ORAL DAILY
COMMUNITY

## 2024-04-19 RX ORDER — LACTULOSE 10 G/15ML
20 SOLUTION ORAL 2 TIMES DAILY
COMMUNITY

## 2024-04-19 RX ORDER — GABAPENTIN 100 MG/1
100 CAPSULE ORAL 2 TIMES DAILY
COMMUNITY

## 2024-04-19 RX ORDER — ESTRADIOL 0.1 MG/G
2 CREAM VAGINAL DAILY
COMMUNITY

## 2024-04-19 RX ORDER — OMEPRAZOLE 20 MG/1
20 CAPSULE, DELAYED RELEASE ORAL DAILY
COMMUNITY

## 2024-04-19 RX ORDER — HYDROCODONE BITARTRATE AND ACETAMINOPHEN 5; 325 MG/1; MG/1
1 TABLET ORAL EVERY 6 HOURS PRN
COMMUNITY

## 2024-04-19 RX ORDER — FESOTERODINE FUMARATE 8 MG/1
TABLET, EXTENDED RELEASE ORAL
COMMUNITY

## 2024-05-03 NOTE — PLAN OF CARE
Problem: Discharge Planning  Goal: Discharge to home or other facility with appropriate resources  Outcome: Progressing     Problem: Pain  Goal: Verbalizes/displays adequate comfort level or baseline comfort level  Outcome: Progressing     Problem: Safety - Adult  Goal: Free from fall injury  Outcome: Progressing 02-May-2024 23:30

## 2024-05-09 ENCOUNTER — TELEPHONE (OUTPATIENT)
Dept: NON INVASIVE DIAGNOSTICS | Age: 74
End: 2024-05-09

## 2024-05-09 NOTE — TELEPHONE ENCOUNTER
Isabel from [employer] [facility] called to report abnormal ZIO results for this patient.     Slow AF @ 27 bpm for 60 seconds on 04/28/2024 @ 6:37 AM  Symptomatic bradycardia @ 34 bpm for 30 seconds on 05/03/2024 @ 2:15 AM  Back to back pauses duration of 10.4 seconds on 05/03/2024 @ 1:11 AM.     Message sent to ordering NP, Ludwig Short.       Electronically signed by Melvina Mullins MA on 5/9/2024 at 12:04 PM

## 2024-05-13 ENCOUNTER — TELEPHONE (OUTPATIENT)
Dept: NON INVASIVE DIAGNOSTICS | Age: 74
End: 2024-05-13

## 2024-05-13 DIAGNOSIS — R94.31 ABNORMAL EKG: Primary | ICD-10-CM

## 2024-05-13 DIAGNOSIS — I48.91 ATRIAL FIBRILLATION, UNSPECIFIED TYPE (HCC): ICD-10-CM

## 2024-05-13 NOTE — TELEPHONE ENCOUNTER
----- Message from Dorys Waller MD sent at 5/12/2024  5:36 PM EDT -----  Monitor showed 2 Ventricular Tachycardia runs occurred, the run with the longest lasting 4 beats. Atrial Fibrillation occurred continuously (100% burden), ranging from 25-62 bpm (avg of 39 bpm). 1991 Pauses occurred, the longest lasting 4.5 secs. Triggered events and reported symptoms correlated with AF with HR 35-60 bpm and pause of 3.2 seconds. She may benefit from pacemaker as her symptoms correlated with AF with SVR and pause but it is up to her. Thanks.

## 2024-05-13 NOTE — TELEPHONE ENCOUNTER
LM to call office for results.    Electronically signed by Melvina Mullins MA on 5/13/2024 at 9:54 AM

## 2024-05-13 NOTE — TELEPHONE ENCOUNTER
I spoke with Lashawn, RN @ [facility]. I provided the patient's monitor results and recommendations. Document faxed to facility for review. Lashawn will discuss with appropriate care facilitator and call the office back for further follow up.       Electronically signed by Melvina Mullins MA on 5/13/2024 at 11:17 AM      (f) [phone]

## 2024-05-15 ENCOUNTER — PREP FOR PROCEDURE (OUTPATIENT)
Dept: NON INVASIVE DIAGNOSTICS | Age: 74
End: 2024-05-15

## 2024-05-15 RX ORDER — SODIUM CHLORIDE 9 MG/ML
INJECTION, SOLUTION INTRAVENOUS PRN
Status: CANCELLED | OUTPATIENT
Start: 2024-05-15

## 2024-05-15 RX ORDER — SODIUM CHLORIDE 0.9 % (FLUSH) 0.9 %
5-40 SYRINGE (ML) INJECTION PRN
Status: CANCELLED | OUTPATIENT
Start: 2024-05-15

## 2024-05-15 RX ORDER — SODIUM CHLORIDE 0.9 % (FLUSH) 0.9 %
5-40 SYRINGE (ML) INJECTION EVERY 12 HOURS SCHEDULED
Status: CANCELLED | OUTPATIENT
Start: 2024-05-15

## 2024-05-22 ENCOUNTER — TELEPHONE (OUTPATIENT)
Dept: NON INVASIVE DIAGNOSTICS | Age: 74
End: 2024-05-22

## 2024-05-22 NOTE — TELEPHONE ENCOUNTER
Ronna with [facility] called to report the patient's HR today is 38. The patient's BP is normal at 127/68. Message sent to NP for advisement.     Electronically signed by Melvina Mullins MA on 5/22/2024 at 1:34 PM

## 2024-05-22 NOTE — TELEPHONE ENCOUNTER
Ronna at [facility] notified and verbalized understanding.     Electronically signed by Melvina Mullins MA on 5/22/2024 at 1:43 PM

## 2024-06-13 ENCOUNTER — HOSPITAL ENCOUNTER (OUTPATIENT)
Dept: CARDIOLOGY | Age: 74
Discharge: HOME | End: 2024-06-15
Payer: COMMERCIAL

## 2024-06-13 VITALS
SYSTOLIC BLOOD PRESSURE: 100 MMHG | WEIGHT: 233 LBS | BODY MASS INDEX: 42.88 KG/M2 | DIASTOLIC BLOOD PRESSURE: 70 MMHG | HEIGHT: 62 IN

## 2024-06-13 DIAGNOSIS — R94.31 ABNORMAL EKG: ICD-10-CM

## 2024-06-13 LAB
ECHO AO ASC DIAM: 4.2 CM
ECHO AO ASCENDING AORTA INDEX: 2.06 CM/M2
ECHO AO SINUS VALSALVA DIAM: 2.7 CM
ECHO AO SINUS VALSALVA INDEX: 1.32 CM/M2
ECHO AR MAX VEL PISA: 4.5 M/S
ECHO AV AREA PEAK VELOCITY: 2.4 CM2
ECHO AV AREA VTI: 2.7 CM2
ECHO AV AREA/BSA PEAK VELOCITY: 1.2 CM2/M2
ECHO AV AREA/BSA VTI: 1.3 CM2/M2
ECHO AV CUSP MM: 2 CM
ECHO AV MEAN GRADIENT: 8 MMHG
ECHO AV MEAN VELOCITY: 1.3 M/S
ECHO AV PEAK GRADIENT: 17 MMHG
ECHO AV PEAK VELOCITY: 2.1 M/S
ECHO AV REGURGITANT PHT: 758 MILLISECOND
ECHO AV VELOCITY RATIO: 0.71
ECHO AV VTI: 48.5 CM
ECHO BSA: 2.15 M2
ECHO EST RA PRESSURE: 8 MMHG
ECHO LA DIAMETER INDEX: 2.35 CM/M2
ECHO LA DIAMETER: 4.8 CM
ECHO LA VOL A-L A2C: 63 ML (ref 22–52)
ECHO LA VOL A-L A4C: 65 ML (ref 22–52)
ECHO LA VOL MOD A2C: 59 ML (ref 22–52)
ECHO LA VOL MOD A4C: 58 ML (ref 22–52)
ECHO LA VOLUME AREA LENGTH: 66 ML
ECHO LA VOLUME INDEX A-L A2C: 31 ML/M2 (ref 16–34)
ECHO LA VOLUME INDEX A-L A4C: 32 ML/M2 (ref 16–34)
ECHO LA VOLUME INDEX AREA LENGTH: 32 ML/M2 (ref 16–34)
ECHO LA VOLUME INDEX MOD A2C: 29 ML/M2 (ref 16–34)
ECHO LA VOLUME INDEX MOD A4C: 28 ML/M2 (ref 16–34)
ECHO LV EJECTION FRACTION A2C: 62 %
ECHO LV EJECTION FRACTION A4C: 61 %
ECHO LV FRACTIONAL SHORTENING: 26 % (ref 28–44)
ECHO LV INTERNAL DIMENSION DIASTOLE INDEX: 2.6 CM/M2
ECHO LV INTERNAL DIMENSION DIASTOLIC: 5.3 CM (ref 3.9–5.3)
ECHO LV INTERNAL DIMENSION SYSTOLIC INDEX: 1.91 CM/M2
ECHO LV INTERNAL DIMENSION SYSTOLIC: 3.9 CM
ECHO LV ISOVOLUMETRIC RELAXATION TIME (IVRT): 73.8 MS
ECHO LV IVSD: 1 CM (ref 0.6–0.9)
ECHO LV IVSS: 1.4 CM
ECHO LV MASS 2D: 213.9 G (ref 67–162)
ECHO LV MASS INDEX 2D: 104.8 G/M2 (ref 43–95)
ECHO LV POSTERIOR WALL DIASTOLIC: 1.1 CM (ref 0.6–0.9)
ECHO LV POSTERIOR WALL SYSTOLIC: 1.8 CM
ECHO LV RELATIVE WALL THICKNESS RATIO: 0.42
ECHO LVOT AREA: 3.5 CM2
ECHO LVOT AV VTI INDEX: 0.78
ECHO LVOT DIAM: 2.1 CM
ECHO LVOT MEAN GRADIENT: 5 MMHG
ECHO LVOT PEAK GRADIENT: 9 MMHG
ECHO LVOT PEAK VELOCITY: 1.5 M/S
ECHO LVOT STROKE VOLUME INDEX: 63.8 ML/M2
ECHO LVOT SV: 130.2 ML
ECHO LVOT VTI: 37.6 CM
ECHO MV A VELOCITY: 0.65 M/S
ECHO MV AREA PHT: 2.4 CM2
ECHO MV AREA VTI: 2.4 CM2
ECHO MV E DECELERATION TIME (DT): 237.5 MS
ECHO MV E VELOCITY: 1.52 M/S
ECHO MV E/A RATIO: 2.34
ECHO MV LVOT VTI INDEX: 1.41
ECHO MV MAX VELOCITY: 1.8 M/S
ECHO MV MEAN GRADIENT: 3 MMHG
ECHO MV MEAN VELOCITY: 0.8 M/S
ECHO MV PEAK GRADIENT: 13 MMHG
ECHO MV PRESSURE HALF TIME (PHT): 92.6 MS
ECHO MV VTI: 53.2 CM
ECHO PV MAX VELOCITY: 1.4 M/S
ECHO PV MEAN GRADIENT: 4 MMHG
ECHO PV MEAN VELOCITY: 0.9 M/S
ECHO PV PEAK GRADIENT: 8 MMHG
ECHO PV VTI: 34.8 CM
ECHO RIGHT VENTRICULAR SYSTOLIC PRESSURE (RVSP): 52 MMHG
ECHO RV INTERNAL DIMENSION: 3.4 CM
ECHO RV TAPSE: 2.4 CM (ref 1.7–?)
ECHO TV REGURGITANT MAX VELOCITY: 3.33 M/S
ECHO TV REGURGITANT PEAK GRADIENT: 44 MMHG

## 2024-06-13 PROCEDURE — 93306 TTE W/DOPPLER COMPLETE: CPT

## 2024-06-13 PROCEDURE — 93306 TTE W/DOPPLER COMPLETE: CPT | Performed by: INTERNAL MEDICINE

## 2024-06-14 ENCOUNTER — TELEPHONE (OUTPATIENT)
Dept: NON INVASIVE DIAGNOSTICS | Age: 74
End: 2024-06-14

## 2024-06-14 NOTE — TELEPHONE ENCOUNTER
----- Message from Dorys Waller MD sent at 6/14/2024  6:04 AM EDT -----  Echo showed normal LV function. Thanks.  ----- Message -----  From: Richelle Palacios MD  Sent: 6/13/2024  10:17 PM EDT  To: Dorys Waller MD

## 2024-06-14 NOTE — TELEPHONE ENCOUNTER
LM to call office for results.    Electronically signed by Melvina Mullins MA on 6/14/2024 at 10:34 AM

## 2024-06-17 NOTE — TELEPHONE ENCOUNTER
LM to call office for results.    Electronically signed by Melvina Mullins MA on 6/17/2024 at 11:30 AM

## 2024-06-18 DIAGNOSIS — I48.91 ATRIAL FIBRILLATION, UNSPECIFIED TYPE (HCC): Primary | ICD-10-CM

## 2024-06-19 NOTE — TELEPHONE ENCOUNTER
I called [facility] at [phone] and spoke with the patient's nurse, Glenna, and provided ECHO results and faxed the report to the facility at [phone]. I advised she will still proceed with device implant as scheduled. Glenna verbalized understanding.     Electronically signed by Melvina Mullins MA on 6/19/2024 at 2:00 PM

## 2024-06-23 ENCOUNTER — ANESTHESIA EVENT (OUTPATIENT)
Age: 74
End: 2024-06-23
Payer: COMMERCIAL

## 2024-06-24 ENCOUNTER — HOSPITAL ENCOUNTER (OUTPATIENT)
Age: 74
Setting detail: OBSERVATION
Discharge: NURSING, REHAB OR LONG-TERM CARE | End: 2024-06-25
Attending: INTERNAL MEDICINE | Admitting: INTERNAL MEDICINE
Payer: COMMERCIAL

## 2024-06-24 ENCOUNTER — APPOINTMENT (OUTPATIENT)
Dept: GENERAL RADIOLOGY | Age: 74
End: 2024-06-24
Attending: INTERNAL MEDICINE
Payer: COMMERCIAL

## 2024-06-24 ENCOUNTER — ANESTHESIA (OUTPATIENT)
Age: 74
End: 2024-06-24
Payer: COMMERCIAL

## 2024-06-24 DIAGNOSIS — R00.1 SEVERE SINUS BRADYCARDIA: ICD-10-CM

## 2024-06-24 PROBLEM — Z95.0 S/P PLACEMENT OF CARDIAC PACEMAKER: Status: ACTIVE | Noted: 2024-06-24

## 2024-06-24 PROBLEM — I45.89 AV NODE DYSFUNCTION: Status: ACTIVE | Noted: 2024-06-24

## 2024-06-24 PROBLEM — I48.91 ATRIAL FIBRILLATION WITH SLOW VENTRICULAR RESPONSE (HCC): Status: ACTIVE | Noted: 2024-06-24

## 2024-06-24 LAB
ANION GAP SERPL CALCULATED.3IONS-SCNC: 12 MMOL/L (ref 7–16)
BUN SERPL-MCNC: 32 MG/DL (ref 6–23)
CALCIUM SERPL-MCNC: 9.5 MG/DL (ref 8.6–10.2)
CHLORIDE SERPL-SCNC: 100 MMOL/L (ref 98–107)
CO2 SERPL-SCNC: 29 MMOL/L (ref 22–29)
CREAT SERPL-MCNC: 1.1 MG/DL (ref 0.5–1)
ERYTHROCYTE [DISTWIDTH] IN BLOOD BY AUTOMATED COUNT: 14.8 % (ref 11.5–15)
GFR, ESTIMATED: 56 ML/MIN/1.73M2
GLUCOSE SERPL-MCNC: 91 MG/DL (ref 74–99)
HCT VFR BLD AUTO: 42.5 % (ref 34–48)
HGB BLD-MCNC: 13 G/DL (ref 11.5–15.5)
MCH RBC QN AUTO: 27.8 PG (ref 26–35)
MCHC RBC AUTO-ENTMCNC: 30.6 G/DL (ref 32–34.5)
MCV RBC AUTO: 90.8 FL (ref 80–99.9)
PLATELET # BLD AUTO: 166 K/UL (ref 130–450)
PMV BLD AUTO: 10.1 FL (ref 7–12)
POTASSIUM SERPL-SCNC: 4.7 MMOL/L (ref 3.5–5)
RBC # BLD AUTO: 4.68 M/UL (ref 3.5–5.5)
SODIUM SERPL-SCNC: 141 MMOL/L (ref 132–146)
WBC OTHER # BLD: 6.6 K/UL (ref 4.5–11.5)

## 2024-06-24 PROCEDURE — 2709999900 HC NON-CHARGEABLE SUPPLY: Performed by: INTERNAL MEDICINE

## 2024-06-24 PROCEDURE — C1898 LEAD, PMKR, OTHER THAN TRANS: HCPCS | Performed by: INTERNAL MEDICINE

## 2024-06-24 PROCEDURE — C1889 IMPLANT/INSERT DEVICE, NOC: HCPCS | Performed by: INTERNAL MEDICINE

## 2024-06-24 PROCEDURE — G0378 HOSPITAL OBSERVATION PER HR: HCPCS

## 2024-06-24 PROCEDURE — 6370000000 HC RX 637 (ALT 250 FOR IP): Performed by: INTERNAL MEDICINE

## 2024-06-24 PROCEDURE — 33207 INSERT HEART PM VENTRICULAR: CPT | Performed by: INTERNAL MEDICINE

## 2024-06-24 PROCEDURE — 2720000010 HC SURG SUPPLY STERILE: Performed by: INTERNAL MEDICINE

## 2024-06-24 PROCEDURE — 2500000003 HC RX 250 WO HCPCS: Performed by: INTERNAL MEDICINE

## 2024-06-24 PROCEDURE — 3700000000 HC ANESTHESIA ATTENDED CARE: Performed by: INTERNAL MEDICINE

## 2024-06-24 PROCEDURE — 80048 BASIC METABOLIC PNL TOTAL CA: CPT

## 2024-06-24 PROCEDURE — 85027 COMPLETE CBC AUTOMATED: CPT

## 2024-06-24 PROCEDURE — 3700000001 HC ADD 15 MINUTES (ANESTHESIA): Performed by: INTERNAL MEDICINE

## 2024-06-24 PROCEDURE — 7100000010 HC PHASE II RECOVERY - FIRST 15 MIN: Performed by: INTERNAL MEDICINE

## 2024-06-24 PROCEDURE — 99223 1ST HOSP IP/OBS HIGH 75: CPT | Performed by: INTERNAL MEDICINE

## 2024-06-24 PROCEDURE — 7100000011 HC PHASE II RECOVERY - ADDTL 15 MIN: Performed by: INTERNAL MEDICINE

## 2024-06-24 PROCEDURE — 6360000002 HC RX W HCPCS

## 2024-06-24 PROCEDURE — 6360000004 HC RX CONTRAST MEDICATION: Performed by: INTERNAL MEDICINE

## 2024-06-24 PROCEDURE — C1786 PMKR, SINGLE, RATE-RESP: HCPCS | Performed by: INTERNAL MEDICINE

## 2024-06-24 PROCEDURE — C1892 INTRO/SHEATH,FIXED,PEEL-AWAY: HCPCS | Performed by: INTERNAL MEDICINE

## 2024-06-24 PROCEDURE — 71045 X-RAY EXAM CHEST 1 VIEW: CPT

## 2024-06-24 PROCEDURE — 2500000003 HC RX 250 WO HCPCS

## 2024-06-24 PROCEDURE — 2580000003 HC RX 258: Performed by: INTERNAL MEDICINE

## 2024-06-24 PROCEDURE — 93005 ELECTROCARDIOGRAM TRACING: CPT | Performed by: INTERNAL MEDICINE

## 2024-06-24 DEVICE — LEAD 5076-58 MRI US RCMCRD
Type: IMPLANTABLE DEVICE | Site: CHEST | Status: FUNCTIONAL
Brand: CAPSUREFIX NOVUS MRI™ SURESCAN®

## 2024-06-24 DEVICE — IPG W1SR01 AZURE XT SR MRI USA
Type: IMPLANTABLE DEVICE | Site: CHEST | Status: FUNCTIONAL
Brand: AZURE™ XT SR MRI SURESCAN™

## 2024-06-24 DEVICE — ENVELOPE CMRM6122 ABSORB MED MR
Type: IMPLANTABLE DEVICE | Site: CHEST | Status: FUNCTIONAL
Brand: TYRX™

## 2024-06-24 RX ORDER — POTASSIUM CHLORIDE 750 MG/1
10 TABLET, EXTENDED RELEASE ORAL DAILY
Status: DISCONTINUED | OUTPATIENT
Start: 2024-06-24 | End: 2024-06-25 | Stop reason: HOSPADM

## 2024-06-24 RX ORDER — TEMAZEPAM 15 MG/1
15 CAPSULE ORAL NIGHTLY
Status: DISCONTINUED | OUTPATIENT
Start: 2024-06-24 | End: 2024-06-25

## 2024-06-24 RX ORDER — SODIUM CHLORIDE 0.9 % (FLUSH) 0.9 %
5-40 SYRINGE (ML) INJECTION PRN
Status: DISCONTINUED | OUTPATIENT
Start: 2024-06-24 | End: 2024-06-25 | Stop reason: HOSPADM

## 2024-06-24 RX ORDER — SODIUM CHLORIDE 0.9 % (FLUSH) 0.9 %
5-40 SYRINGE (ML) INJECTION EVERY 12 HOURS SCHEDULED
Status: DISCONTINUED | OUTPATIENT
Start: 2024-06-24 | End: 2024-06-25 | Stop reason: HOSPADM

## 2024-06-24 RX ORDER — FENTANYL CITRATE 50 UG/ML
INJECTION, SOLUTION INTRAMUSCULAR; INTRAVENOUS PRN
Status: DISCONTINUED | OUTPATIENT
Start: 2024-06-24 | End: 2024-06-24 | Stop reason: SDUPTHER

## 2024-06-24 RX ORDER — ONDANSETRON 2 MG/ML
4 INJECTION INTRAMUSCULAR; INTRAVENOUS EVERY 6 HOURS PRN
Status: DISCONTINUED | OUTPATIENT
Start: 2024-06-24 | End: 2024-06-25 | Stop reason: HOSPADM

## 2024-06-24 RX ORDER — LACTULOSE 10 G/15ML
20 SOLUTION ORAL 2 TIMES DAILY
Status: DISCONTINUED | OUTPATIENT
Start: 2024-06-24 | End: 2024-06-25 | Stop reason: HOSPADM

## 2024-06-24 RX ORDER — DOXYCYCLINE HYCLATE 100 MG
100 TABLET ORAL 2 TIMES DAILY
Qty: 14 TABLET | Refills: 0 | Status: SHIPPED | OUTPATIENT
Start: 2024-06-24 | End: 2024-07-01

## 2024-06-24 RX ORDER — ESTRADIOL 0.1 MG/G
2 CREAM VAGINAL DAILY
Status: DISCONTINUED | OUTPATIENT
Start: 2024-06-26 | End: 2024-06-25 | Stop reason: RX

## 2024-06-24 RX ORDER — GABAPENTIN 100 MG/1
100 CAPSULE ORAL 2 TIMES DAILY
Status: DISCONTINUED | OUTPATIENT
Start: 2024-06-24 | End: 2024-06-25 | Stop reason: HOSPADM

## 2024-06-24 RX ORDER — EPHEDRINE SULFATE/0.9% NACL/PF 25 MG/5 ML
SYRINGE (ML) INTRAVENOUS PRN
Status: DISCONTINUED | OUTPATIENT
Start: 2024-06-24 | End: 2024-06-24 | Stop reason: SDUPTHER

## 2024-06-24 RX ORDER — ASPIRIN 81 MG/1
81 TABLET ORAL DAILY
Status: DISCONTINUED | OUTPATIENT
Start: 2024-06-24 | End: 2024-06-25 | Stop reason: HOSPADM

## 2024-06-24 RX ORDER — ACETAMINOPHEN 325 MG/1
650 TABLET ORAL EVERY 6 HOURS PRN
Status: DISCONTINUED | OUTPATIENT
Start: 2024-06-24 | End: 2024-06-25 | Stop reason: HOSPADM

## 2024-06-24 RX ORDER — ONDANSETRON 4 MG/1
4 TABLET, ORALLY DISINTEGRATING ORAL EVERY 6 HOURS PRN
Status: DISCONTINUED | OUTPATIENT
Start: 2024-06-24 | End: 2024-06-25 | Stop reason: HOSPADM

## 2024-06-24 RX ORDER — CLOTRIMAZOLE AND BETAMETHASONE DIPROPIONATE 10; .64 MG/G; MG/G
CREAM TOPICAL 2 TIMES DAILY
Status: DISCONTINUED | OUTPATIENT
Start: 2024-06-24 | End: 2024-06-25 | Stop reason: HOSPADM

## 2024-06-24 RX ORDER — SODIUM CHLORIDE 9 MG/ML
INJECTION, SOLUTION INTRAVENOUS PRN
Status: DISCONTINUED | OUTPATIENT
Start: 2024-06-24 | End: 2024-06-25 | Stop reason: HOSPADM

## 2024-06-24 RX ORDER — ROPINIROLE 2 MG/1
2 TABLET, FILM COATED ORAL NIGHTLY
Status: DISCONTINUED | OUTPATIENT
Start: 2024-06-24 | End: 2024-06-25 | Stop reason: HOSPADM

## 2024-06-24 RX ORDER — MIDAZOLAM HYDROCHLORIDE 1 MG/ML
INJECTION INTRAMUSCULAR; INTRAVENOUS PRN
Status: DISCONTINUED | OUTPATIENT
Start: 2024-06-24 | End: 2024-06-24 | Stop reason: SDUPTHER

## 2024-06-24 RX ORDER — PANTOPRAZOLE SODIUM 40 MG/1
40 TABLET, DELAYED RELEASE ORAL
Status: DISCONTINUED | OUTPATIENT
Start: 2024-06-25 | End: 2024-06-25 | Stop reason: HOSPADM

## 2024-06-24 RX ORDER — BUMETANIDE 1 MG/1
1 TABLET ORAL DAILY
Status: DISCONTINUED | OUTPATIENT
Start: 2024-06-24 | End: 2024-06-25 | Stop reason: HOSPADM

## 2024-06-24 RX ORDER — VANCOMYCIN HYDROCHLORIDE 1 G/20ML
INJECTION, POWDER, LYOPHILIZED, FOR SOLUTION INTRAVENOUS PRN
Status: DISCONTINUED | OUTPATIENT
Start: 2024-06-24 | End: 2024-06-24 | Stop reason: SDUPTHER

## 2024-06-24 RX ORDER — HYDROCODONE BITARTRATE AND ACETAMINOPHEN 5; 325 MG/1; MG/1
1 TABLET ORAL EVERY 6 HOURS PRN
Status: DISCONTINUED | OUTPATIENT
Start: 2024-06-24 | End: 2024-06-25 | Stop reason: HOSPADM

## 2024-06-24 RX ORDER — PROPOFOL 10 MG/ML
INJECTION, EMULSION INTRAVENOUS CONTINUOUS PRN
Status: DISCONTINUED | OUTPATIENT
Start: 2024-06-24 | End: 2024-06-24 | Stop reason: SDUPTHER

## 2024-06-24 RX ORDER — FAMOTIDINE 20 MG/1
40 TABLET, FILM COATED ORAL DAILY
Status: DISCONTINUED | OUTPATIENT
Start: 2024-06-25 | End: 2024-06-25 | Stop reason: HOSPADM

## 2024-06-24 RX ORDER — FERROUS SULFATE 325(65) MG
325 TABLET ORAL
Status: DISCONTINUED | OUTPATIENT
Start: 2024-06-25 | End: 2024-06-25 | Stop reason: HOSPADM

## 2024-06-24 RX ORDER — GLYCOPYRROLATE 0.2 MG/ML
INJECTION INTRAMUSCULAR; INTRAVENOUS PRN
Status: DISCONTINUED | OUTPATIENT
Start: 2024-06-24 | End: 2024-06-24 | Stop reason: SDUPTHER

## 2024-06-24 RX ORDER — TROSPIUM CHLORIDE 20 MG/1
20 TABLET, FILM COATED ORAL
Status: DISCONTINUED | OUTPATIENT
Start: 2024-06-25 | End: 2024-06-24 | Stop reason: SDUPTHER

## 2024-06-24 RX ORDER — TROSPIUM CHLORIDE 20 MG/1
20 TABLET, FILM COATED ORAL
Status: DISCONTINUED | OUTPATIENT
Start: 2024-06-25 | End: 2024-06-25 | Stop reason: HOSPADM

## 2024-06-24 RX ADMIN — GLYCOPYRROLATE 0.2 MG: 0.2 INJECTION INTRAMUSCULAR; INTRAVENOUS at 14:44

## 2024-06-24 RX ADMIN — PROPOFOL 25 MG: 10 INJECTION, EMULSION INTRAVENOUS at 15:09

## 2024-06-24 RX ADMIN — HYDROCODONE BITARTRATE AND ACETAMINOPHEN 1 TABLET: 5; 325 TABLET ORAL at 23:37

## 2024-06-24 RX ADMIN — MICONAZOLE NITRATE: 20.6 POWDER TOPICAL at 23:39

## 2024-06-24 RX ADMIN — GABAPENTIN 100 MG: 100 CAPSULE ORAL at 23:37

## 2024-06-24 RX ADMIN — SODIUM CHLORIDE, PRESERVATIVE FREE 10 ML: 5 INJECTION INTRAVENOUS at 23:38

## 2024-06-24 RX ADMIN — LACTULOSE 20 G: 20 SOLUTION ORAL at 23:39

## 2024-06-24 RX ADMIN — SODIUM CHLORIDE: 9 INJECTION, SOLUTION INTRAVENOUS at 14:18

## 2024-06-24 RX ADMIN — MIDAZOLAM 1 MG: 1 INJECTION INTRAMUSCULAR; INTRAVENOUS at 14:39

## 2024-06-24 RX ADMIN — MIDAZOLAM 1 MG: 1 INJECTION INTRAMUSCULAR; INTRAVENOUS at 14:43

## 2024-06-24 RX ADMIN — PROPOFOL 45 MCG/KG/MIN: 10 INJECTION, EMULSION INTRAVENOUS at 14:40

## 2024-06-24 RX ADMIN — FENTANYL CITRATE 50 MCG: 50 INJECTION, SOLUTION INTRAMUSCULAR; INTRAVENOUS at 15:09

## 2024-06-24 RX ADMIN — ROPINIROLE HYDROCHLORIDE 2 MG: 2 TABLET, FILM COATED ORAL at 23:39

## 2024-06-24 RX ADMIN — VANCOMYCIN HYDROCHLORIDE 1000 MG: 1 INJECTION, POWDER, LYOPHILIZED, FOR SOLUTION INTRAVENOUS at 14:42

## 2024-06-24 RX ADMIN — PROPOFOL 30 MG: 10 INJECTION, EMULSION INTRAVENOUS at 14:39

## 2024-06-24 RX ADMIN — EPHEDRINE SULFATE 10 MG: 5 INJECTION INTRAVENOUS at 15:00

## 2024-06-24 ASSESSMENT — PAIN SCALES - GENERAL: PAINLEVEL_OUTOF10: 10

## 2024-06-24 ASSESSMENT — PAIN DESCRIPTION - ORIENTATION: ORIENTATION: LEFT

## 2024-06-24 ASSESSMENT — PAIN DESCRIPTION - LOCATION: LOCATION: SHOULDER

## 2024-06-24 ASSESSMENT — PAIN DESCRIPTION - DESCRIPTORS: DESCRIPTORS: ACHING;DISCOMFORT

## 2024-06-24 ASSESSMENT — LIFESTYLE VARIABLES: SMOKING_STATUS: 0

## 2024-06-24 NOTE — ANESTHESIA PRE PROCEDURE
Department of Anesthesiology  Preprocedure Note       Name:  Piper Clark   Age:  73 y.o.  :  1950                                          MRN:  [id number]         Date:  2024      Surgeon: Surgeon(s):  Dorys Waller MD    Procedure: Procedure(s):  Insert PPM single atrial    Medications prior to admission:   Prior to Admission medications    Medication Sig Start Date End Date Taking? Authorizing Provider   Lactobacillus (ACIDOPHILUS PO) Take by mouth    Emily Cornelius MD   clotrimazole-betamethasone (LOTRISONE) 1-0.05 % cream Apply topically 2 times daily Apply topically 2 times daily.    Emily Cornelius MD   estradiol (ESTRACE) 0.1 MG/GM vaginal cream Place 2 g vaginally daily    Emily Cornelius MD   HYDROcodone-acetaminophen (NORCO) 5-325 MG per tablet Take 1 tablet by mouth every 6 hours as needed for Pain. Max Daily Amount: 4 tablets    Emily Cornelius MD   gabapentin (NEURONTIN) 100 MG capsule Take 1 capsule by mouth in the morning and at bedtime.    Emily Cornelius MD   omeprazole (PRILOSEC) 20 MG delayed release capsule Take 1 capsule by mouth daily    Emily Cornelius MD   silver sulfADIAZINE (SILVADENE) 1 % cream Apply topically daily Apply topically daily.    Emily Cornelius MD   Fesoterodine Fumarate ER (TOVIAZ) 8 MG TB24 Take by mouth    Emily Cornelius MD   acetaminophen (TYLENOL) 325 MG tablet Take 2 tablets by mouth every 6 hours as needed for Pain    Emily Cornelius MD   insulin lispro protamine & lispro (HUMALOG MIX) (75-25) 100 UNIT per ML SUSP injection vial Inject into the skin once    Emily Cornelius MD   solifenacin (VESICARE) 10 MG tablet Take 0.5 tablets by mouth daily    Emily Cornelius MD   lactulose (CHRONULAC) 10 GM/15ML solution Take 30 mLs by mouth in the morning and at bedtime    Emily Cornelius MD   carboxymethylcellulose 1 % ophthalmic solution 1 drop 2 times daily    Emily Cornelius MD

## 2024-06-24 NOTE — PROGRESS NOTES
Unable to obtain BP on monitor.  Several failed attempts on bilateral arms.  Manual Pressure obtained

## 2024-06-24 NOTE — DISCHARGE INSTRUCTIONS
[facility] Pacemaker Instructions    Medications:  Resume Eliquis on 6/27/24.    Incision Care:  Leave brown Aquacel dressing on for 1 week.  Remove aquacel dressing on Monday 7/1/24.  Do not remove the steri strips from your incision.  They will be removed at your follow up appointment.  Keep the incision dry. You may shower; but do not let the water run directly on the incision for 1 week.  Check the area daily. Notify the office at [phone] if you develop any redness, swelling, drainage, warmth, or fever greater than 100 degrees.    Activity:  You may continue regular activity; but limit strenuous or stretching movements of the arm closest to your pacemaker.   Wear the arm immobilizer at all times for 48 hours and then at night for 4 weeks.    Avoid pulling yourself up with that arm.   Do not raise that elbow higher than shoulder height and do not lift anything weighing more than 2 pounds for 4 weeks.   Do not do any activities such as golfing, vacuuming, or mowing the lawn for 4 weeks.    Prevent any hard blows to the pacemaker site.      Driving:  You may drive, if you feel up to it, in 14 days or after your 2 week follow up visit.  Start with local/short trips to familiar places. Avoid highway/ high-speed driving for the first few days after you resume driving.  DO NOT drive until you have stopped taking prescription pain medications.      Special Instructions:  Let your dentist, doctor, or medical specialist know that you have a pacemaker  so precautions can be taken to protect the device.    Your device is considered to be “MRI conditional”; meaning that under certain circumstances, MRI exams may be performed after 6 weeks post implantation  Your pacemaker may set off a metal detector, such as those used in airports and courtrooms.  Let security know that you have a pacemaker & show them your card.    Remote Monitor:  You may receive your monitor prior to leaving the hospital, if not a

## 2024-06-24 NOTE — DISCHARGE SUMMARY
[facility]  Discharge Summary  Patient: Piper Clark  Medical Record Number: [id number]  Date of Procedure:  6/25/2024  Operating Electrophysiologist: Dorys Waller MD  Attending Electrophysiologist: Belinda Jovel MD  Referring Physician: Josesito Jain DO     Admission Date:6/24/2024      Discharge Date: 6/25/2024    Patient Active Problem List   Diagnosis    Essential hypertension    Osteoarthritis    Anxiety and depression    Anemia    Type 2 diabetes mellitus without complication (HCC)    Fall from ground level    Atrial fibrillation (HCC)    Intraparenchymal hemorrhage of brain (HCC)    Gait instability    S/P placement of cardiac pacemaker    AV node dysfunction    Atrial fibrillation with slow ventricular response (HCC)          Medication List        START taking these medications      doxycycline hyclate 100 MG tablet  Commonly known as: VIBRA-TABS  Take 1 tablet by mouth 2 times daily for 7 days            CONTINUE taking these medications      acetaminophen 325 MG tablet  Commonly known as: TYLENOL     ACIDOPHILUS PO     acidophilus/citrus pectin Tabs     apixaban 5 MG Tabs tablet  Commonly known as: ELIQUIS  Take 1 tablet by mouth 2 times daily     ascorbic acid 500 MG tablet  Commonly known as: VITAMIN C     aspirin 81 MG EC tablet     Benzocaine-Menthol 6-10 MG Lozg lozenge  Commonly known as: CEPACOL     Biofreeze 4 % Gel  Generic drug: Menthol (Topical Analgesic)     bumetanide 1 MG tablet  Commonly known as: BUMEX     calcium carbonate 500 MG chewable tablet  Commonly known as: TUMS     carboxymethylcellulose 1 % ophthalmic solution     clotrimazole-betamethasone 1-0.05 % cream  Commonly known as: LOTRISONE     estradiol 0.1 MG/GM vaginal cream  Commonly known as: ESTRACE     famotidine 40 MG tablet  Commonly known as: PEPCID     ferrous sulfate 325 (65 Fe) MG tablet  Commonly known as: IRON 325     fish oil 1000 MG

## 2024-06-24 NOTE — H&P
This is a 73 y.o. female with a history of   Patient Active Problem List   Diagnosis    Essential hypertension    Osteoarthritis    Anxiety and depression    Anemia    Type 2 diabetes mellitus without complication (HCC)    Fall from ground level    Atrial fibrillation (HCC)    Intraparenchymal hemorrhage of brain (HCC)    Gait instability    S/P placement of cardiac pacemaker    who presents with atrial fibrillation.    1. Permanent atrial fibrillation with slow ventricular rate  - Diagnosed 2/21/23 on EKG.  - Symptomatic.  - ITV0MD1-EGIu of 4-   - OAC: Eliquis 5mg BID.  - TTE 2/23/23 showed normal LV EF, moderate LAE and mild JADEN.  - TTE 6/13/24: LV EF 55-60%.  - Risks, benefits, and alternatives of permanent pacemaker implantation were discussed in detail today. These risks include but are not limited to bleeding, infection, blood clot, pneumothorax, hemothorax,cardiac valve damage, cardiac perforation and tamponade required emergent thoracotomy, contrast induced nephropathy leading to short or even long term dialysis, vascular injury requiring emergent surgical repair, lead dislodgement, stroke and even death. The patient understands these risks and agrees to proceed with pacemaker implantation.    2. Hypertension  - On Zestril.    3. Diabetes mellitus    4.  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    5. Anxiety and depression  - On Pamelor, Risperdal and Restoril.     6. Mechanical fall    7. Anemia  - On Ferrous sulfate.    8. Probable BRE    9.  Debility resides in facility and is wheelchair-bound.    Recommendations:  Proceed with single chamber VVIR pacemaker implantation.   Follow up after the procedure. Encouraged the patient to call the office for any questions or concerns.    Thank you for allowing me to participate in your patient's care.  Please call me if there are any questions or concerns.      Dorys

## 2024-06-25 ENCOUNTER — APPOINTMENT (OUTPATIENT)
Dept: GENERAL RADIOLOGY | Age: 74
End: 2024-06-25
Attending: INTERNAL MEDICINE
Payer: COMMERCIAL

## 2024-06-25 VITALS
WEIGHT: 245.5 LBS | RESPIRATION RATE: 13 BRPM | TEMPERATURE: 97.6 F | HEART RATE: 60 BPM | BODY MASS INDEX: 45.18 KG/M2 | HEIGHT: 62 IN | SYSTOLIC BLOOD PRESSURE: 116 MMHG | DIASTOLIC BLOOD PRESSURE: 53 MMHG | OXYGEN SATURATION: 100 %

## 2024-06-25 LAB
ANION GAP SERPL CALCULATED.3IONS-SCNC: 10 MMOL/L (ref 7–16)
BUN SERPL-MCNC: 26 MG/DL (ref 6–23)
CALCIUM SERPL-MCNC: 9.2 MG/DL (ref 8.6–10.2)
CHLORIDE SERPL-SCNC: 106 MMOL/L (ref 98–107)
CO2 SERPL-SCNC: 25 MMOL/L (ref 22–29)
CREAT SERPL-MCNC: 0.9 MG/DL (ref 0.5–1)
EKG ATRIAL RATE: 63 BPM
EKG Q-T INTERVAL: 538 MS
EKG QRS DURATION: 182 MS
EKG QTC CALCULATION (BAZETT): 538 MS
EKG R AXIS: -76 DEGREES
EKG T AXIS: 91 DEGREES
EKG VENTRICULAR RATE: 60 BPM
ERYTHROCYTE [DISTWIDTH] IN BLOOD BY AUTOMATED COUNT: 14.7 % (ref 11.5–15)
GFR, ESTIMATED: 64 ML/MIN/1.73M2
GLUCOSE SERPL-MCNC: 107 MG/DL (ref 74–99)
HCT VFR BLD AUTO: 36.2 % (ref 34–48)
HGB BLD-MCNC: 11.4 G/DL (ref 11.5–15.5)
MAGNESIUM SERPL-MCNC: 2.2 MG/DL (ref 1.6–2.6)
MCH RBC QN AUTO: 28.8 PG (ref 26–35)
MCHC RBC AUTO-ENTMCNC: 31.5 G/DL (ref 32–34.5)
MCV RBC AUTO: 91.4 FL (ref 80–99.9)
PLATELET # BLD AUTO: 124 K/UL (ref 130–450)
PMV BLD AUTO: 9.3 FL (ref 7–12)
POTASSIUM SERPL-SCNC: 4 MMOL/L (ref 3.5–5)
RBC # BLD AUTO: 3.96 M/UL (ref 3.5–5.5)
SODIUM SERPL-SCNC: 141 MMOL/L (ref 132–146)
WBC OTHER # BLD: 5.5 K/UL (ref 4.5–11.5)

## 2024-06-25 PROCEDURE — 36415 COLL VENOUS BLD VENIPUNCTURE: CPT

## 2024-06-25 PROCEDURE — G0378 HOSPITAL OBSERVATION PER HR: HCPCS

## 2024-06-25 PROCEDURE — 83735 ASSAY OF MAGNESIUM: CPT

## 2024-06-25 PROCEDURE — 2580000003 HC RX 258: Performed by: INTERNAL MEDICINE

## 2024-06-25 PROCEDURE — 71045 X-RAY EXAM CHEST 1 VIEW: CPT

## 2024-06-25 PROCEDURE — 80048 BASIC METABOLIC PNL TOTAL CA: CPT

## 2024-06-25 PROCEDURE — 6370000000 HC RX 637 (ALT 250 FOR IP): Performed by: INTERNAL MEDICINE

## 2024-06-25 PROCEDURE — 6360000002 HC RX W HCPCS: Performed by: INTERNAL MEDICINE

## 2024-06-25 PROCEDURE — 2500000003 HC RX 250 WO HCPCS: Performed by: INTERNAL MEDICINE

## 2024-06-25 PROCEDURE — 85027 COMPLETE CBC AUTOMATED: CPT

## 2024-06-25 RX ORDER — ESTRADIOL 0.1 MG/G
2 CREAM VAGINAL DAILY
Status: DISCONTINUED | OUTPATIENT
Start: 2024-06-26 | End: 2024-06-25 | Stop reason: HOSPADM

## 2024-06-25 RX ADMIN — POTASSIUM CHLORIDE 10 MEQ: 750 TABLET, EXTENDED RELEASE ORAL at 09:45

## 2024-06-25 RX ADMIN — CLOTRIMAZOLE AND BETAMETHASONE DIPROPIONATE: 10; .5 CREAM TOPICAL at 09:45

## 2024-06-25 RX ADMIN — GABAPENTIN 100 MG: 100 CAPSULE ORAL at 09:45

## 2024-06-25 RX ADMIN — MICONAZOLE NITRATE: 20.6 POWDER TOPICAL at 09:46

## 2024-06-25 RX ADMIN — FERROUS SULFATE TAB 325 MG (65 MG ELEMENTAL FE) 325 MG: 325 (65 FE) TAB at 09:45

## 2024-06-25 RX ADMIN — FAMOTIDINE 40 MG: 20 TABLET, FILM COATED ORAL at 09:45

## 2024-06-25 RX ADMIN — LACTULOSE 20 G: 20 SOLUTION ORAL at 09:45

## 2024-06-25 RX ADMIN — SODIUM CHLORIDE, PRESERVATIVE FREE 10 ML: 5 INJECTION INTRAVENOUS at 09:47

## 2024-06-25 RX ADMIN — ACETAMINOPHEN 650 MG: 325 TABLET ORAL at 10:16

## 2024-06-25 RX ADMIN — PANTOPRAZOLE SODIUM 40 MG: 40 TABLET, DELAYED RELEASE ORAL at 05:13

## 2024-06-25 RX ADMIN — TROSPIUM CHLORIDE 20 MG: 20 TABLET, FILM COATED ORAL at 05:13

## 2024-06-25 RX ADMIN — VANCOMYCIN HYDROCHLORIDE 1000 MG: 1 INJECTION, POWDER, LYOPHILIZED, FOR SOLUTION INTRAVENOUS at 03:28

## 2024-06-25 RX ADMIN — SODIUM CHLORIDE, PRESERVATIVE FREE 10 ML: 5 INJECTION INTRAVENOUS at 09:46

## 2024-06-25 RX ADMIN — BUMETANIDE 1 MG: 1 TABLET ORAL at 09:45

## 2024-06-25 RX ADMIN — SILVER SULFADIAZINE: 10 CREAM TOPICAL at 09:46

## 2024-06-25 RX ADMIN — ASPIRIN 81 MG: 81 TABLET, COATED ORAL at 09:44

## 2024-06-25 ASSESSMENT — PAIN SCALES - GENERAL
PAINLEVEL_OUTOF10: 0
PAINLEVEL_OUTOF10: 0

## 2024-06-25 NOTE — PLAN OF CARE
Problem: Chronic Conditions and Co-morbidities  Goal: Patient's chronic conditions and co-morbidity symptoms are monitored and maintained or improved  6/25/2024 1113 by Cherry Christiansen RN  Outcome: Progressing  6/25/2024 0013 by Bre Wright RN  Outcome: Progressing     Problem: Discharge Planning  Goal: Discharge to home or other facility with appropriate resources  6/25/2024 1113 by Cherry Christiansen RN  Outcome: Progressing  6/25/2024 0013 by Bre Wright RN  Outcome: Progressing     Problem: Skin/Tissue Integrity  Goal: Absence of new skin breakdown  Description: 1.  Monitor for areas of redness and/or skin breakdown  2.  Assess vascular access sites hourly  3.  Every 4-6 hours minimum:  Change oxygen saturation probe site  4.  Every 4-6 hours:  If on nasal continuous positive airway pressure, respiratory therapy assess nares and determine need for appliance change or resting period.  6/25/2024 1113 by Cherry Christiansen RN  Outcome: Progressing  6/25/2024 0013 by Bre Wright RN  Outcome: Progressing     Problem: Safety - Adult  Goal: Free from fall injury  6/25/2024 1113 by Cherry Christiansen RN  Outcome: Progressing  6/25/2024 0013 by Bre Wright RN  Outcome: Progressing     Problem: ABCDS Injury Assessment  Goal: Absence of physical injury  6/25/2024 1113 by Cherry Christiansen RN  Outcome: Progressing  6/25/2024 0013 by Bre Wright RN  Outcome: Progressing     Problem: Pain  Goal: Verbalizes/displays adequate comfort level or baseline comfort level  6/25/2024 1113 by Cherry Christiansen RN  Outcome: Progressing  6/25/2024 0013 by Bre Wright RN  Outcome: Progressing     
  Problem: Chronic Conditions and Co-morbidities  Goal: Patient's chronic conditions and co-morbidity symptoms are monitored and maintained or improved  6/25/2024 1607 by Yennifer Christiansen RN  Outcome: Completed  6/25/2024 1115 by Cherry Christiansen RN  Outcome: Progressing  6/25/2024 1113 by Cherry Christiansen RN  Outcome: Progressing     Problem: Discharge Planning  Goal: Discharge to home or other facility with appropriate resources  6/25/2024 1607 by Yennifer Christiansen RN  Outcome: Completed  6/25/2024 1115 by Cherry Christiansen RN  Outcome: Progressing  6/25/2024 1113 by Cherry Christiansen RN  Outcome: Progressing     Problem: Skin/Tissue Integrity  Goal: Absence of new skin breakdown  Description: 1.  Monitor for areas of redness and/or skin breakdown  2.  Assess vascular access sites hourly  3.  Every 4-6 hours minimum:  Change oxygen saturation probe site  4.  Every 4-6 hours:  If on nasal continuous positive airway pressure, respiratory therapy assess nares and determine need for appliance change or resting period.  6/25/2024 1607 by Yennifer Christiansen RN  Outcome: Completed  6/25/2024 1115 by Cherry Christiansen RN  Outcome: Progressing  6/25/2024 1113 by Cherry Christiansen RN  Outcome: Progressing     Problem: Safety - Adult  Goal: Free from fall injury  6/25/2024 1607 by Yennifer Christiansen RN  Outcome: Completed  6/25/2024 1115 by Cherry Christiansen RN  Outcome: Progressing  6/25/2024 1113 by Cherry Christiansen RN  Outcome: Progressing     Problem: ABCDS Injury Assessment  Goal: Absence of physical injury  6/25/2024 1607 by Yennifer Christiansen RN  Outcome: Completed  6/25/2024 1115 by Cherry Christiansen RN  Outcome: Progressing  6/25/2024 1113 by Cherry Christiansen RN  Outcome: Progressing     Problem: Pain  Goal: Verbalizes/displays adequate comfort level or baseline comfort level  6/25/2024 1607 by Yennifer Christinasen RN  Outcome: Completed  6/25/2024 1115 by Cherry Christiansen RN  Outcome: Progressing  6/25/2024 1113 by Cherry Christiansen 
  Problem: Chronic Conditions and Co-morbidities  Goal: Patient's chronic conditions and co-morbidity symptoms are monitored and maintained or improved  Outcome: Progressing     Problem: Discharge Planning  Goal: Discharge to home or other facility with appropriate resources  Outcome: Progressing     Problem: Skin/Tissue Integrity  Goal: Absence of new skin breakdown  Description: 1.  Monitor for areas of redness and/or skin breakdown  2.  Assess vascular access sites hourly  3.  Every 4-6 hours minimum:  Change oxygen saturation probe site  4.  Every 4-6 hours:  If on nasal continuous positive airway pressure, respiratory therapy assess nares and determine need for appliance change or resting period.  Outcome: Progressing     Problem: Safety - Adult  Goal: Free from fall injury  Outcome: Progressing     Problem: ABCDS Injury Assessment  Goal: Absence of physical injury  Outcome: Progressing     Problem: Pain  Goal: Verbalizes/displays adequate comfort level or baseline comfort level  Outcome: Progressing     
0013 by Bre Wright, RN  Outcome: Progressing     
No

## 2024-06-25 NOTE — PROGRESS NOTES
Dr. Sam is not ready for discharge. Wants to wait to see if \"not bloody and can have full prescription\"

## 2024-06-25 NOTE — DISCHARGE INSTR - COC
Continuity of Care Form    Patient Name: Piper Clark   :  1950  MRN:  [id number]    Admit date:  2024  Discharge date:  24    Code Status Order: Full Code   Advance Directives:     Admitting Physician:  Dorys Waller MD  PCP: Josesito Jain DO    Discharging Nurse: Yennifer  Discharging Hospital Unit/Room#: 6424/6424-A  Discharging Unit Phone Number: [phone]    Emergency Contact:   Extended Emergency Contact Information  Primary Emergency Contact: Lazaro Clark   [facility]  Home Phone: [phone]  Relation: Child  Secondary Emergency Contact: Sravan Connell  Relation: Brother/Sister    Past Surgical History:  Past Surgical History:   Procedure Laterality Date    CHOLECYSTECTOMY      EP DEVICE PROCEDURE N/A 2024    Insert PPM single atrial performed by Dorys Waller MD at [facility] CARDIAC CATH LAB    GASTRIC BYPASS SURGERY  2000    HERNIA REPAIR      JOINT REPLACEMENT Bilateral 10/2012       Immunization History:   Immunization History   Administered Date(s) Administered    COVID-19, MODERNA BLUE border, Primary or Immunocompromised, (age 12y+), IM, 100 mcg/0.5mL 2021, 2021, 2021    Influenza Vaccine, unspecified formulation 2016    Influenza, High Dose (Fluzone 65 yrs and older) 2017, 2018    Pneumococcal, PCV-13, PREVNAR 13, (age 6w+), IM, 0.5mL 2018    Pneumococcal, PPSV23, PNEUMOVAX 23, (age 2y+), SC/IM, 0.5mL 2016    TDaP, ADACEL (age 10y-64y), BOOSTRIX (age 10y+), IM, 0.5mL 10/25/2018       Active Problems:  Patient Active Problem List   Diagnosis Code    Essential hypertension I10    Osteoarthritis M19.90    Anxiety and depression F41.9, F32.A    Anemia D64.9    Type 2 diabetes mellitus without complication (HCC) E11.9    Fall from ground level W18.30XA    Atrial fibrillation (HCC) I48.91    Intraparenchymal hemorrhage of brain (HCC) I61.9    Gait instability R26.81    S/P placement of cardiac pacemaker

## 2024-06-25 NOTE — PROGRESS NOTES
Med req completed using the med req from the facility. Messaged Dr Waller stating that this RN updated the med req.

## 2024-06-25 NOTE — CARE COORDINATION
APPLICABLE: The Patient and/or patient representative Piper and her family were provided with a choice of provider and agrees with the discharge plan. Freedom of choice list with basic dialogue that supports the patient's individualized plan of care/goals and shares the quality data associated with the providers was provided to:     Patient Representative Name:       The Patient and/or Patient Representative Agree with the Discharge Plan?      Leigh Ann Mann RN  Case Management Department  Ph: [phone]

## 2024-06-25 NOTE — ACP (ADVANCE CARE PLANNING)
Advance Care Planning   Healthcare Decision Maker:    Primary Decision Maker: Lazaro Clark - Lenny - [phone]    Click here to complete Healthcare Decision Makers including selection of the Healthcare Decision Maker Relationship (ie \"Primary\").  Today we documented Decision Maker(s) consistent with Legal Next of Kin hierarchy.       Electronically signed by Leigh Ann Mann RN on 6/25/2024 at 12:49 PM

## 2024-06-25 NOTE — PROGRESS NOTES
CLINICAL PHARMACY NOTE: MEDS TO BEDS    Total # of Prescriptions Filled: 1   The following medications were delivered to the patient:  Doxycycline 100 mg    Additional Documentation:     Delivered meds to patient at bedside

## 2024-06-25 NOTE — PROGRESS NOTES
4 Eyes Skin Assessment     NAME:  Piper Clark  YOB: 1950  MEDICAL RECORD NUMBER:  [id number]    The patient is being assessed for  Admission    I agree that at least one RN has performed a thorough Head to Toe Skin Assessment on the patient. ALL assessment sites listed below have been assessed.      Areas assessed by both nurses:    Head, Face, Ears, Shoulders, Back, Chest, Arms, Elbows, Hands, Sacrum. Buttock, Coccyx, Ischium, Legs. Feet and Heels, and Under Medical Devices         Does the Patient have a Wound? No noted wound(s)       Jerzy Prevention initiated by RN: Yes  Wound Care Orders initiated by RN: No    Pressure Injury (Stage 3,4, Unstageable, DTI, NWPT, and Complex wounds) if present, place Wound referral order by RN under : No    New Ostomies, if present place, Ostomy referral order under : No     Nurse 1 eSignature: Electronically signed by Bre Wright RN on 6/24/24 at 11:56 PM EDT    **SHARE this note so that the co-signing nurse can place an eSignature**    Nurse 2 eSignature: Electronically signed by Kalie Morfin RN on 6/24/24 at 11:58 PM EDT

## 2024-06-25 NOTE — ANESTHESIA POSTPROCEDURE EVALUATION
Department of Anesthesiology  Postprocedure Note    Patient: Piper Clark  MRN: [id number]  YOB: 1950  Date of evaluation: 6/25/2024    Procedure Summary       Date: 06/24/24 [place] [facility] [place] [facility] [place]    Anesthesia Start: 1418 Anesthesia Stop: 1540    Procedure: Insert PPM single atrial Diagnosis:       Severe sinus bradycardia      (Severe sinus bradycardia [R00.1])    Providers: Dorys Waller MD Responsible Provider: Meredith To MD    Anesthesia Type: MAC ASA Status: 4            Anesthesia Type: No value filed.    Mihaela Phase I: Mihaela Score: 10    Mihaela Phase II:      Anesthesia Post Evaluation    Patient location during evaluation: PACU  Patient participation: complete - patient participated  Level of consciousness: awake and alert  Airway patency: patent  Nausea & Vomiting: no nausea and no vomiting  Cardiovascular status: blood pressure returned to baseline and hemodynamically stable  Respiratory status: acceptable and spontaneous ventilation  Hydration status: euvolemic  Multimodal analgesia pain management approach  Pain management: adequate    There were no known notable events for this encounter.

## 2024-06-25 NOTE — PROGRESS NOTES
Piper Clark was ordered Estradiol vaginal cream which is a nonformulary medication.  This medication will need to be supplied by the patient as the pharmacy does not carry this non-formulary medication.    If the medication has not been administered by 1400 on the following day from the time the order was placed, a pharmacist will follow-up with the nurse of the patient to assess the capability of the patient to bring in the medication.    If it is determined that the patient cannot supply the medication and it is not available to be dispensed from the pharmacy, the provider will be notified.

## 2024-06-26 PROBLEM — R00.1 SEVERE SINUS BRADYCARDIA: Status: ACTIVE | Noted: 2024-06-26

## 2024-07-09 ENCOUNTER — NURSE ONLY (OUTPATIENT)
Dept: NON INVASIVE DIAGNOSTICS | Age: 74
End: 2024-07-09

## 2024-07-09 NOTE — PROGRESS NOTES
See Murj report. See picture of ecchymosis in media.     Briana Frnaces RN, BSN  [facility] Heart and Vascular [place]   Device Clinic

## 2024-07-09 NOTE — PATIENT INSTRUCTIONS
Continue arm restrictions until 8/6/2024  You may shower starting now    Call if any signs or symptoms of infection [phone] ext: [phone]  Fevers, chills, redness, swelling or drainage.       Hook up home  Monitor  StudyRoom Stay connected: [phone]

## 2024-11-05 ENCOUNTER — HOSPITAL ENCOUNTER (OUTPATIENT)
Dept: MRI IMAGING | Age: 74
Discharge: HOME | End: 2024-11-07

## 2024-11-05 DIAGNOSIS — Q28.3 CAVERNOUS MALFORMATION: ICD-10-CM

## 2024-12-16 ENCOUNTER — TELEPHONE (OUTPATIENT)
Dept: NON INVASIVE DIAGNOSTICS | Age: 74
End: 2024-12-16

## 2024-12-16 NOTE — TELEPHONE ENCOUNTER
I returned a phone call to [facility] at [facility]. I spoke with ROBERT Madrid. She stated she noted Piper's left upper pacemaker incision to be reddened, swollen with a blister formation on left edge of incision this morning. Mercy stated the patient is c/o some discomfort over the site. Mercy denies the patient has any fever or drainage. I offered two appointment times tomorrow: 0900 or 1300. Mercy will schedule the patient for 1300 on 12/17/24.    Briana Frances RN, BSN  [facility] Heart and Vascular [place]   Device Clinic

## 2024-12-17 ENCOUNTER — NURSE ONLY (OUTPATIENT)
Dept: NON INVASIVE DIAGNOSTICS | Age: 74
End: 2024-12-17

## 2024-12-17 DIAGNOSIS — L08.9 SKIN INFECTION: ICD-10-CM

## 2024-12-17 DIAGNOSIS — I45.89 AV NODE DYSFUNCTION: ICD-10-CM

## 2024-12-17 DIAGNOSIS — Z95.0 S/P PLACEMENT OF CARDIAC PACEMAKER: Primary | ICD-10-CM

## 2024-12-17 RX ORDER — CYCLOBENZAPRINE HCL 10 MG
10 TABLET ORAL 3 TIMES DAILY PRN
COMMUNITY

## 2024-12-17 RX ORDER — CEPHALEXIN 500 MG/1
500 CAPSULE ORAL 4 TIMES DAILY
Qty: 28 CAPSULE | Refills: 0 | Status: SHIPPED | OUTPATIENT
Start: 2024-12-17 | End: 2024-12-31

## 2024-12-17 RX ORDER — GLIMEPIRIDE 2 MG/1
2 TABLET ORAL
COMMUNITY

## 2024-12-17 NOTE — PROGRESS NOTES
See Murj report.   I ordered a monitor from [employer] to be shipped to the nursing home.    Briana Frances RN, BSN  [facility] Heart and Vascular [place]   Device Clinic

## 2024-12-17 NOTE — PATIENT INSTRUCTIONS
Contact the Device Clinic at [phone], ext [phone] with any questions. Will order a new [employer] bedside monitor. When it arives, follow instructions to connect    Get lab work: Blood cultures x2 and CBC  Start Cephalexin 500 mg four times a day for 14 days    Return in two weeks for recheck of incision

## 2024-12-30 ENCOUNTER — NURSE ONLY (OUTPATIENT)
Dept: NON INVASIVE DIAGNOSTICS | Age: 74
End: 2024-12-30

## 2024-12-30 NOTE — PROGRESS NOTES
Here for left upper chest incision check. Area to left lateral edge of incision healing, eschar noted. Area no longer raised and red. See picture in media. MARSHAL Trevino shown picture. Blood culture result from nursing home scanned to media. Culture is negative. Patient has one more day of Cephalexin. Instructed nursing home staff to contact the office if area opens, if patient develops fever or chills.     Patient needs to keep her January 17, 2025 appt.     Briana Frances RN, BSN  [facility] Heart and Vascular [place]   Device Clinic

## 2024-12-30 NOTE — PATIENT INSTRUCTIONS
Contact the Device Clinic at [phone], ext [phone] with any device questions    Call the Device Clinic if Piper develops fever, chills, drainage, or if left upper chest incision opens up. She has an appointment on 1/17/25 @ 1000

## 2025-01-17 ENCOUNTER — OFFICE VISIT (OUTPATIENT)
Dept: NON INVASIVE DIAGNOSTICS | Age: 75
End: 2025-01-17

## 2025-01-17 VITALS
HEIGHT: 62 IN | BODY MASS INDEX: 44.35 KG/M2 | HEART RATE: 61 BPM | DIASTOLIC BLOOD PRESSURE: 62 MMHG | OXYGEN SATURATION: 94 % | WEIGHT: 241 LBS | TEMPERATURE: 97.1 F | RESPIRATION RATE: 18 BRPM | SYSTOLIC BLOOD PRESSURE: 118 MMHG

## 2025-01-17 DIAGNOSIS — Z79.01 ANTICOAGULATED BY ANTICOAGULATION TREATMENT: ICD-10-CM

## 2025-01-17 DIAGNOSIS — T81.31XD SUPERFICIAL DISRUPTION OR DEHISCENCE OF OPERATION WOUND, SUBSEQUENT ENCOUNTER: ICD-10-CM

## 2025-01-17 DIAGNOSIS — Z95.0 S/P PLACEMENT OF CARDIAC PACEMAKER: ICD-10-CM

## 2025-01-17 DIAGNOSIS — I10 ESSENTIAL HYPERTENSION: ICD-10-CM

## 2025-01-17 DIAGNOSIS — R53.81 DEBILITY: ICD-10-CM

## 2025-01-17 DIAGNOSIS — I48.21 PERMANENT ATRIAL FIBRILLATION (HCC): Primary | ICD-10-CM

## 2025-01-17 NOTE — PROGRESS NOTES
mellitus    Recommendations:    1.  Device clinic for wound check in 3 to 4 weeks to ensure that small scab is healed  2.  Remote monitoring every 91 days  3.  Office visit in 6 months or sooner if needed, possible stretch to 1 year after stable device check and site.      Re-education on importance of well controlled HTN (goal BP < 130/80), adequate weight control (goal BMI of < 27), physical activity consisting of moderate cardiopulmonary exercise up to a goal of 250 min/wk, smoking/tobacco abstinence and limited ETOH intake.       I have spent a total of 41 minutes with the patient and the family reviewing the above stated recommendations. And a total of >50% of that time involved face-to-face time providing counseling and or coordination of care with the other providers.      Thank you for allowing me to participate in your patient's care.  Please call me if there are any questions or concerns.        Zayra Hobbs, APRN - CNP  Cardiac Electrophysiology  [facility]  The Heart and Vascular [place]: [facility]  12:46 PM  1/17/2025

## 2025-05-29 NOTE — LETTER
Cut down dose of Librium.  Folic acid and thiamine  IV hydration   [place] [employer] Medicaid  CERTIFICATION OF NECESSITY  FOR NON-EMERGENCY TRANSPORTATION   BY GROUND AMBULANCE      Individual Information   1. Name: Addy Pacheco 2. [place] Medicaid Billing Number:    3. Address: [address] [place] [address] [address] [address] [address] Open MRI and return                                                                     [address]     Transporation Provider Information   4. Provider Name:   5. [place] Medicaid Provider Number:  National Provider Identifier (NPI):      Certification  7. Criteria:  During transport, this individual requires:  [x] Medical treatment or continuous     supervision by an EMT. [] The administration or regulation of oxygen by another person. [] Supervised protective restraint. 8. Period Beginning Date: 2/22/2023     9. Length  [x] Not more than 1 day(s)  [] One Year     Additional Information Relevant to Certification   10. Comments or Explanations, If Necessary or Appropriate   Injury of head, initial encounter, Fall from Standing, Right knee pain, Anxiety and depression     Certifying Practitioner Information   11. Name of Practitioner: Dr. Cm Bell MD   12. [place] Medicaid Provider Number, If Applicable:  [address] Provider Identifier (NPI):      Signature Information   14. Date of Signature: 2/22/2023   15. Name of Person Signing:   Matthew Newton     16. Signature and Professional Designation:   Electronically signed by BINA Newton on 2/22/2023 at 11:49 AM       OD [id number]  Rev. 7/2015          [address] [facility] Encounter Date/Time: 2/21/2023 [facility] Account: [de-identified]    MRN: [id number]    Patient: Addy Pacheco    Contact Serial #: [id number]      ENCOUNTER          Patient Class: I Private Enc?   No Unit RM BD: [facility] 6501/6501-A   Hospital Service: MARCIA   Encounter DX: Injury of head, initial *   ADM Provider: Licha Dominguez MD   Procedure:   ATT Provider: Arin Perales MD   REF Provider:        Admission DX: Injury of head, initial encounter, Fall from standing, initial encounter, Fall from ground level, Right knee pain, unspecified chronicity and DX codes: S09.90XA, W19. Abdiaziz Gunfortino, C23.052      PATIENT                 Name: Ramírez Sorto : 1950 (67 yrs)   Address: [address] [place] [address] Sex: Female   [place] city: [address] [place] [address] Drive [address]         Marital Status:    Employer: RETIRED         [employer]: None   Primary Care Provider:           Primary Phone: [phone]   EMERGENCY CONTACT   Contact Name Legal Guardian? Relationship to Patient Home Phone Work Phone   1. Lazaro Clark  2. Michela Martínez      Child  Brother/Sister [phone]                 GUARANTOR            Guarantor: Ramírez Sorto     : 1950   Address: [address] [place] [address] Sex: Female     [place] [address]     Relation to Patient: Self       Home Phone: [phone]   Guarantor ID: [id number]       Work Phone:     Guarantor Employer: RETIRED         Status: RETIRED      COVERAGE        PRIMARY INSURANCE   Payor: MEDICARE Plan: MEDICARE PART A AND B   Payor Address: [address] [place] [address],  [facility], [facility]       Group Number:   Insurance Type: INDEMNITY   Subscriber Name: Lars Hughes : 1950   Subscriber ID: [id number] Pat. Rel. to Sub: Self   SECONDARY INSURANCE   Payor:   Plan:     Payor Address:  ,           Group Number:   Insurance Type:     Subscriber Name:   Subscriber :     Subscriber ID:   Pat.  Rel. to Sub:        CSN: [id number]

## 2025-06-12 NOTE — PROGRESS NOTES
[facility]  Outpatient Progress Note  Piper Clark  1950  Date of Service: 6/24/2025  PCP: Josesito Jain DO  Electrophysiologist: Dr. Waller             Subjective: Piper Clark is seen for follow-up and management of: pacemaker in situ- SC PPM    Last seen in the office with Zayra Hobbs on 1/17/25    PMH as noted below significant for hypertension, diabetes mellitus, obesity, wheelchair bound, anxiety and depression. In 6/2024, she presented to the hospital after  tripped on her slipper and fell down hit the head on the door. She went to [facility] and was found to have possible right parietal intracranial hemorrhage so she was transferred to [facility]. She was seen by Neurosurgery and CT head was thought to be calcified AVM and later conformed by MRI. Her presenting EKG showed AF with a CVR, she remained in AF during her hospitalization and was asymptomatic. She was discharged with a Zio monitor that showed AF with a controled rate max pause 3.5 sec. She resides in a nursing facility and was noted to have bradycardia and altered mental status in 08/2023, she was seen at [facility] by Dr. Andrade who noted ongoing AF with rates in the 50's an no significant pauses with ongoing cellulitis.   She underwent repeat monitoring in May 2024 which showed continued bradycardia and some pauses.  ON 6/24/24, she underwent single chamber pacemaker implantation.  Postprocedure, in July she had some ecchymotic areas which pictures are in media.  In December 2024, nursing home staff noted redness, slightly \"nipple like\" raised area at top left side of her incision which was healed at the time.  Pictures also in media.  She was brought in and prescribed antibiotics.  She returned to the office 2 weeks later and she states that the red area had drainage.  Blood cultures were done which were negative about 1 week prior to drainage.  There was

## 2025-06-24 ENCOUNTER — OFFICE VISIT (OUTPATIENT)
Dept: NON INVASIVE DIAGNOSTICS | Age: 75
End: 2025-06-24
Payer: MEDICARE

## 2025-06-24 VITALS
HEART RATE: 61 BPM | RESPIRATION RATE: 16 BRPM | DIASTOLIC BLOOD PRESSURE: 58 MMHG | SYSTOLIC BLOOD PRESSURE: 110 MMHG | TEMPERATURE: 97.5 F | BODY MASS INDEX: 45.08 KG/M2 | HEIGHT: 62 IN | WEIGHT: 245 LBS

## 2025-06-24 DIAGNOSIS — E66.01 MORBID OBESITY (HCC): ICD-10-CM

## 2025-06-24 DIAGNOSIS — I48.91 ATRIAL FIBRILLATION WITH SLOW VENTRICULAR RESPONSE (HCC): Primary | ICD-10-CM

## 2025-06-24 DIAGNOSIS — Z95.0 CARDIAC PACEMAKER IN SITU: ICD-10-CM

## 2025-06-24 PROBLEM — E66.9 OBESITY: Status: ACTIVE | Noted: 2025-06-24

## 2025-06-24 PROCEDURE — 3017F COLORECTAL CA SCREEN DOC REV: CPT | Performed by: NURSE PRACTITIONER

## 2025-06-24 PROCEDURE — 1160F RVW MEDS BY RX/DR IN RCRD: CPT | Performed by: NURSE PRACTITIONER

## 2025-06-24 PROCEDURE — 3078F DIAST BP <80 MM HG: CPT | Performed by: NURSE PRACTITIONER

## 2025-06-24 PROCEDURE — 1123F ACP DISCUSS/DSCN MKR DOCD: CPT | Performed by: NURSE PRACTITIONER

## 2025-06-24 PROCEDURE — 1159F MED LIST DOCD IN RCRD: CPT | Performed by: NURSE PRACTITIONER

## 2025-06-24 PROCEDURE — G8417 CALC BMI ABV UP PARAM F/U: HCPCS | Performed by: NURSE PRACTITIONER

## 2025-06-24 PROCEDURE — 99214 OFFICE O/P EST MOD 30 MIN: CPT | Performed by: NURSE PRACTITIONER

## 2025-06-24 PROCEDURE — 1090F PRES/ABSN URINE INCON ASSESS: CPT | Performed by: NURSE PRACTITIONER

## 2025-06-24 PROCEDURE — G8400 PT W/DXA NO RESULTS DOC: HCPCS | Performed by: NURSE PRACTITIONER

## 2025-06-24 PROCEDURE — 3074F SYST BP LT 130 MM HG: CPT | Performed by: NURSE PRACTITIONER

## 2025-06-24 PROCEDURE — 1036F TOBACCO NON-USER: CPT | Performed by: NURSE PRACTITIONER

## 2025-06-24 PROCEDURE — G8427 DOCREV CUR MEDS BY ELIG CLIN: HCPCS | Performed by: NURSE PRACTITIONER

## (undated) DEVICE — AGENT HEMOSTATIC SURGIFLOW MATRIX KIT W/THROMBIN

## (undated) DEVICE — PAD, DEFIB, ADULT, RADIOTRAN, PHYSIO, LO: Brand: MEDLINE

## (undated) DEVICE — MERITMEDICAL 7FR PRELUDE SNAP PEEL-AWAY